# Patient Record
Sex: MALE | Race: WHITE | Employment: OTHER | URBAN - METROPOLITAN AREA
[De-identification: names, ages, dates, MRNs, and addresses within clinical notes are randomized per-mention and may not be internally consistent; named-entity substitution may affect disease eponyms.]

---

## 2018-04-02 ENCOUNTER — HOSPITAL ENCOUNTER (OUTPATIENT)
Dept: HYPERBARIC MEDICINE | Age: 68
Setting detail: THERAPIES SERIES
Discharge: HOME | End: 2018-04-02
Payer: COMMERCIAL

## 2018-04-02 VITALS
RESPIRATION RATE: 20 BRPM | SYSTOLIC BLOOD PRESSURE: 144 MMHG | DIASTOLIC BLOOD PRESSURE: 78 MMHG | HEART RATE: 90 BPM | TEMPERATURE: 97.9 F

## 2018-04-02 PROCEDURE — G0277 HBOT, FULL BODY CHAMBER, 30M: HCPCS

## 2018-04-02 RX ORDER — IBUPROFEN 600 MG/1
600 TABLET ORAL EVERY 4 HOURS PRN
COMMUNITY
End: 2018-04-30 | Stop reason: ALTCHOICE

## 2018-04-03 ENCOUNTER — HOSPITAL ENCOUNTER (OUTPATIENT)
Dept: HYPERBARIC MEDICINE | Age: 68
Setting detail: THERAPIES SERIES
Discharge: HOME | End: 2018-04-03
Payer: COMMERCIAL

## 2018-04-03 VITALS
TEMPERATURE: 97.8 F | HEART RATE: 76 BPM | RESPIRATION RATE: 18 BRPM | SYSTOLIC BLOOD PRESSURE: 143 MMHG | DIASTOLIC BLOOD PRESSURE: 88 MMHG

## 2018-04-03 PROCEDURE — G0277 HBOT, FULL BODY CHAMBER, 30M: HCPCS | Performed by: INTERNAL MEDICINE

## 2018-04-04 ENCOUNTER — HOSPITAL ENCOUNTER (OUTPATIENT)
Dept: HYPERBARIC MEDICINE | Age: 68
Setting detail: THERAPIES SERIES
Discharge: HOME | End: 2018-04-04
Payer: COMMERCIAL

## 2018-04-04 VITALS
SYSTOLIC BLOOD PRESSURE: 116 MMHG | TEMPERATURE: 98 F | DIASTOLIC BLOOD PRESSURE: 80 MMHG | RESPIRATION RATE: 20 BRPM | HEART RATE: 77 BPM

## 2018-04-04 PROCEDURE — G0277 HBOT, FULL BODY CHAMBER, 30M: HCPCS | Performed by: INTERNAL MEDICINE

## 2018-04-04 RX ORDER — CHLORHEXIDINE GLUCONATE 0.12 MG/ML
15 RINSE ORAL 2 TIMES DAILY
COMMUNITY
End: 2018-12-21

## 2018-04-05 ENCOUNTER — HOSPITAL ENCOUNTER (OUTPATIENT)
Dept: HYPERBARIC MEDICINE | Age: 68
Setting detail: THERAPIES SERIES
Discharge: HOME | End: 2018-04-05
Payer: COMMERCIAL

## 2018-04-05 VITALS
TEMPERATURE: 97.8 F | DIASTOLIC BLOOD PRESSURE: 87 MMHG | SYSTOLIC BLOOD PRESSURE: 164 MMHG | RESPIRATION RATE: 20 BRPM | HEART RATE: 71 BPM

## 2018-04-05 PROCEDURE — G0277 HBOT, FULL BODY CHAMBER, 30M: HCPCS | Performed by: INTERNAL MEDICINE

## 2018-04-06 ENCOUNTER — HOSPITAL ENCOUNTER (OUTPATIENT)
Dept: HYPERBARIC MEDICINE | Age: 68
Setting detail: THERAPIES SERIES
Discharge: HOME | End: 2018-04-06
Payer: COMMERCIAL

## 2018-04-06 VITALS
HEART RATE: 80 BPM | SYSTOLIC BLOOD PRESSURE: 132 MMHG | TEMPERATURE: 97.9 F | RESPIRATION RATE: 20 BRPM | DIASTOLIC BLOOD PRESSURE: 79 MMHG

## 2018-04-09 ENCOUNTER — HOSPITAL ENCOUNTER (OUTPATIENT)
Dept: HYPERBARIC MEDICINE | Age: 68
Setting detail: THERAPIES SERIES
Discharge: HOME | End: 2018-04-09
Payer: COMMERCIAL

## 2018-04-09 VITALS
HEART RATE: 66 BPM | SYSTOLIC BLOOD PRESSURE: 122 MMHG | TEMPERATURE: 97.8 F | DIASTOLIC BLOOD PRESSURE: 73 MMHG | RESPIRATION RATE: 20 BRPM

## 2018-04-09 PROCEDURE — G0277 HBOT, FULL BODY CHAMBER, 30M: HCPCS | Performed by: INTERNAL MEDICINE

## 2018-04-10 ENCOUNTER — HOSPITAL ENCOUNTER (OUTPATIENT)
Dept: HYPERBARIC MEDICINE | Age: 68
Setting detail: THERAPIES SERIES
Discharge: HOME | End: 2018-04-10
Payer: COMMERCIAL

## 2018-04-10 ENCOUNTER — APPOINTMENT (OUTPATIENT)
Dept: HYPERBARIC MEDICINE | Age: 68
End: 2018-04-10
Payer: COMMERCIAL

## 2018-04-10 VITALS
DIASTOLIC BLOOD PRESSURE: 78 MMHG | HEART RATE: 72 BPM | WEIGHT: 138 LBS | HEIGHT: 72 IN | RESPIRATION RATE: 20 BRPM | BODY MASS INDEX: 18.69 KG/M2 | TEMPERATURE: 97.7 F | SYSTOLIC BLOOD PRESSURE: 146 MMHG

## 2018-04-10 PROCEDURE — G0277 HBOT, FULL BODY CHAMBER, 30M: HCPCS | Performed by: INTERNAL MEDICINE

## 2018-04-10 RX ORDER — AMOXICILLIN 250 MG/1
250 CAPSULE ORAL 4 TIMES DAILY
COMMUNITY
End: 2018-04-12

## 2018-04-11 ENCOUNTER — HOSPITAL ENCOUNTER (OUTPATIENT)
Dept: HYPERBARIC MEDICINE | Age: 68
Setting detail: THERAPIES SERIES
Discharge: HOME | End: 2018-04-11
Payer: COMMERCIAL

## 2018-04-11 VITALS
SYSTOLIC BLOOD PRESSURE: 137 MMHG | DIASTOLIC BLOOD PRESSURE: 70 MMHG | RESPIRATION RATE: 18 BRPM | TEMPERATURE: 97.6 F | HEART RATE: 72 BPM

## 2018-04-11 PROCEDURE — G0277 HBOT, FULL BODY CHAMBER, 30M: HCPCS | Performed by: INTERNAL MEDICINE

## 2018-04-12 ENCOUNTER — HOSPITAL ENCOUNTER (OUTPATIENT)
Dept: HYPERBARIC MEDICINE | Age: 68
Setting detail: THERAPIES SERIES
Discharge: HOME | End: 2018-04-12
Payer: COMMERCIAL

## 2018-04-12 VITALS
TEMPERATURE: 97.8 F | SYSTOLIC BLOOD PRESSURE: 128 MMHG | HEART RATE: 79 BPM | RESPIRATION RATE: 20 BRPM | DIASTOLIC BLOOD PRESSURE: 67 MMHG

## 2018-04-12 PROCEDURE — G0277 HBOT, FULL BODY CHAMBER, 30M: HCPCS | Performed by: INTERNAL MEDICINE

## 2018-04-12 RX ORDER — PENICILLIN V POTASSIUM 250 MG/1
250 TABLET ORAL 4 TIMES DAILY
COMMUNITY
End: 2018-12-21

## 2018-04-13 ENCOUNTER — HOSPITAL ENCOUNTER (OUTPATIENT)
Dept: HYPERBARIC MEDICINE | Age: 68
Setting detail: THERAPIES SERIES
Discharge: HOME | End: 2018-04-13
Payer: COMMERCIAL

## 2018-04-13 VITALS
SYSTOLIC BLOOD PRESSURE: 110 MMHG | DIASTOLIC BLOOD PRESSURE: 69 MMHG | HEART RATE: 84 BPM | TEMPERATURE: 96.6 F | RESPIRATION RATE: 18 BRPM

## 2018-04-13 PROCEDURE — G0277 HBOT, FULL BODY CHAMBER, 30M: HCPCS | Performed by: INTERNAL MEDICINE

## 2018-04-18 ENCOUNTER — HOSPITAL ENCOUNTER (OUTPATIENT)
Dept: HYPERBARIC MEDICINE | Age: 68
Setting detail: THERAPIES SERIES
Discharge: HOME | End: 2018-04-18
Payer: COMMERCIAL

## 2018-04-18 VITALS
DIASTOLIC BLOOD PRESSURE: 65 MMHG | RESPIRATION RATE: 20 BRPM | TEMPERATURE: 96.3 F | SYSTOLIC BLOOD PRESSURE: 100 MMHG | HEART RATE: 82 BPM

## 2018-04-18 PROCEDURE — G0277 HBOT, FULL BODY CHAMBER, 30M: HCPCS | Performed by: INTERNAL MEDICINE

## 2018-04-18 RX ORDER — HYDROCODONE BITARTRATE AND ACETAMINOPHEN 5; 325 MG/1; MG/1
1 TABLET ORAL EVERY 6 HOURS PRN
COMMUNITY
End: 2018-04-19 | Stop reason: ALTCHOICE

## 2018-04-19 ENCOUNTER — HOSPITAL ENCOUNTER (OUTPATIENT)
Dept: HYPERBARIC MEDICINE | Age: 68
Setting detail: THERAPIES SERIES
Discharge: HOME | End: 2018-04-19
Payer: COMMERCIAL

## 2018-04-19 VITALS
DIASTOLIC BLOOD PRESSURE: 77 MMHG | TEMPERATURE: 97.4 F | RESPIRATION RATE: 20 BRPM | SYSTOLIC BLOOD PRESSURE: 132 MMHG | HEART RATE: 78 BPM

## 2018-04-19 PROCEDURE — G0277 HBOT, FULL BODY CHAMBER, 30M: HCPCS | Performed by: INTERNAL MEDICINE

## 2018-04-20 ENCOUNTER — HOSPITAL ENCOUNTER (OUTPATIENT)
Dept: HYPERBARIC MEDICINE | Age: 68
Setting detail: THERAPIES SERIES
Discharge: HOME | End: 2018-04-20
Payer: COMMERCIAL

## 2018-04-20 VITALS
HEART RATE: 81 BPM | DIASTOLIC BLOOD PRESSURE: 76 MMHG | TEMPERATURE: 97.3 F | SYSTOLIC BLOOD PRESSURE: 109 MMHG | RESPIRATION RATE: 20 BRPM

## 2018-04-20 PROCEDURE — G0277 HBOT, FULL BODY CHAMBER, 30M: HCPCS | Performed by: INTERNAL MEDICINE

## 2018-04-23 ENCOUNTER — HOSPITAL ENCOUNTER (OUTPATIENT)
Dept: HYPERBARIC MEDICINE | Age: 68
Setting detail: THERAPIES SERIES
Discharge: HOME | End: 2018-04-23
Payer: COMMERCIAL

## 2018-04-23 VITALS
HEART RATE: 70 BPM | TEMPERATURE: 97.3 F | SYSTOLIC BLOOD PRESSURE: 133 MMHG | DIASTOLIC BLOOD PRESSURE: 74 MMHG | RESPIRATION RATE: 18 BRPM

## 2018-04-23 PROCEDURE — G0277 HBOT, FULL BODY CHAMBER, 30M: HCPCS | Performed by: INTERNAL MEDICINE

## 2018-04-24 ENCOUNTER — APPOINTMENT (OUTPATIENT)
Dept: HYPERBARIC MEDICINE | Age: 68
End: 2018-04-24
Payer: COMMERCIAL

## 2018-04-25 ENCOUNTER — HOSPITAL ENCOUNTER (OUTPATIENT)
Dept: HYPERBARIC MEDICINE | Age: 68
Setting detail: THERAPIES SERIES
Discharge: HOME | End: 2018-04-25
Payer: COMMERCIAL

## 2018-04-25 VITALS
HEART RATE: 85 BPM | RESPIRATION RATE: 18 BRPM | HEIGHT: 71 IN | DIASTOLIC BLOOD PRESSURE: 68 MMHG | TEMPERATURE: 96 F | SYSTOLIC BLOOD PRESSURE: 121 MMHG

## 2018-04-25 PROCEDURE — G0277 HBOT, FULL BODY CHAMBER, 30M: HCPCS | Performed by: INTERNAL MEDICINE

## 2018-04-26 ENCOUNTER — HOSPITAL ENCOUNTER (OUTPATIENT)
Dept: HYPERBARIC MEDICINE | Age: 68
Setting detail: THERAPIES SERIES
Discharge: HOME | End: 2018-04-26
Payer: COMMERCIAL

## 2018-04-26 VITALS
SYSTOLIC BLOOD PRESSURE: 139 MMHG | TEMPERATURE: 97.9 F | RESPIRATION RATE: 20 BRPM | DIASTOLIC BLOOD PRESSURE: 81 MMHG | HEART RATE: 78 BPM

## 2018-04-26 PROCEDURE — G0277 HBOT, FULL BODY CHAMBER, 30M: HCPCS | Performed by: INTERNAL MEDICINE

## 2018-04-27 ENCOUNTER — HOSPITAL ENCOUNTER (OUTPATIENT)
Dept: HYPERBARIC MEDICINE | Age: 68
Setting detail: THERAPIES SERIES
Discharge: HOME | End: 2018-04-27
Payer: COMMERCIAL

## 2018-04-27 VITALS
TEMPERATURE: 97.6 F | SYSTOLIC BLOOD PRESSURE: 112 MMHG | HEART RATE: 76 BPM | RESPIRATION RATE: 20 BRPM | DIASTOLIC BLOOD PRESSURE: 76 MMHG

## 2018-04-27 PROCEDURE — G0277 HBOT, FULL BODY CHAMBER, 30M: HCPCS | Performed by: INTERNAL MEDICINE

## 2018-04-27 PROCEDURE — G0277 HBOT, FULL BODY CHAMBER, 30M: HCPCS

## 2018-04-30 ENCOUNTER — HOSPITAL ENCOUNTER (OUTPATIENT)
Dept: HYPERBARIC MEDICINE | Age: 68
Setting detail: THERAPIES SERIES
Discharge: HOME | End: 2018-04-30
Payer: COMMERCIAL

## 2018-04-30 PROCEDURE — G0277 HBOT, FULL BODY CHAMBER, 30M: HCPCS | Performed by: INTERNAL MEDICINE

## 2018-04-30 RX ORDER — NAPROXEN 250 MG/1
250 TABLET ORAL 2 TIMES DAILY WITH MEALS
COMMUNITY
End: 2018-12-21

## 2018-05-01 ENCOUNTER — HOSPITAL ENCOUNTER (OUTPATIENT)
Dept: HYPERBARIC MEDICINE | Age: 68
Setting detail: THERAPIES SERIES
Discharge: HOME | End: 2018-05-01
Payer: COMMERCIAL

## 2018-05-01 PROCEDURE — G0277 HBOT, FULL BODY CHAMBER, 30M: HCPCS | Performed by: INTERNAL MEDICINE

## 2018-05-02 ENCOUNTER — HOSPITAL ENCOUNTER (OUTPATIENT)
Dept: HYPERBARIC MEDICINE | Age: 68
Setting detail: THERAPIES SERIES
Discharge: HOME | End: 2018-05-02
Payer: COMMERCIAL

## 2018-05-02 VITALS
SYSTOLIC BLOOD PRESSURE: 133 MMHG | DIASTOLIC BLOOD PRESSURE: 74 MMHG | TEMPERATURE: 98 F | RESPIRATION RATE: 20 BRPM | HEART RATE: 76 BPM

## 2018-05-02 VITALS
HEIGHT: 71 IN | HEART RATE: 74 BPM | SYSTOLIC BLOOD PRESSURE: 111 MMHG | DIASTOLIC BLOOD PRESSURE: 59 MMHG | BODY MASS INDEX: 19.18 KG/M2 | RESPIRATION RATE: 20 BRPM | WEIGHT: 137 LBS | TEMPERATURE: 97.5 F

## 2018-05-02 PROCEDURE — G0277 HBOT, FULL BODY CHAMBER, 30M: HCPCS | Performed by: INTERNAL MEDICINE

## 2018-05-03 ENCOUNTER — APPOINTMENT (OUTPATIENT)
Dept: HYPERBARIC MEDICINE | Age: 68
End: 2018-05-03
Payer: COMMERCIAL

## 2018-05-04 ENCOUNTER — APPOINTMENT (OUTPATIENT)
Dept: HYPERBARIC MEDICINE | Age: 68
End: 2018-05-04
Payer: COMMERCIAL

## 2018-05-07 ENCOUNTER — APPOINTMENT (OUTPATIENT)
Dept: HYPERBARIC MEDICINE | Age: 68
End: 2018-05-07
Payer: COMMERCIAL

## 2018-05-08 ENCOUNTER — APPOINTMENT (OUTPATIENT)
Dept: HYPERBARIC MEDICINE | Age: 68
End: 2018-05-08
Payer: COMMERCIAL

## 2018-05-09 ENCOUNTER — APPOINTMENT (OUTPATIENT)
Dept: HYPERBARIC MEDICINE | Age: 68
End: 2018-05-09
Payer: COMMERCIAL

## 2018-05-10 ENCOUNTER — APPOINTMENT (OUTPATIENT)
Dept: HYPERBARIC MEDICINE | Age: 68
End: 2018-05-10
Payer: COMMERCIAL

## 2018-05-11 ENCOUNTER — APPOINTMENT (OUTPATIENT)
Dept: HYPERBARIC MEDICINE | Age: 68
End: 2018-05-11
Payer: COMMERCIAL

## 2018-12-21 ENCOUNTER — HOSPITAL ENCOUNTER (EMERGENCY)
Age: 68
Discharge: HOME | End: 2018-12-21
Payer: COMMERCIAL

## 2018-12-21 VITALS
HEIGHT: 72 IN | WEIGHT: 155 LBS | TEMPERATURE: 97.7 F | SYSTOLIC BLOOD PRESSURE: 134 MMHG | OXYGEN SATURATION: 96 % | DIASTOLIC BLOOD PRESSURE: 81 MMHG | HEART RATE: 88 BPM | BODY MASS INDEX: 20.99 KG/M2 | RESPIRATION RATE: 18 BRPM

## 2018-12-21 DIAGNOSIS — R51.9 PAIN OF CHEEK: Primary | ICD-10-CM

## 2018-12-21 PROCEDURE — 6370000000 HC RX 637 (ALT 250 FOR IP): Performed by: PHYSICIAN ASSISTANT

## 2018-12-21 PROCEDURE — 41800 DRAINAGE OF GUM LESION: CPT

## 2018-12-21 PROCEDURE — 99282 EMERGENCY DEPT VISIT SF MDM: CPT

## 2018-12-21 RX ORDER — CLINDAMYCIN HYDROCHLORIDE 300 MG/1
300 CAPSULE ORAL 3 TIMES DAILY
Qty: 30 CAPSULE | Refills: 0 | Status: SHIPPED | OUTPATIENT
Start: 2018-12-21 | End: 2018-12-31

## 2018-12-21 RX ORDER — NAPROXEN 500 MG/1
500 TABLET ORAL 2 TIMES DAILY
Qty: 14 TABLET | Refills: 0 | Status: SHIPPED | OUTPATIENT
Start: 2018-12-21 | End: 2018-12-28

## 2018-12-21 RX ORDER — OXYCODONE HYDROCHLORIDE AND ACETAMINOPHEN 5; 325 MG/1; MG/1
1 TABLET ORAL ONCE
Status: COMPLETED | OUTPATIENT
Start: 2018-12-21 | End: 2018-12-21

## 2018-12-21 RX ORDER — OXYCODONE HYDROCHLORIDE AND ACETAMINOPHEN 5; 325 MG/1; MG/1
1 TABLET ORAL EVERY 6 HOURS PRN
Qty: 20 TABLET | Refills: 0 | Status: SHIPPED | OUTPATIENT
Start: 2018-12-21 | End: 2018-12-26

## 2018-12-21 RX ORDER — CLINDAMYCIN HYDROCHLORIDE 150 MG/1
300 CAPSULE ORAL ONCE
Status: COMPLETED | OUTPATIENT
Start: 2018-12-21 | End: 2018-12-21

## 2018-12-21 RX ADMIN — CLINDAMYCIN HYDROCHLORIDE 300 MG: 150 CAPSULE ORAL at 13:52

## 2018-12-21 RX ADMIN — OXYCODONE AND ACETAMINOPHEN 1 TABLET: 5; 325 TABLET ORAL at 13:51

## 2018-12-21 ASSESSMENT — PAIN SCALES - GENERAL: PAINLEVEL_OUTOF10: 9

## 2018-12-21 NOTE — ED PROVIDER NOTES
Substance Abuse in his father. Allergies: Patient has no known allergies. Physical Exam           ED Triage Vitals [12/21/18 1301]   BP Temp Temp Source Pulse Resp SpO2 Height Weight   134/81 97.7 °F (36.5 °C) Oral 88 -- 96 % 6' (1.829 m) 155 lb (70.3 kg)      Oxygen Saturation Interpretation: Normal.    · Constitutional:  Alert, development consistent with age. · HEENT:  NC/NT. Airway patent. · Neck:  Supple. Normal ROM. · Lips:  upper and lower normal.  · Mouth:  normal tongue and buccal mucosa. · Dental:  Edentulous. Mild swelling of the right cheek and right lower gumline without drainable abscess. Trismus: mild. Drooling: No.           Airway stridor: No.  · Facial skin: right mild swelling with severe tenderness. · Respiratory:  Clear to auscultation and breath sounds equal.    · CV: Regular rate and rhythm, normal heart sounds, without pathological murmurs, ectopy, gallops, or rubs. · Skin:  No rashes, erythema or lesions present, unless noted elsewhere. · Lymphatics: No lymphangitis or adenopathy noted. · Neurological:  Oriented. Motor functions intact. Lab / Imaging Results   (All laboratory and radiology results have been personally reviewed by myself)  Labs:  No results found for this visit on 12/21/18. Imaging: All Radiology results interpreted by Radiologist unless otherwise noted. No orders to display     ED Course / Medical Decision Making     Medications   oxyCODONE-acetaminophen (PERCOCET) 5-325 MG per tablet 1 tablet (not administered)   clindamycin (CLEOCIN) capsule 300 mg (not administered)        Consult(s):   Dental Resident was not consulted to see patient regarding complaint. Procedure(s):    Dental block was performed by myself after being given verbal consent by the patient. 6 mL of bupivacaine with epinephrine was used and the patient had complete relief of his symptoms within 1 minute. .    Counseling/MDM:    The emergency provider has

## 2018-12-28 ENCOUNTER — HOSPITAL ENCOUNTER (OUTPATIENT)
Age: 68
Discharge: HOME | End: 2018-12-28
Payer: COMMERCIAL

## 2018-12-28 ENCOUNTER — HOSPITAL ENCOUNTER (OUTPATIENT)
Dept: CT IMAGING | Age: 68
Discharge: HOME | End: 2018-12-28
Payer: COMMERCIAL

## 2018-12-28 DIAGNOSIS — M84.48XA PATHOLOGICAL FRACTURE OF MANDIBLE, INITIAL ENCOUNTER: ICD-10-CM

## 2018-12-28 LAB
BUN BLDV-MCNC: 23 MG/DL (ref 8–23)
CREAT SERPL-MCNC: 0.9 MG/DL (ref 0.7–1.2)
GFR AFRICAN AMERICAN: >60
GFR NON-AFRICAN AMERICAN: >60 ML/MIN/1.73

## 2018-12-28 PROCEDURE — 82565 ASSAY OF CREATININE: CPT

## 2018-12-28 PROCEDURE — 70487 CT MAXILLOFACIAL W/DYE: CPT

## 2018-12-28 PROCEDURE — 6360000004 HC RX CONTRAST MEDICATION: Performed by: RADIOLOGY

## 2018-12-28 PROCEDURE — 36415 COLL VENOUS BLD VENIPUNCTURE: CPT

## 2018-12-28 PROCEDURE — 84520 ASSAY OF UREA NITROGEN: CPT

## 2018-12-28 RX ADMIN — IOPAMIDOL 80 ML: 755 INJECTION, SOLUTION INTRAVENOUS at 15:12

## 2019-06-19 ENCOUNTER — APPOINTMENT (OUTPATIENT)
Dept: CT IMAGING | Age: 69
DRG: 064 | End: 2019-06-19
Payer: MEDICARE

## 2019-06-19 ENCOUNTER — APPOINTMENT (OUTPATIENT)
Dept: GENERAL RADIOLOGY | Age: 69
End: 2019-06-19
Payer: MEDICARE

## 2019-06-19 ENCOUNTER — APPOINTMENT (OUTPATIENT)
Dept: CT IMAGING | Age: 69
End: 2019-06-19
Payer: MEDICARE

## 2019-06-19 ENCOUNTER — HOSPITAL ENCOUNTER (EMERGENCY)
Age: 69
Discharge: ANOTHER HOSPITAL | End: 2019-06-19
Attending: EMERGENCY MEDICINE
Payer: MEDICARE

## 2019-06-19 ENCOUNTER — APPOINTMENT (OUTPATIENT)
Dept: MRI IMAGING | Age: 69
DRG: 064 | End: 2019-06-19
Payer: MEDICARE

## 2019-06-19 ENCOUNTER — HOSPITAL ENCOUNTER (OUTPATIENT)
Age: 69
Discharge: HOME | End: 2019-06-19
Payer: MEDICARE

## 2019-06-19 ENCOUNTER — HOSPITAL ENCOUNTER (INPATIENT)
Age: 69
LOS: 9 days | Discharge: NURSING, REHAB OR LONG-TERM CARE | DRG: 064 | End: 2019-06-28
Attending: EMERGENCY MEDICINE | Admitting: HOSPITALIST
Payer: MEDICARE

## 2019-06-19 VITALS
SYSTOLIC BLOOD PRESSURE: 136 MMHG | RESPIRATION RATE: 18 BRPM | BODY MASS INDEX: 20.99 KG/M2 | HEART RATE: 80 BPM | TEMPERATURE: 96.3 F | HEIGHT: 72 IN | WEIGHT: 155 LBS | OXYGEN SATURATION: 96 % | DIASTOLIC BLOOD PRESSURE: 95 MMHG

## 2019-06-19 DIAGNOSIS — I63.519 CEREBROVASCULAR ACCIDENT (CVA) DUE TO OCCLUSION OF MIDDLE CEREBRAL ARTERY, UNSPECIFIED BLOOD VESSEL LATERALITY (HCC): Primary | ICD-10-CM

## 2019-06-19 DIAGNOSIS — I63.132 CEREBROVASCULAR ACCIDENT (CVA) DUE TO EMBOLISM OF LEFT CAROTID ARTERY (HCC): Primary | ICD-10-CM

## 2019-06-19 PROBLEM — I63.512 ACUTE ISCHEMIC CEREBROVASCULAR ACCIDENT (CVA) INVOLVING LEFT MIDDLE CEREBRAL ARTERY TERRITORY (HCC): Status: ACTIVE | Noted: 2019-06-19

## 2019-06-19 LAB
ANION GAP SERPL CALCULATED.3IONS-SCNC: 8 MMOL/L (ref 7–16)
APTT: 28.1 SEC (ref 24.5–35.1)
BACTERIA: ABNORMAL /HPF
BASOPHILS ABSOLUTE: 0.03 E9/L (ref 0–0.2)
BASOPHILS RELATIVE PERCENT: 0.4 % (ref 0–2)
BILIRUBIN URINE: NEGATIVE
BLOOD, URINE: NEGATIVE
BUN BLDV-MCNC: 17 MG/DL (ref 8–23)
CALCIUM SERPL-MCNC: 9 MG/DL (ref 8.6–10.2)
CHLORIDE BLD-SCNC: 107 MMOL/L (ref 98–107)
CHP ED QC CHECK: YES
CLARITY: CLEAR
CO2: 26 MMOL/L (ref 22–29)
CO2: 26 MMOL/L (ref 22–29)
COLOR: YELLOW
CREAT SERPL-MCNC: 0.7 MG/DL (ref 0.7–1.2)
EKG ATRIAL RATE: 69 BPM
EKG P AXIS: 74 DEGREES
EKG P-R INTERVAL: 148 MS
EKG Q-T INTERVAL: 434 MS
EKG QRS DURATION: 98 MS
EKG QTC CALCULATION (BAZETT): 465 MS
EKG R AXIS: 80 DEGREES
EKG T AXIS: 74 DEGREES
EKG VENTRICULAR RATE: 69 BPM
EOSINOPHILS ABSOLUTE: 0.23 E9/L (ref 0.05–0.5)
EOSINOPHILS RELATIVE PERCENT: 3.3 % (ref 0–6)
EPITHELIAL CELLS, UA: ABNORMAL /HPF
GFR AFRICAN AMERICAN: >60
GFR AFRICAN AMERICAN: >60
GFR NON-AFRICAN AMERICAN: >60 ML/MIN/1.73
GFR NON-AFRICAN AMERICAN: >60 ML/MIN/1.73
GLUCOSE BLD-MCNC: 106 MG/DL (ref 74–99)
GLUCOSE BLD-MCNC: 114 MG/DL
GLUCOSE BLD-MCNC: 116 MG/DL (ref 74–99)
GLUCOSE URINE: NEGATIVE MG/DL
HCT VFR BLD CALC: 36.6 % (ref 37–54)
HEMOGLOBIN: 12 G/DL (ref 12.5–16.5)
IMMATURE GRANULOCYTES #: 0.01 E9/L
IMMATURE GRANULOCYTES %: 0.1 % (ref 0–5)
INR BLD: 1
KETONES, URINE: NEGATIVE MG/DL
LEUKOCYTE ESTERASE, URINE: NEGATIVE
LYMPHOCYTES ABSOLUTE: 1.55 E9/L (ref 1.5–4)
LYMPHOCYTES RELATIVE PERCENT: 22 % (ref 20–42)
MCH RBC QN AUTO: 31.4 PG (ref 26–35)
MCHC RBC AUTO-ENTMCNC: 32.8 % (ref 32–34.5)
MCV RBC AUTO: 95.8 FL (ref 80–99.9)
METER GLUCOSE: 114 MG/DL (ref 74–99)
MONOCYTES ABSOLUTE: 0.79 E9/L (ref 0.1–0.95)
MONOCYTES RELATIVE PERCENT: 11.2 % (ref 2–12)
NEUTROPHILS ABSOLUTE: 4.43 E9/L (ref 1.8–7.3)
NEUTROPHILS RELATIVE PERCENT: 63 % (ref 43–80)
NITRITE, URINE: NEGATIVE
PDW BLD-RTO: 15.2 FL (ref 11.5–15)
PH UA: 5.5 (ref 5–9)
PLATELET # BLD: 247 E9/L (ref 130–450)
PMV BLD AUTO: 10.6 FL (ref 7–12)
POC ANION GAP: -3 MMOL/L (ref 7–16)
POC BUN: 26 MG/DL (ref 8–23)
POC CHLORIDE: 107 MMOL/L (ref 100–108)
POC CREATININE: 0.7 MG/DL (ref 0.7–1.2)
POC POTASSIUM: >9 MMOL/L (ref 3.5–5)
POC SODIUM: 130 MMOL/L (ref 132–146)
POTASSIUM SERPL-SCNC: 4.3 MMOL/L (ref 3.5–5)
PROTEIN UA: NEGATIVE MG/DL
PROTHROMBIN TIME: 11.6 SEC (ref 9.3–12.4)
RBC # BLD: 3.82 E12/L (ref 3.8–5.8)
RBC UA: ABNORMAL /HPF (ref 0–2)
SODIUM BLD-SCNC: 141 MMOL/L (ref 132–146)
SPECIFIC GRAVITY UA: 1.02 (ref 1–1.03)
TROPONIN: <0.01 NG/ML (ref 0–0.03)
UROBILINOGEN, URINE: 0.2 E.U./DL
WBC # BLD: 7 E9/L (ref 4.5–11.5)
WBC UA: ABNORMAL /HPF (ref 0–5)

## 2019-06-19 PROCEDURE — A0425 GROUND MILEAGE: HCPCS

## 2019-06-19 PROCEDURE — 6360000004 HC RX CONTRAST MEDICATION: Performed by: RADIOLOGY

## 2019-06-19 PROCEDURE — 81001 URINALYSIS AUTO W/SCOPE: CPT

## 2019-06-19 PROCEDURE — 0042T CT BRAIN PERFUSION: CPT

## 2019-06-19 PROCEDURE — 70551 MRI BRAIN STEM W/O DYE: CPT

## 2019-06-19 PROCEDURE — 93010 ELECTROCARDIOGRAM REPORT: CPT | Performed by: INTERNAL MEDICINE

## 2019-06-19 PROCEDURE — 99285 EMERGENCY DEPT VISIT HI MDM: CPT

## 2019-06-19 PROCEDURE — 2580000003 HC RX 258: Performed by: HOSPITALIST

## 2019-06-19 PROCEDURE — 99291 CRITICAL CARE FIRST HOUR: CPT | Performed by: NURSE PRACTITIONER

## 2019-06-19 PROCEDURE — 84520 ASSAY OF UREA NITROGEN: CPT

## 2019-06-19 PROCEDURE — A0426 ALS 1: HCPCS

## 2019-06-19 PROCEDURE — 84484 ASSAY OF TROPONIN QUANT: CPT

## 2019-06-19 PROCEDURE — 80048 BASIC METABOLIC PNL TOTAL CA: CPT

## 2019-06-19 PROCEDURE — 36415 COLL VENOUS BLD VENIPUNCTURE: CPT

## 2019-06-19 PROCEDURE — 70496 CT ANGIOGRAPHY HEAD: CPT

## 2019-06-19 PROCEDURE — 71045 X-RAY EXAM CHEST 1 VIEW: CPT

## 2019-06-19 PROCEDURE — 80051 ELECTROLYTE PANEL: CPT

## 2019-06-19 PROCEDURE — 82947 ASSAY GLUCOSE BLOOD QUANT: CPT

## 2019-06-19 PROCEDURE — 2060000000 HC ICU INTERMEDIATE R&B

## 2019-06-19 PROCEDURE — 82565 ASSAY OF CREATININE: CPT

## 2019-06-19 PROCEDURE — 82962 GLUCOSE BLOOD TEST: CPT

## 2019-06-19 PROCEDURE — 85610 PROTHROMBIN TIME: CPT

## 2019-06-19 PROCEDURE — 85025 COMPLETE CBC W/AUTO DIFF WBC: CPT

## 2019-06-19 PROCEDURE — 70498 CT ANGIOGRAPHY NECK: CPT

## 2019-06-19 PROCEDURE — 85730 THROMBOPLASTIN TIME PARTIAL: CPT

## 2019-06-19 PROCEDURE — 93005 ELECTROCARDIOGRAM TRACING: CPT | Performed by: STUDENT IN AN ORGANIZED HEALTH CARE EDUCATION/TRAINING PROGRAM

## 2019-06-19 PROCEDURE — 2580000003 HC RX 258: Performed by: RADIOLOGY

## 2019-06-19 RX ORDER — SODIUM CHLORIDE 0.9 % (FLUSH) 0.9 %
10 SYRINGE (ML) INJECTION ONCE
Status: COMPLETED | OUTPATIENT
Start: 2019-06-19 | End: 2019-06-19

## 2019-06-19 RX ORDER — ATORVASTATIN CALCIUM 40 MG/1
40 TABLET, FILM COATED ORAL NIGHTLY
Status: DISCONTINUED | OUTPATIENT
Start: 2019-06-19 | End: 2019-06-28 | Stop reason: HOSPADM

## 2019-06-19 RX ORDER — ONDANSETRON 2 MG/ML
4 INJECTION INTRAMUSCULAR; INTRAVENOUS EVERY 6 HOURS PRN
Status: DISCONTINUED | OUTPATIENT
Start: 2019-06-19 | End: 2019-06-28 | Stop reason: HOSPADM

## 2019-06-19 RX ORDER — SODIUM CHLORIDE 0.9 % (FLUSH) 0.9 %
10 SYRINGE (ML) INJECTION EVERY 12 HOURS SCHEDULED
Status: DISCONTINUED | OUTPATIENT
Start: 2019-06-19 | End: 2019-06-28 | Stop reason: HOSPADM

## 2019-06-19 RX ORDER — PREDNISOLONE ACETATE 10 MG/ML
1 SUSPENSION/ DROPS OPHTHALMIC 4 TIMES DAILY
Status: ON HOLD | COMMUNITY
End: 2019-06-28 | Stop reason: HOSPADM

## 2019-06-19 RX ORDER — PREDNISOLONE ACETATE 10 MG/ML
1 SUSPENSION/ DROPS OPHTHALMIC EVERY 6 HOURS SCHEDULED
Status: DISCONTINUED | OUTPATIENT
Start: 2019-06-19 | End: 2019-06-26

## 2019-06-19 RX ORDER — SODIUM CHLORIDE 0.9 % (FLUSH) 0.9 %
10 SYRINGE (ML) INJECTION PRN
Status: DISCONTINUED | OUTPATIENT
Start: 2019-06-19 | End: 2019-06-28 | Stop reason: HOSPADM

## 2019-06-19 RX ORDER — DEXTROSE AND SODIUM CHLORIDE 5; .9 G/100ML; G/100ML
INJECTION, SOLUTION INTRAVENOUS CONTINUOUS
Status: DISCONTINUED | OUTPATIENT
Start: 2019-06-19 | End: 2019-06-24

## 2019-06-19 RX ORDER — ASPIRIN 81 MG/1
81 TABLET, CHEWABLE ORAL DAILY
Status: DISCONTINUED | OUTPATIENT
Start: 2019-06-19 | End: 2019-06-28 | Stop reason: HOSPADM

## 2019-06-19 RX ADMIN — Medication 10 ML: at 20:59

## 2019-06-19 RX ADMIN — IOPAMIDOL 80 ML: 755 INJECTION, SOLUTION INTRAVENOUS at 06:15

## 2019-06-19 RX ADMIN — DEXTROSE AND SODIUM CHLORIDE: 5; 900 INJECTION, SOLUTION INTRAVENOUS at 14:18

## 2019-06-19 RX ADMIN — Medication 10 ML: at 09:20

## 2019-06-19 RX ADMIN — Medication 10 ML: at 14:21

## 2019-06-19 RX ADMIN — PREDNISOLONE ACETATE 1 DROP: 10 SUSPENSION/ DROPS OPHTHALMIC at 21:11

## 2019-06-19 RX ADMIN — IOPAMIDOL 40 ML: 755 INJECTION, SOLUTION INTRAVENOUS at 09:20

## 2019-06-19 ASSESSMENT — PAIN SCALES - GENERAL
PAINLEVEL_OUTOF10: 0
PAINLEVEL_OUTOF10: 0

## 2019-06-19 ASSESSMENT — PAIN SCALES - WONG BAKER: WONGBAKER_NUMERICALRESPONSE: 0

## 2019-06-19 NOTE — CONSULTS
BILITOT 0.4 04/06/2017    ALKPHOS 68 04/06/2017    AST 14 04/06/2017    ALT 19 04/06/2017     CTA neck:  1. Soft plaque seen within the proximal left internal carotid artery  causing circumferential narrowing of the proximal left internal  carotid artery. The lumen is narrowed by approximately 75%. The short  segmental stenosis extends over a segment of approximately 1.2 cm. 2. There is no hemodynamically significant stenosis of the right  internal carotid artery. The right common carotid artery is widely  patent. 3. The left common carotid artery is patent. 4. Patent codominant vertebral arteries. CTA head:  1. Occluding thrombus within the left cavernous segment of the  internal carotid artery which extends into the left middle cerebral  artery. The left M1, M2 and M3 segments are completely thrombosed. Collateral vessels reconstitute the cortical branches of the left  middle cerebral artery. 2. There is no appreciable thrombus seen within the right intracranial  segment of the internal carotid artery. 3. The right and left anterior cerebral arteries, right middle  cerebral artery and posterior cerebral arteries are widely patent. CT brain perfusion:  There is significant core infarct identified, on quantitative software  this is greater than 30 mL volume    I independently reviewed the labs and imaging studies at today's appointment. Assessment:     Stroke alert   No tPA administered LKW >4 hours   Not IR candidate significant core infarct identified   LICA occluded thrombus likely cause of infarct to LMCA     Spoke with Dr. Annika Velásquez and advised of CTA results and discussed pt not tPA candidate due to LKW.     Plan:     Admit and stroke protocol  MRI brain  LDL and HA1C  Start Aspirin 81 mg   Continue Lipitor 40 mg   PT/OT eval  Stroke education  Neurology to follow       HAI Guevara-CNP  12:22 PM  6/19/2019     I spent 45 minutes of CC time facilitating the above Team S protocol upon the

## 2019-06-19 NOTE — H&P
Hospital Medicine History & Physical      PCP: Nati Blandon DO    Date of Admission: 6/19/2019    Date of Service: 6-19-19    Chief Complaint:    Stroke transfer from OSH    History Of Present Illness:      81 yo male hx copd throat cancer transfer from OSH for new cva, pt seen at [facility] for cva, for AMS , nonverbal upon waking up at 1230 am on 6-19-19 ,with rt sided weakness ataxia aphasia , rt sided facial droop and language changes , hx oropharyngeal  cancer  S/p chemo/xrt/surgery 8 yrs ago cancer free since, s/p jaw surgery  Recent in 1-2019 and has copd  NIH score 14 at OSH , imagings showed  acute thrombus/occlusion of the left cavernous ICA which extends into the left MCA. The left M1, M2, M3, left ICA is completely thrombosed. ekg NSR  And ct perfusion showed significant core infarct identified, on quantitative software this is greater than 30 mL volume, IR intervention not amenable  and being admitted to ICU  Per wife he takes no medicaitons , smokes 1 ppd      Past Medical History:          Diagnosis Date    Cancer ([facility] [address] 6/25/2012    throat    COPD (chronic obstructive pulmonary disease) ([facility] [address]        Past Surgical History:          Procedure Laterality Date    DENTAL SURGERY Bilateral 03/26/2018    FRACTURE SURGERY      right thumb    MOUTH BIOPSY  06/25/2012    right lateral pharynx biopsy. Medications Prior to Admission:      Prior to Admission medications    Medication Sig Start Date End Date Taking? Authorizing Provider   prednisoLONE acetate (PRED FORTE) 1 % ophthalmic suspension Place 1 drop into the right eye 4 times daily   Yes Historical Provider, MD       Allergies:  Patient has no known allergies. Social History:      The patient currently lives AT HOME    TOBACCO:   reports that he quit smoking about 9 years ago. He has a 60.00 pack-year smoking history. He has never used smokeless tobacco.  ETOH:   reports that he does not drink alcohol.       Family History:      Reviewed HGB 12.0*   HCT 36.6*        Recent Labs     06/19/19  0510 06/19/19  0520     --    K 4.3  --      --    CO2 26 26   BUN 17  --    CREATININE 0.7 0.7   CALCIUM 9.0  --      No results for input(s): AST, ALT, BILIDIR, BILITOT, ALKPHOS in the last 72 hours. Recent Labs     06/19/19  0510   INR 1.0     Recent Labs     06/19/19  0510   TROPONINI <0.01       Urinalysis:      Lab Results   Component Value Date    NITRU Negative 06/19/2019    WBCUA 2-5 06/19/2019    BACTERIA RARE 06/19/2019    RBCUA 0-1 06/19/2019    BLOODU Negative 06/19/2019    SPECGRAV 1.020 06/19/2019    GLUCOSEU Negative 06/19/2019       Radiology:       CT BRAIN PERFUSION   Final Result      There is significant core infarct identified, on quantitative software   this is greater than 30 mL volume                ASSESSMENT:    Active Hospital Problems    Diagnosis Date Noted    Acute ischemic cerebrovascular accident (CVA) involving left middle cerebral artery territory [facility]) [I63.512] 06/19/2019       79 yo male hx copd throat cancer transfer from OSH for new cva, pt seen at [facility] for cva, for AMS , nonverbal upon waking up at 1230 am on 6-19-19 ,with rt sided weakness ataxia aphasia , rt sided facial droop and language changes , hx oropharyngeal  cancer  S/p chemo/xrt/surgery 8 yrs ago cancer free since, s/p jaw surgery  Recent in 1-2019 and has copd  NIH score 14 at OSH , imagings showed  acute thrombus/occlusion of the left cavernous ICA which extends into the left MCA. The left M1, M2, M3, left ICA is completely thrombosed.  ekg NSR  And ct perfusion showed significant core infarct identified, on quantitative software this is greater than 30 mL volume, IR intervention not amenable  and being admitted to ICU  Per wife he takes no medicaitons , smokes 1 ppd      ACUTE CVA-- LEFT ICA/LEFT MCA territory  Smoker  Hx remote ENT cancer , cancer free last 8 yrs per wife  Copd    ADMIT to nicu  Neuro

## 2019-06-19 NOTE — ED NOTES
Family updated on plan of care, pt resting comfortably, no distress at this time.      Debbi Marrero RN  06/19/19 3414

## 2019-06-19 NOTE — ED NOTES
Patient resting in bed, unable to move right arm, some movement to right leg, speech unintelligible, but follows commands, family remains at bedside. Awaiting orders.          Colin Cadet RN  06/19/19 3653

## 2019-06-19 NOTE — ED PROVIDER NOTES
Department of Emergency Medicine   ED  Provider Note  Admit Date/RoomTime: 6/19/2019  4:57 AM  ED Room: 09/09 6/19/19  5:01 AM      HISTORY OF PRESENT ILLNESS:  (Nurses Notes Reviewed)    Chief Complaint:   Altered Mental Status (Pt's wife awoke to pt trying to get out of bed but not being able to and he kept falling back into bed. Pt is nonverbal and is unable to follow directions. Pt's baseline is a normal, healthy 80year old male)      Source of history provided by:  EMS personnel. History/Exam Limitations: communication barrier Language. Noemi Gonzalez is a 80 y.o. old male presenting to the emergency department by EMS, with sudden onset of right sided weakness, language changes, and ataxia, which began upon waking up. Last known well time: 1230am.  The episode occurred at home. Since recognized the symptoms have been persistent. He has no neurologic history. He has stroke risk factors of: none. There has been no history of recent trauma. Patient awoke at approximately 4:30 AM with inability to stand and falling towards his right side. He never fell and hit his head. Patient's last known well was at 12:30 AM when he went to bed. EMS personnel was called. They noted right-sided weakness as well as a right-sided facial droop and language changes. Point-of-care blood glucose while in route was 130. No further interventions were performed. Patient's past medical history significant for esophageal cancer, status post jaw surgery, COPD. Code Status on file: Full Code. NIH Stroke Scale at time of initial evaluation:   NIH/MNHISS Stroke Scale  Interval: Baseline  Level of Consciousness (1a. ): Alert  LOC Questions (1b. ):  Incorrect  LOC Commands (1c. ): Obeys both correctly  Best Gaze (2. ): Normal  Visual (3. ): No visual loss  Facial Palsy (4. ): (!) Partial  Motor Arm, Left (5a. ): No drift  Motor Arm, Right (5b. ): No movement  Motor Leg, Left (6a. ): No drift  Motor Leg, Right (6b. ): No effort against gravity  Limb Ataxia (7. ): (!) Present in two limbs  Sensory (8. ): (!) Partial loss  Best Language (9. ): Severe aphasia  Dysarthria (10. ): Near to unintelligible  Extinction and Inattention (11): No neglect  Total: 18     NIH Stroke Scale/Score at time of initial evaluation:  1A: Level of Consciousness 0 - alert; keenly responsive   1B: Ask Month and Age 0 - answers both questions correctly   1C: Tell Patient To Open and Close Eyes, then Hand  Squeeze 0 - performs both tasks correctly   2: Test Horizontal Extraocular Movements 0 - normal   3: Test Visual Fields 0 - no visual loss   4: Test Facial Palsy 2 - partial paralysis (total or near total paralysis of the lower face)   5A: Test Left Arm Motor Drift 0 - no drift, limb holds 90 (or 45) degrees for full 10 seconds   5B: Test Right Arm Motor Drift 4 - no movement   6A: Test Left Leg Motor Drift 0 - no drift; leg holds 30 degree position for full 5 seconds   6B: Test Right Leg Motor Drift 4 - no movement   7: Test Limb Ataxia   (FNF/Heel-Shin) 0 - absent   8: Test Sensation 0 - normal; no sensory loss   9: Test Language/Aphasia 2 - severe aphasia; all communication is through fragmentary expression; great need for inference, questioning, and guessing by the listener. Range of information that can be exchanged is limited; listener carries burden of communication. Examiner cannot identify materials provided from patient response. 10: Test Dysarthria 2 - severe; patient speech is so slurred as to be unintelligible in the absence of or our of proportion to any dysphagia, or is mute/anarthric    11: Test Extinction/Inattention 0 - no abnormality   Total Score: 14       Past Medical History:  has a past medical history of Cancer ([address] and COPD (chronic obstructive pulmonary disease) ([facility] [address]     Past Surgical History:  has a past surgical history that includes fracture surgery; Mouth Biopsy (06/25/2012); and Dental surgery (Bilateral, Moves all extremities x 4. Warm and well perfused, no clubbing, cyanosis, or edema. Capillary refill <3 seconds  Skin: warm and dry. No rashes. Neurologic: Alert. Follows commands. Right-sided facial droop. Dysarthria. Flaccid paralysis of the right upper and right lower extremities. Normal strength without pronator drift of the left upper and left lower extremities.       -------------------------------------------------- RESULTS -------------------------------------------------  All laboratory and imaging studies have been reviewed by myself    LABS:  Results for orders placed or performed during the hospital encounter of 06/19/19   Urinalysis with Microscopic   Result Value Ref Range    Color, UA Yellow Straw/Yellow    Clarity, UA Clear Clear    Glucose, Ur Negative Negative mg/dL    Bilirubin Urine Negative Negative    Ketones, Urine Negative Negative mg/dL    Specific Gravity, UA 1.020 1.005 - 1.030    Blood, Urine Negative Negative    pH, UA 5.5 5.0 - 9.0    Protein, UA Negative Negative mg/dL    Urobilinogen, Urine 0.2 <2.0 E.U./dL    Nitrite, Urine Negative Negative    Leukocyte Esterase, Urine Negative Negative    WBC, UA 2-5 0 - 5 /HPF    RBC, UA 0-1 0 - 2 /HPF    Epi Cells RARE /HPF    Bacteria, UA RARE (A) /HPF   CBC auto differential   Result Value Ref Range    WBC 7.0 4.5 - 11.5 E9/L    RBC 3.82 3.80 - 5.80 E12/L    Hemoglobin 12.0 (L) 12.5 - 16.5 g/dL    Hematocrit 36.6 (L) 37.0 - 54.0 %    MCV 95.8 80.0 - 99.9 fL    MCH 31.4 26.0 - 35.0 pg    MCHC 32.8 32.0 - 34.5 %    RDW 15.2 (H) 11.5 - 15.0 fL    Platelets 881 815 - 905 E9/L    MPV 10.6 7.0 - 12.0 fL    Neutrophils % 63.0 43.0 - 80.0 %    Immature Granulocytes % 0.1 0.0 - 5.0 %    Lymphocytes % 22.0 20.0 - 42.0 %    Monocytes % 11.2 2.0 - 12.0 %    Eosinophils % 3.3 0.0 - 6.0 %    Basophils % 0.4 0.0 - 2.0 %    Neutrophils # 4.43 1.80 - 7.30 E9/L    Immature Granulocytes # 0.01 E9/L    Lymphocytes # 1.55 1.50 - 4.00 E9/L    Monocytes # 0.79 encounters were reviewed. ------------------------------ ED COURSE/MEDICAL DECISION MAKING----------------------  Medications   iopamidol (ISOVUE-370) 76 % injection 80 mL (80 mLs Intravenous Given 6/19/19 0615)       Based upon patient's stroke like symptoms a stroke neurology consult is indicated. Consult to Neurology by receiving facility, it was critical to get the patient to outlying facility where he could have CT brain perfusion. I did speak to endovascular regarding the patient's CTA results. They recommended this approach. Acute CVA Core Measures:   Last Known to be Well (Stroke Diagnosis)  Date Last Known Well: 06/19/19  Time Last Known Well: 0030  NIH Stroke Scale Total: Total: 18  t-PA Eligibility: Patient was not TPA eligible secondary to greater than 3 hours from last known well. No  indication for TPA. Medical Decision Making:    Patient is a 23-year-old male presenting to the emergency department with a chief complaint of right-sided weakness as well as altered mental status. Patient found to have acute stroke. Patient with onset greater than 3 hours from arrival to the emergency department. The patient did have labs and imaging which were reviewed. The patient was not a TPA candidate. The patient was found to have ICA occlusion. Endovascular was immediately consulted. They state that the patient does need to be transferred to facility with CT brain perfusion capabilities to determine if he is eligible for thrombectomy. I did speak to the ED physician at [facility] who did accept the patient. I did also speak to internal medicine physician to have the patient admitted at [facility].    Re-Evaluations:             Re-evaluation.   Patients symptoms show no change    This patient's ED course included: a personal history and physicial examination, re-evaluation prior to disposition, multiple bedside re-evaluations, IV medications, cardiac monitoring, continuous pulse oximetry, complex medical decision making and emergency management and a personal history and physicial eaxmination    This patient has remained hemodynamically stable during their ED course. Consultations:  Time: 0726: Spoke to Dr. Con Nguyen he will accept the patient for admission  Time: 0726: Spoke to Dr. Rudolph Rodriguez at [facility] ED he will accept the patient pending IR Consultation. Critical Care:45 minutes        Counseling: The emergency provider has spoken with the patient and discussed todays presentation, in addition to providing specific details for the plan of care and counseling regarding the diagnosis and prognosis. Questions are answered at this time and they are agreeable with the plan. Was IV tPa Delay/Not Given: TPA not given secondary to the patient being out of the time window for TPA. IV tPA not given because:  Patient out of time frame     --------------------------------- IMPRESSION AND DISPOSITION ---------------------------------    IMPRESSION  1. Cerebrovascular accident (CVA) due to embolism of left carotid artery [facility])        DISPOSITION  Disposition: Transfer to [facility] ED  Patient condition is serious                Mary Jo Block DO  06/22/19 0016    ATTENDING PROVIDER ATTESTATION:     Adriana Francis presented to the emergency department for evaluation of Altered Mental Status (Pt's wife awoke to pt trying to get out of bed but not being able to and he kept falling back into bed. Pt is nonverbal and is unable to follow directions. Pt's baseline is a normal, healthy 80year old male)    I have reviewed and discussed the case, including pertinent history (medical, surgical, family and social) and exam findings with the Resident and the Nurse assigned to Adriana Francis.   I have personally performed and/or participated in the history, exam, medical decision making, and procedures and agree with all pertinent clinical

## 2019-06-20 ENCOUNTER — APPOINTMENT (OUTPATIENT)
Dept: GENERAL RADIOLOGY | Age: 69
DRG: 064 | End: 2019-06-20
Payer: MEDICARE

## 2019-06-20 LAB
ANION GAP SERPL CALCULATED.3IONS-SCNC: 10 MMOL/L (ref 7–16)
BUN BLDV-MCNC: 12 MG/DL (ref 8–23)
CALCIUM SERPL-MCNC: 8.7 MG/DL (ref 8.6–10.2)
CHLORIDE BLD-SCNC: 109 MMOL/L (ref 98–107)
CHOLESTEROL, TOTAL: 146 MG/DL (ref 0–199)
CO2: 23 MMOL/L (ref 22–29)
CREAT SERPL-MCNC: 0.6 MG/DL (ref 0.7–1.2)
GFR AFRICAN AMERICAN: >60
GFR NON-AFRICAN AMERICAN: >60 ML/MIN/1.73
GLUCOSE BLD-MCNC: 138 MG/DL (ref 74–99)
HBA1C MFR BLD: 5.9 % (ref 4–5.6)
HCT VFR BLD CALC: 36.4 % (ref 37–54)
HDLC SERPL-MCNC: 39 MG/DL
HEMOGLOBIN: 12 G/DL (ref 12.5–16.5)
LDL CHOLESTEROL CALCULATED: 94 MG/DL (ref 0–99)
MAGNESIUM: 2.1 MG/DL (ref 1.6–2.6)
MCH RBC QN AUTO: 31.5 PG (ref 26–35)
MCHC RBC AUTO-ENTMCNC: 33 % (ref 32–34.5)
MCV RBC AUTO: 95.5 FL (ref 80–99.9)
PDW BLD-RTO: 14.8 FL (ref 11.5–15)
PHOSPHORUS: 1.9 MG/DL (ref 2.5–4.5)
PLATELET # BLD: 250 E9/L (ref 130–450)
PMV BLD AUTO: 10.7 FL (ref 7–12)
POTASSIUM SERPL-SCNC: 3.8 MMOL/L (ref 3.5–5)
RBC # BLD: 3.81 E12/L (ref 3.8–5.8)
SODIUM BLD-SCNC: 142 MMOL/L (ref 132–146)
TRIGL SERPL-MCNC: 66 MG/DL (ref 0–149)
VLDLC SERPL CALC-MCNC: 13 MG/DL
WBC # BLD: 9.1 E9/L (ref 4.5–11.5)

## 2019-06-20 PROCEDURE — 85027 COMPLETE CBC AUTOMATED: CPT

## 2019-06-20 PROCEDURE — 97166 OT EVAL MOD COMPLEX 45 MIN: CPT

## 2019-06-20 PROCEDURE — 97530 THERAPEUTIC ACTIVITIES: CPT

## 2019-06-20 PROCEDURE — 99221 1ST HOSP IP/OBS SF/LOW 40: CPT | Performed by: SURGERY

## 2019-06-20 PROCEDURE — 2060000000 HC ICU INTERMEDIATE R&B

## 2019-06-20 PROCEDURE — 36415 COLL VENOUS BLD VENIPUNCTURE: CPT

## 2019-06-20 PROCEDURE — 97162 PT EVAL MOD COMPLEX 30 MIN: CPT

## 2019-06-20 PROCEDURE — 74230 X-RAY XM SWLNG FUNCJ C+: CPT

## 2019-06-20 PROCEDURE — 80061 LIPID PANEL: CPT

## 2019-06-20 PROCEDURE — 2500000003 HC RX 250 WO HCPCS: Performed by: NURSE PRACTITIONER

## 2019-06-20 PROCEDURE — 84100 ASSAY OF PHOSPHORUS: CPT

## 2019-06-20 PROCEDURE — 83735 ASSAY OF MAGNESIUM: CPT

## 2019-06-20 PROCEDURE — 92611 MOTION FLUOROSCOPY/SWALLOW: CPT

## 2019-06-20 PROCEDURE — 6370000000 HC RX 637 (ALT 250 FOR IP): Performed by: PSYCHIATRY & NEUROLOGY

## 2019-06-20 PROCEDURE — 99233 SBSQ HOSP IP/OBS HIGH 50: CPT | Performed by: PSYCHIATRY & NEUROLOGY

## 2019-06-20 PROCEDURE — 83036 HEMOGLOBIN GLYCOSYLATED A1C: CPT

## 2019-06-20 PROCEDURE — 80048 BASIC METABOLIC PNL TOTAL CA: CPT

## 2019-06-20 PROCEDURE — 92523 SPEECH SOUND LANG COMPREHEN: CPT

## 2019-06-20 PROCEDURE — 2580000003 HC RX 258: Performed by: HOSPITALIST

## 2019-06-20 RX ORDER — ASPIRIN 600 MG/1
300 SUPPOSITORY RECTAL DAILY
Status: DISCONTINUED | OUTPATIENT
Start: 2019-06-20 | End: 2019-06-22

## 2019-06-20 RX ADMIN — BARIUM SULFATE 45 G: 0.6 CREAM ORAL at 14:35

## 2019-06-20 RX ADMIN — ASPIRIN 300 MG: 600 SUPPOSITORY RECTAL at 18:12

## 2019-06-20 RX ADMIN — PREDNISOLONE ACETATE 1 DROP: 10 SUSPENSION/ DROPS OPHTHALMIC at 18:12

## 2019-06-20 RX ADMIN — PREDNISOLONE ACETATE 1 DROP: 10 SUSPENSION/ DROPS OPHTHALMIC at 23:42

## 2019-06-20 RX ADMIN — PREDNISOLONE ACETATE 1 DROP: 10 SUSPENSION/ DROPS OPHTHALMIC at 05:51

## 2019-06-20 RX ADMIN — PREDNISOLONE ACETATE 1 DROP: 10 SUSPENSION/ DROPS OPHTHALMIC at 00:19

## 2019-06-20 RX ADMIN — BARIUM SULFATE 58 G: 960 POWDER, FOR SUSPENSION ORAL at 14:35

## 2019-06-20 RX ADMIN — Medication 10 ML: at 20:28

## 2019-06-20 RX ADMIN — PREDNISOLONE ACETATE 1 DROP: 10 SUSPENSION/ DROPS OPHTHALMIC at 12:26

## 2019-06-20 ASSESSMENT — PAIN SCALES - GENERAL
PAINLEVEL_OUTOF10: 0

## 2019-06-20 ASSESSMENT — PAIN SCALES - WONG BAKER
WONGBAKER_NUMERICALRESPONSE: 0

## 2019-06-20 NOTE — PROGRESS NOTES
left M1, M2, M3, left ICA is completely thrombosed. ekg NSR  And ct perfusion showed significant core infarct identified, on quantitative software this is greater than 30 mL volume, IR intervention not amenable  and being admitted to ICU  Per wife he takes no medicaitons , smokes 1 ppd        ACUTE CVA-- LEFT ICA/LEFT MCA territory  Smoker  Hx remote ENT cancer , cancer free last 8 yrs per wife  Copd     Plan:    Asa 81 qd, lipitor 40 mg daily, LDL 94  PT/OT/SPEECH  MRI brain reviewed  Echo       DVT Prophylaxis: scd  Diet: Diet NPO Effective Now Exceptions are:  Other (See Comment)  Code Status: Full Code    PT/OT Eval Status: ordered    Dispo - ip    Gill Briones MD

## 2019-06-20 NOTE — PROGRESS NOTES
SPEECH/LANGUAGE PATHOLOGY  SPEECH/LANGUAGE/COGNITIVE EVALUATION      PATIENT NAME:  Candelaria Dennis      :  1950      TODAY'S DATE:  2019      SPEECH PATHOLOGY DIAGNOSIS:    Marked Expressive and Receptive Aphasia with suspected dysarthria     THERAPY RECOMMENDATIONS:   Speech Pathology intervention is recommended 3-5 times per week for LOS or when goals are met with emphasis on the following: To improve comprehension of yes/no questions, directions, and conversation  To increase verbal output                MOTOR SPEECH       Oral Peripheral Examination   Generalized oral weakness    Parameters of Speech Production  Respiration:  Could not test  Articulation:  Could not test  Resonance:  Could not test  Quality:   Could not test  Pitch:    Could not test  Intensity: Could not test  Fluency:  Could not test  Prosody Could not test     RECEPTIVE LANGUAGE    Comprehension of Yes/No Questions:   Unable    Process  Simple Verbal Commands:   Latent  Process Intermediate Verbal Commands:   Incomplete  Process Complex Verbal Commands:     Could not test    Comprehension of Conversation:      Latent and Inconsistent      EXPRESSIVE LANGUAGE     Serials: Impaired    Imitation:  Words   Impaired     Conversation:      Limited expressive language    COGNITION     Attention/Orientation  Attention: Sustained attention   Orientation:  Oriented to Person    CLINICAL OBSERVATIONS NOTED DURING THE EVALUATION  Latent responses and Inconsistent responses                  Prognosis for improvements is good  This plan will be re-evaluated and revised in 1 week  if warranted. Treatment goals discussed with Patient    Laurent Kiser   Speech Language Pathologist Student Intern   The admitting diagnosis and active problem list, as listed below have been reviewed prior to initiation of this evaluation.      ADMITTING DIAGNOSIS: Acute ischemic cerebrovascular accident (CVA) involving left middle cerebral artery territory

## 2019-06-20 NOTE — PROGRESS NOTES
from carotid. Carotid stenosis likely 2/2 vascular risk factors +/- radiation vasculopathy. Patient Active Problem List   Diagnosis    [facility] [address]    Oral pharyngeal candidiasis    COPD (chronic obstructive pulmonary disease) ([facility] [address]    Mucositis due to antineoplastic therapy    Thrush of mouth and esophagus (HCC)    Pancytopenia ([facility] [address]    Febrile neutropenia (HCC)    Acute ischemic cerebrovascular accident (CVA) involving left middle cerebral artery territory [facility])       Plan:     Continue aspirin high intensity statin. Goal blood pressure less than 180 has not been an issue. Would like to hold off on addition of plavix for now as carotid appears occluded intracranially. OK to start in 48 hours assuming clinical course is stable. PTOTSPEECH  OK to start tube feeds place fabby/MICHAEL Mendez  4:15 PM  6/20/2019      I spent 35 minutes with the patient, with 50% or more counseling them on their stroke. Discussed with family at bedside.

## 2019-06-20 NOTE — PROGRESS NOTES
OCCUPATIONAL THERAPY INITIAL EVALUATION      Date:2019  Patient Name: Barber Reza  MRN: [id number]  : 1950  Room: [address] [place] [address], OTR/L #[id number]    AM-PAC Daily Activity Raw Score:   Recommended Adaptive Equipment: TBD     Diagnosis: Acute ischemic cerebrovascular accident (CVA) involving left middle cerebral artery territory [facility]) [I63.512]  Acute ischemic cerebrovascular accident (CVA) involving left middle cerebral artery territory [facility]) [I63.512]     Modified Leonel Scale (MRS)  Score     Description  0             No symptoms  1             No significant disability despite symptoms  2             Slight disability; able to look after own affairs  3             Moderate disability; able to ambulate without assist/ requires assist with ADLs  4             Moderate/Severe disability;requires assist to ambulate/assist with ADLs  5             Severe disability;bedridden/incontinent   6               Score:   4  Pertinent Medical History: throat CA, COPD, R jaw repair    Precautions:  Falls, R UE flaccidity, nonverbal for majority of session, aphasic, khan, waffle cushion     Home Living: Pt lives with spouse in 2 floor home. 3 PRERNA, 0 handrail  Bathroom setup: tub/shower  Equipment owned: tub bench, ww    Prior Level of Function: independent with ADLs , independent with IADLs; ambulated independently w/o AD  Driving: yes  Occupation: retired  Spouse provided PLOF/home setup information    Pain Level: Pt c/o buttock pain this session ; unable to quantify. Reinforced pain management strategies throughout    Cognition: Alert, unable to formally assess orientation. Pt does nod head 'yes' to name; Follows 1 step directions.   Nonverbal for majority of session, aphasic - pt appropriately shakes head - 'yes,no'   Memory: unable to assess   Sequencing:  fair -   Problem solving:  fair -   Judgement/safety:  fair -     Functional Assessment:   Initial Eval Status  Date: hand placement, posture, body mechanics, weightshifting and safety). Therapist facilitated self-care retraining: LB self-care tasks and seated grooming task while educating pt on modified techniques, posture, safety and energy conservation techniques. Also educated family/pt on importance of R UE exercises and positioning for skin integrity. Skilled monitoring of HR, O2 sats and pts response to treatment. At end of session, patient seated in chair (w/ pillow props d/t lateral lean to R. Family present) with call light and phone within reach, all lines and tubes intact. Pt would benefit from continued skilled OT to increase functional independence and quality of life. mod  Profile and History- med (extensive chart review)  Assessment of Occupational Performance and Identification of Deficits- med  Clinical Decision Making- med    Evaluation time includes thorough review of current medical information, gathering information on past medical history/social history and prior level of function, completion of standardized testing/informal observation of tasks, assessment of data, and development of POC/Goals.       Assessment of current deficits  Functional mobility [x]  ADLs [x] Strength [x]  Cognition [x]  Functional transfers  [x] IADLs [] Safety Awareness [x]  Endurance [x]  Fine Motor Coordination [x] Balance [x] Vision/perception [] Sensation []   Gross Motor Coordination [x] ROM [x] Delirium []                  Motor Control []    Plan of Care:   ADL retraining [x]   Equipment needs [x]   Neuromuscular re-education [x] Energy Conservation Techniques [x]  Functional Transfer training [x] Patient and/or Family Education [x]  Functional Mobility training [x]  Environmental Modifications [x]  Cognitive re-training [x]   Compensatory techniques for ADLs [x]  Splinting Needs []   Positioning to improve overall function [x]   Therapeutic Activity [x]  Therapeutic Exercise  [x]  Visual/Perceptual: []    Delirium

## 2019-06-21 ENCOUNTER — ANESTHESIA EVENT (OUTPATIENT)
Dept: ENDOSCOPY | Age: 69
DRG: 064 | End: 2019-06-21
Payer: MEDICARE

## 2019-06-21 ENCOUNTER — ANESTHESIA (OUTPATIENT)
Dept: ENDOSCOPY | Age: 69
DRG: 064 | End: 2019-06-21
Payer: MEDICARE

## 2019-06-21 VITALS
RESPIRATION RATE: 26 BRPM | OXYGEN SATURATION: 96 % | DIASTOLIC BLOOD PRESSURE: 87 MMHG | SYSTOLIC BLOOD PRESSURE: 150 MMHG

## 2019-06-21 PROBLEM — E44.0 MODERATE PROTEIN-CALORIE MALNUTRITION (HCC): Status: ACTIVE | Noted: 2019-06-21

## 2019-06-21 LAB
ANION GAP SERPL CALCULATED.3IONS-SCNC: 8 MMOL/L (ref 7–16)
BUN BLDV-MCNC: 9 MG/DL (ref 8–23)
CALCIUM SERPL-MCNC: 8.6 MG/DL (ref 8.6–10.2)
CHLORIDE BLD-SCNC: 108 MMOL/L (ref 98–107)
CO2: 22 MMOL/L (ref 22–29)
CREAT SERPL-MCNC: 0.6 MG/DL (ref 0.7–1.2)
GFR AFRICAN AMERICAN: >60
GFR NON-AFRICAN AMERICAN: >60 ML/MIN/1.73
GLUCOSE BLD-MCNC: 121 MG/DL (ref 74–99)
HCT VFR BLD CALC: 35.3 % (ref 37–54)
HEMOGLOBIN: 11.7 G/DL (ref 12.5–16.5)
LV EF: 55 %
LVEF MODALITY: NORMAL
MAGNESIUM: 2.1 MG/DL (ref 1.6–2.6)
MCH RBC QN AUTO: 31.1 PG (ref 26–35)
MCHC RBC AUTO-ENTMCNC: 33.1 % (ref 32–34.5)
MCV RBC AUTO: 93.9 FL (ref 80–99.9)
PDW BLD-RTO: 14.6 FL (ref 11.5–15)
PHOSPHORUS: 2 MG/DL (ref 2.5–4.5)
PLATELET # BLD: 226 E9/L (ref 130–450)
PMV BLD AUTO: 10.4 FL (ref 7–12)
POTASSIUM SERPL-SCNC: 3.5 MMOL/L (ref 3.5–5)
RBC # BLD: 3.76 E12/L (ref 3.8–5.8)
SODIUM BLD-SCNC: 138 MMOL/L (ref 132–146)
WBC # BLD: 8 E9/L (ref 4.5–11.5)

## 2019-06-21 PROCEDURE — 3700000001 HC ADD 15 MINUTES (ANESTHESIA): Performed by: SURGERY

## 2019-06-21 PROCEDURE — 93306 TTE W/DOPPLER COMPLETE: CPT

## 2019-06-21 PROCEDURE — 3609018000 HC EGD ESOPHAGOGASTRODUODENOSCOPY PEG TUBE INSERTION: Performed by: SURGERY

## 2019-06-21 PROCEDURE — 97110 THERAPEUTIC EXERCISES: CPT

## 2019-06-21 PROCEDURE — 7100000000 HC PACU RECOVERY - FIRST 15 MIN: Performed by: SURGERY

## 2019-06-21 PROCEDURE — 6360000002 HC RX W HCPCS: Performed by: STUDENT IN AN ORGANIZED HEALTH CARE EDUCATION/TRAINING PROGRAM

## 2019-06-21 PROCEDURE — 2580000003 HC RX 258: Performed by: HOSPITALIST

## 2019-06-21 PROCEDURE — 6360000002 HC RX W HCPCS: Performed by: NURSE ANESTHETIST, CERTIFIED REGISTERED

## 2019-06-21 PROCEDURE — 2709999900 HC NON-CHARGEABLE SUPPLY: Performed by: SURGERY

## 2019-06-21 PROCEDURE — 80048 BASIC METABOLIC PNL TOTAL CA: CPT

## 2019-06-21 PROCEDURE — 6370000000 HC RX 637 (ALT 250 FOR IP): Performed by: HOSPITALIST

## 2019-06-21 PROCEDURE — 2500000003 HC RX 250 WO HCPCS: Performed by: SURGERY

## 2019-06-21 PROCEDURE — 3700000000 HC ANESTHESIA ATTENDED CARE: Performed by: SURGERY

## 2019-06-21 PROCEDURE — 97530 THERAPEUTIC ACTIVITIES: CPT

## 2019-06-21 PROCEDURE — 85027 COMPLETE CBC AUTOMATED: CPT

## 2019-06-21 PROCEDURE — 0DH68UZ INSERTION OF FEEDING DEVICE INTO STOMACH, VIA NATURAL OR ARTIFICIAL OPENING ENDOSCOPIC: ICD-10-PCS | Performed by: SURGERY

## 2019-06-21 PROCEDURE — 2500000003 HC RX 250 WO HCPCS: Performed by: HOSPITALIST

## 2019-06-21 PROCEDURE — 7100000001 HC PACU RECOVERY - ADDTL 15 MIN: Performed by: SURGERY

## 2019-06-21 PROCEDURE — 83735 ASSAY OF MAGNESIUM: CPT

## 2019-06-21 PROCEDURE — 36415 COLL VENOUS BLD VENIPUNCTURE: CPT

## 2019-06-21 PROCEDURE — 99232 SBSQ HOSP IP/OBS MODERATE 35: CPT | Performed by: NURSE PRACTITIONER

## 2019-06-21 PROCEDURE — 84100 ASSAY OF PHOSPHORUS: CPT

## 2019-06-21 PROCEDURE — 2580000003 HC RX 258: Performed by: NURSE ANESTHETIST, CERTIFIED REGISTERED

## 2019-06-21 PROCEDURE — 43246 EGD PLACE GASTROSTOMY TUBE: CPT | Performed by: SURGERY

## 2019-06-21 PROCEDURE — 97535 SELF CARE MNGMENT TRAINING: CPT

## 2019-06-21 PROCEDURE — 97112 NEUROMUSCULAR REEDUCATION: CPT

## 2019-06-21 PROCEDURE — 2060000000 HC ICU INTERMEDIATE R&B

## 2019-06-21 RX ORDER — HYDROCODONE BITARTRATE AND ACETAMINOPHEN 5; 325 MG/1; MG/1
1 TABLET ORAL PRN
Status: DISCONTINUED | OUTPATIENT
Start: 2019-06-21 | End: 2019-06-21 | Stop reason: HOSPADM

## 2019-06-21 RX ORDER — PROPOFOL 10 MG/ML
INJECTION, EMULSION INTRAVENOUS PRN
Status: DISCONTINUED | OUTPATIENT
Start: 2019-06-21 | End: 2019-06-21 | Stop reason: SDUPTHER

## 2019-06-21 RX ORDER — LIDOCAINE HYDROCHLORIDE 10 MG/ML
INJECTION, SOLUTION INFILTRATION; PERINEURAL PRN
Status: DISCONTINUED | OUTPATIENT
Start: 2019-06-21 | End: 2019-06-21 | Stop reason: ALTCHOICE

## 2019-06-21 RX ORDER — MORPHINE SULFATE 2 MG/ML
2 INJECTION, SOLUTION INTRAMUSCULAR; INTRAVENOUS EVERY 5 MIN PRN
Status: DISCONTINUED | OUTPATIENT
Start: 2019-06-21 | End: 2019-06-21 | Stop reason: HOSPADM

## 2019-06-21 RX ORDER — MORPHINE SULFATE 2 MG/ML
1 INJECTION, SOLUTION INTRAMUSCULAR; INTRAVENOUS EVERY 5 MIN PRN
Status: DISCONTINUED | OUTPATIENT
Start: 2019-06-21 | End: 2019-06-21 | Stop reason: HOSPADM

## 2019-06-21 RX ORDER — POTASSIUM CHLORIDE 7.45 MG/ML
10 INJECTION INTRAVENOUS
Status: COMPLETED | OUTPATIENT
Start: 2019-06-21 | End: 2019-06-21

## 2019-06-21 RX ORDER — PROMETHAZINE HYDROCHLORIDE 25 MG/ML
6.25 INJECTION, SOLUTION INTRAMUSCULAR; INTRAVENOUS EVERY 10 MIN PRN
Status: DISCONTINUED | OUTPATIENT
Start: 2019-06-21 | End: 2019-06-21 | Stop reason: HOSPADM

## 2019-06-21 RX ORDER — CEFAZOLIN SODIUM 1 G/3ML
INJECTION, POWDER, FOR SOLUTION INTRAMUSCULAR; INTRAVENOUS PRN
Status: DISCONTINUED | OUTPATIENT
Start: 2019-06-21 | End: 2019-06-21 | Stop reason: SDUPTHER

## 2019-06-21 RX ORDER — BISACODYL 10 MG
10 SUPPOSITORY, RECTAL RECTAL DAILY PRN
Status: DISCONTINUED | OUTPATIENT
Start: 2019-06-21 | End: 2019-06-28 | Stop reason: HOSPADM

## 2019-06-21 RX ORDER — HYDROCODONE BITARTRATE AND ACETAMINOPHEN 5; 325 MG/1; MG/1
2 TABLET ORAL PRN
Status: DISCONTINUED | OUTPATIENT
Start: 2019-06-21 | End: 2019-06-21 | Stop reason: HOSPADM

## 2019-06-21 RX ORDER — SODIUM CHLORIDE 9 MG/ML
INJECTION, SOLUTION INTRAVENOUS CONTINUOUS PRN
Status: DISCONTINUED | OUTPATIENT
Start: 2019-06-21 | End: 2019-06-21 | Stop reason: SDUPTHER

## 2019-06-21 RX ORDER — MEPERIDINE HYDROCHLORIDE 50 MG/ML
12.5 INJECTION INTRAMUSCULAR; INTRAVENOUS; SUBCUTANEOUS EVERY 5 MIN PRN
Status: DISCONTINUED | OUTPATIENT
Start: 2019-06-21 | End: 2019-06-21 | Stop reason: HOSPADM

## 2019-06-21 RX ADMIN — POTASSIUM CHLORIDE 10 MEQ: 7.46 INJECTION, SOLUTION INTRAVENOUS at 09:57

## 2019-06-21 RX ADMIN — POTASSIUM CHLORIDE 10 MEQ: 7.46 INJECTION, SOLUTION INTRAVENOUS at 08:49

## 2019-06-21 RX ADMIN — PREDNISOLONE ACETATE 1 DROP: 10 SUSPENSION/ DROPS OPHTHALMIC at 05:44

## 2019-06-21 RX ADMIN — DEXTROSE AND SODIUM CHLORIDE: 5; 900 INJECTION, SOLUTION INTRAVENOUS at 09:56

## 2019-06-21 RX ADMIN — POTASSIUM PHOSPHATE, MONOBASIC AND POTASSIUM PHOSPHATE, DIBASIC 30 MMOL: 224; 236 INJECTION, SOLUTION, CONCENTRATE INTRAVENOUS at 11:28

## 2019-06-21 RX ADMIN — DEXTROSE AND SODIUM CHLORIDE: 5; 900 INJECTION, SOLUTION INTRAVENOUS at 00:41

## 2019-06-21 RX ADMIN — PREDNISOLONE ACETATE 1 DROP: 10 SUSPENSION/ DROPS OPHTHALMIC at 23:14

## 2019-06-21 RX ADMIN — CEFAZOLIN 2000 MG: 1 INJECTION, POWDER, FOR SOLUTION INTRAMUSCULAR; INTRAVENOUS; PARENTERAL at 15:08

## 2019-06-21 RX ADMIN — PROPOFOL 200 MG: 10 INJECTION, EMULSION INTRAVENOUS at 15:06

## 2019-06-21 RX ADMIN — ATORVASTATIN CALCIUM 40 MG: 40 TABLET, FILM COATED ORAL at 21:10

## 2019-06-21 RX ADMIN — PREDNISOLONE ACETATE 1 DROP: 10 SUSPENSION/ DROPS OPHTHALMIC at 18:15

## 2019-06-21 RX ADMIN — SODIUM CHLORIDE: 9 INJECTION, SOLUTION INTRAVENOUS at 15:03

## 2019-06-21 RX ADMIN — PREDNISOLONE ACETATE 1 DROP: 10 SUSPENSION/ DROPS OPHTHALMIC at 11:33

## 2019-06-21 RX ADMIN — DEXTROSE AND SODIUM CHLORIDE: 5; 900 INJECTION, SOLUTION INTRAVENOUS at 18:15

## 2019-06-21 ASSESSMENT — PAIN SCALES - GENERAL
PAINLEVEL_OUTOF10: 0

## 2019-06-21 NOTE — PLAN OF CARE
Problem: Malnutrition  (NI-5.2)  Goal: Food and/or Nutrient Delivery  Description: Initiate TF with Standard with fiber @60ml/hr + 1 Pro Mod daily  Individualized approach for food/nutrient provision.   Outcome: Met This Shift

## 2019-06-21 NOTE — CONSULTS
status: Not on file    Intimate partner violence:     Fear of current or ex partner: Not on file     Emotionally abused: Not on file     Physically abused: Not on file     Forced sexual activity: Not on file   Other Topics Concern    Not on file   Social History Narrative    Not on file     Vitals:    06/20/19 2026   BP: (!) 151/72   Pulse: 72   Resp: 20   Temp: 98.8 °F (37.1 °C)   SpO2: 94%       General: Adult male in no distress  HEENT: PERRL; moist mucous membranes  Cardiovascular: Regular  Respirations: Nonlabored  Abdomen: Soft; nontender/nondistended; previous PEG site noted  Skin: Warm/dry  Extremities: Minimal right-sided movement    Patient Active Problem List    Diagnosis Date Noted    Mucositis due to antineoplastic therapy 09/22/2012     Priority: High    Febrile neutropenia ([address] 09/22/2012     Priority: High    Thrush of mouth and esophagus ([address] 09/22/2012     Priority: Medium    Pancytopenia ([address] 09/22/2012     Priority: Medium    Acute ischemic cerebrovascular accident (CVA) involving left middle cerebral artery territory ([address] 06/19/2019    Esophageal cancer ([address] 08/09/2012    Oral pharyngeal candidiasis 08/09/2012    COPD (chronic obstructive pulmonary disease) ([address] 08/09/2012       Dysphasia--recommend PEG. The patient was explained the risks/benefits/alternatives/expected outcomes of the procedure. The patient was explained the risks of the procedure, including, but not limited to, the risk of reaction to the anesthesia medicine, bleeding, infection, damage to bowel or surrounding structures, and the risk of perforation requiring further surgery. All questions were answered. The patient nodded his head yes to understanding and agreed to proceed. Reid Claros MD, FACS  6/20/2019  9:30 PM      NOTE: This report was transcribed using voice recognition software.  Every effort was made to ensure accuracy; however, inadvertent computerized transcription errors may be

## 2019-06-21 NOTE — ANESTHESIA PRE PROCEDURE
Department of Anesthesiology  Preprocedure Note       Name:  Rene Winn   Age:  80 y.o.  :  1950                                          MRN:  [id number]         Date:  2019      Surgeon: Stanley Postal):  Doug Hernandez MD    Procedure: EGD PEG TUBE INSERTION (N/A )    Medications prior to admission:   Prior to Admission medications    Medication Sig Start Date End Date Taking?  Authorizing Provider   prednisoLONE acetate (PRED FORTE) 1 % ophthalmic suspension Place 1 drop into the right eye 4 times daily   Yes Historical Provider, MD       Current medications:    Current Facility-Administered Medications   Medication Dose Route Frequency Provider Last Rate Last Dose    ceFAZolin (ANCEF) 2 g in dextrose 5 % 50 mL IVPB  2 g Intravenous On Call to Octavio De Souza MD        aspirin suppository 300 mg  300 mg Rectal Daily Bjorn Shen MD   Stopped at 19 0825    sodium chloride flush 0.9 % injection 10 mL  10 mL Intravenous 2 times per day Isaac Tamez MD   10 mL at 19    sodium chloride flush 0.9 % injection 10 mL  10 mL Intravenous PRN Isaac Tamez MD        magnesium hydroxide (MILK OF MAGNESIA) 400 MG/5ML suspension 30 mL  30 mL Oral Daily PRN Isaac Tamez MD        ondansetron (ZOFRAN) injection 4 mg  4 mg Intravenous Q6H PRN Isaac Tamez MD        atorvastatin (LIPITOR) tablet 40 mg  40 mg Oral Nightly Isaac Tamez MD        dextrose 5 % and 0.9 % sodium chloride infusion   Intravenous Continuous Isaac Tamez  mL/hr at 19 0041      aspirin chewable tablet 81 mg  81 mg Oral Daily Grace Alfredo, APRN - CNP        prednisoLONE acetate (PRED FORTE) 1 % ophthalmic suspension 1 drop  1 drop Right Eye 4 times per day Ubaldo Manuel MD   1 drop at 19 0544     Facility-Administered Medications Ordered in Other Encounters   Medication Dose Route Frequency Provider Last Rate Last Dose    filgrastim (NEUPOGEN) injection 300 mcg  300 mcg Subcutaneous Once Alanna High MD           Allergies:  No Known Allergies    Problem List:    Patient Active Problem List   Diagnosis Code    Esophageal cancer ([facility] C15.9    Oral pharyngeal candidiasis B37.0    COPD (chronic obstructive pulmonary disease) ([facility] J44.9    Mucositis due to antineoplastic therapy K12.31    Thrush of mouth and esophagus ([facility]CC) B37.81, B37.0    Pancytopenia ([facility]) D61.818    Febrile neutropenia (HCC) D70.9, R50.81    Acute ischemic cerebrovascular accident (CVA) involving left middle cerebral artery territory [facility]) K30.213    Cerebrovascular accident (CVA) due to occlusion of middle cerebral artery ([facility] I63.519    Dysphagia, oropharyngeal R13.12       Past Medical History:        Diagnosis Date    Cancer ([facility] 2012    throat    COPD (chronic obstructive pulmonary disease) ([facility]        Past Surgical History:        Procedure Laterality Date    DENTAL SURGERY Bilateral 2018    FRACTURE SURGERY      right thumb    MOUTH BIOPSY  2012    right lateral pharynx biopsy.        Social History:    Social History     Tobacco Use    Smoking status: Former Smoker     Packs/day: 1.50     Years: 40.00     Pack years: 60.00     Last attempt to quit: 1/15/2010     Years since quittin.4    Smokeless tobacco: Never Used   Substance Use Topics    Alcohol use: No     Comment: occasional                                Counseling given: Not Answered      Vital Signs (Current):   Vitals:    19 2026 19 2341 19 0454 19 0742   BP: (!) 151/72 126/81 (!) 145/72 131/74   Pulse: 72 71 67 72   Resp:    Temp: 98.8 °F (37.1 °C) 98.1 °F (36.7 °C) 98.4 °F (36.9 °C) 97.8 °F (36.6 °C)   TempSrc: Oral Oral Axillary Oral   SpO2: 94% 94%  95%   Weight:   158 lb 3 oz (71.8 kg)    Height:                                                  BP Readings from Last 3 Encounters:   19 131/74   19 (!) 136/95   18 134/81       NPO Status: BMI:   Wt Readings from Last 3 Encounters:   06/21/19 158 lb 3 oz (71.8 kg)   06/19/19 155 lb (70.3 kg)   12/21/18 155 lb (70.3 kg)     Body mass index is 21.45 kg/m². CBC:   Lab Results   Component Value Date    WBC 8.0 06/21/2019    RBC 3.76 06/21/2019    HGB 11.7 06/21/2019    HCT 35.3 06/21/2019    MCV 93.9 06/21/2019    RDW 14.6 06/21/2019     06/21/2019       CMP:   Lab Results   Component Value Date     06/21/2019    K 3.5 06/21/2019     06/21/2019    CO2 22 06/21/2019    BUN 9 06/21/2019    CREATININE 0.6 06/21/2019    GFRAA >60 06/21/2019    LABGLOM >60 06/21/2019    GLUCOSE 121 06/21/2019    PROT 7.1 04/06/2017    CALCIUM 8.6 06/21/2019    BILITOT 0.4 04/06/2017    ALKPHOS 68 04/06/2017    AST 14 04/06/2017    ALT 19 04/06/2017       POC Tests:   Recent Labs     06/19/19  0520   POCGLU 106*   POCNA 130*   POCK >9.0*   POCCL 107   POCBUN 26*       Coags:   Lab Results   Component Value Date    PROTIME 11.6 06/19/2019    INR 1.0 06/19/2019    APTT 28.1 06/19/2019       HCG (If Applicable): No results found for: PREGTESTUR, PREGSERUM, HCG, HCGQUANT     ABGs: No results found for: PHART, PO2ART, BCR3GRE, ALQ9XXE, BEART, Z2GCELQB     Type & Screen (If Applicable):  No results found for: LABABO, [address]    Anesthesia Evaluation  Patient summary reviewed  Airway:         Dental:          Pulmonary:   (+) COPD:                            ROS comment: Former Smoker. Cardiovascular:Negative CV ROS                      Neuro/Psych:   Negative Neuro/Psych ROS              GI/Hepatic/Renal: Neg GI/Hepatic/Renal ROS            Endo/Other:                      ROS comment: Cancer Throat. Abdominal:           Vascular: negative vascular ROS. Anesthesia Plan      MAC     ASA 3       Induction: intravenous. Anesthetic plan and risks discussed with patient.       Plan discussed with CRNA.    Attending anesthesiologist reviewed and agrees with Jin Diego MD   6/21/2019

## 2019-06-21 NOTE — ANESTHESIA POSTPROCEDURE EVALUATION
Department of Anesthesiology  Postprocedure Note    Patient: Eliodoro Jeans  MRN: [id number]  YOB: 1950  Date of evaluation: 6/21/2019  Time:  7:16 PM     Procedure Summary     Date:  06/21/19 Room / Location:  [facility] 02 / [facility] ENDOSCOPY    Anesthesia Start:  1503 Anesthesia Stop:      Procedure:  EGD PEG TUBE INSERTION (N/A ) Diagnosis:  (dysphagia)    Surgeon:  Patricia Christensne MD Responsible Provider:  Patricia Faust MD    Anesthesia Type:  MAC ASA Status:  3          Anesthesia Type: MAC    Hansa Phase I: Hansa Score: 9    Hansa Phase II:      Last vitals: Reviewed and per EMR flowsheets.        Anesthesia Post Evaluation    Patient location during evaluation: PACU  Patient participation: complete - patient participated  Level of consciousness: awake  Pain score: 3  Airway patency: patent  Nausea & Vomiting: no nausea and no vomiting  Complications: no  Cardiovascular status: blood pressure returned to baseline  Respiratory status: acceptable  Hydration status: euvolemic

## 2019-06-21 NOTE — PROGRESS NOTES
Called placed to endo regarding when Peg would be placed. Stated they will probably send for patient around 2 pm but they are running behind so that time is not guaranteed.  Family notified

## 2019-06-21 NOTE — PROGRESS NOTES
well        Education:  Pt & spouse was educated through out treatment regarding proper technique & safety with bed mobility, functional transfers, educated again on importance of positioning R UE due to flaccidity, ADL compensatory strategies & mellisa dressing techniques to ease tasks to improve safety & prevent falls and allow pt to return home safely. Comments: Upon arrival pt was in bed resting, easy to arouse, spouse present & agreeable for therapy. At end of session pt was up in the chair, cushion present all lines and tubes intact, call light within reach & spouse still present. · Pt has made Good progress towards set goals. · Continue with current plan of care    Time in: 8:40am  Time out: 9:05am  Total Tx Time: 25 minutes    This therapist returned to assist pt back to bed per staff request, Mod A sit to stand, Mod A stand pivot from chair back to EOB. Mod A sit to supine, ensured pt was comfortable in bed, rolling onto his L side due to c/o sacral/buttock pain with Good results, spouse still present. R UE supported with pillows as well as R LE, B heels off the bed, call light within reach. Spouse still present. Pt tolerated up in chair for 15 minutes last session, this session pt was able to tolerate an hour. Time In: 10:05am  Time Out: 10:19am  Total Tx time: 14 minutes    Jimbo Steel.  [address] [place] [address], [address] [place] [address]

## 2019-06-21 NOTE — OP NOTE
[facility]  ENDOSCOPY LAB  ENDOSCOPY REPORT  6/21/2019    DATE OF PROCEDURE: 6/21/2019    SURGEON: SEJAL Wright: Chey Lackey M.D. PREOPERATIVE DIAGNOSIS: Oropharyngeal dysphagia. POSTOPERATIVE DIAGNOSIS: Oropharyngeal dysphagia. OPERATION: Esophagogastroduodenoscopy with percutaneous endoscopic gastrostomy. ANESTHESIA: Local monitored anesthesia care. ESTIMATED BLOOD LOSS: Minimal.    IV FLUIDS: Per Anesthesia. COMPLICATIONS: None. CONDITION: Stable. SPECIMEN: None. DISPOSITION: Recovery then return to floor. INDICATIONS: 80year old male with dysphagia after Left MCA stroke. It was, therefore, recommended that the patient undergo an esophagogastroduodenoscopy with insertion of a gastrostomy tube. The risks, benefits, complications, and alternatives of this procedure, including the risks of bleeding, infection, injury to intraabdominal organs, including the colon and liver, and anesthesia reactions, were explained to the patient and family. They understood and agreed to proceed. PROCEDURE: The patient was taken to the endoscopy suite and positioned supine on the endoscopy table. The patient was placed under local monitored anesthesia care. A mouthpiece was placed in the patient's mouth. The endoscope was then passed into the patient's mouth and down the esophagus under direct visualization. There were no abnormalities of the esophagus. The endoscope was passed through the stomach which was then insufflated. There were no abnormalities of the stomach. The endoscope was passed into the duodenum all of the way to the third portion of the duodenum. A picture was taken. There were no abnormalities of the duodenum. The endoscope was pulled back into the stomach and retroflexed. There were no hiatal hernias or other abnormalities at the gastric fundus. The endoscope was again anteflexed, and the anterior abdominal wall was prepped. The light from the endoscope could be seen through the anterior abdominal wall, and palpation could be translated across the abdominal wall and visualized in the stomach by endoscopy. Local anesthetic was infiltrated into the skin and subcutaneous tissue at the abdominal wall. A #11-blade scalpel was used to make a small skin incision. An angiocatheter was then inserted into the abdominal wall and into the stomach under visualization with the endoscope. This was passed without difficulty. A guidewire was then passed through the angiocatheter, and this was grasped using a snare through the endoscope. The guidewire was pulled back out through the patient's mouth with the endoscope. The guidewire was attached to the gastrostomy tube, and this was pulled back through the patient's mouth and into the stomach. The endoscope was passed into the patient's mouth and down the esophagus into the stomach once again. The gastrostomy tube was visualized in good position in the distal stomach. There was no bleeding from the gastrostomy site. Insufflation was suctioned from the stomach, and the endoscope was removed. The gastrostomy tube was secured to the anterior abdominal wall using the hub. The gastrostomy tube was placed to gravity drainage. The patient tolerated the procedure well. There were no complications. The patient will be observed in recovery and then returned to the floor. The patient will have tube feedings initiated in the morning.       Yeni Rodríguez DO  6/21/2019  3:22 PM

## 2019-06-21 NOTE — PROGRESS NOTES
Hospitalist Progress Note      PCP: Kirti Aquino DO    Date of Admission: 6/19/2019      Subjective: Wife at bedside  Failed swallow eval  For peg      Medications:  Reviewed    Infusion Medications    dextrose 5 % and 0.9 % NaCl 100 mL/hr at 06/21/19 0956     Scheduled Medications    ceFAZolin  2 g Intravenous On Call to OR    potassium phosphate IVPB  30 mmol Intravenous Once    aspirin  300 mg Rectal Daily    sodium chloride flush  10 mL Intravenous 2 times per day    atorvastatin  40 mg Oral Nightly    aspirin  81 mg Oral Daily    prednisoLONE acetate  1 drop Right Eye 4 times per day     PRN Meds: sodium chloride flush, magnesium hydroxide, ondansetron      Intake/Output Summary (Last 24 hours) at 6/21/2019 1106  Last data filed at 6/21/2019 0953  Gross per 24 hour   Intake 1928.67 ml   Output 1650 ml   Net 278.67 ml       Physical Exam Performed:    /74   Pulse 72   Temp 97.8 °F (36.6 °C) (Oral)   Resp 18   Ht 6' (1.829 m)   Wt 158 lb 3 oz (71.8 kg)   SpO2 95%   BMI 21.45 kg/m²     Constitutional/General: lethargic, not oriented; moderate distress, thin; follows directions    Head: Normocephalic and atraumatic  Eyes: PERRL, EOMI, conjunctive normal, sclera non icteric  Mouth: Oropharynx clear, handling secretions, no trismus, no asymmetry of the posterior oropharynx or uvular edema  Neck: Supple, full ROM, non tender to palpation in the midline, no stridor, no crepitus, no meningeal signs  Respiratory: Lungs clear to auscultation bilaterally, no wheezes, rales, or rhonchi. Not in respiratory distress  Cardiovascular:  Regular rate. Regular rhythm. No murmurs, gallops, or rubs. 2+ distal pulses  GI:  Abdomen Soft, Non tender, Non distended.  +BS. No organomegaly, no palpable masses,  No rebound, guarding, or rigidity. Musculoskeletal: Moves all extremities x 4. Warm and well perfused, no clubbing, cyanosis, or edema.  Capillary refill <3 seconds  Neurologic: right side paralyzed with minor movement to pain, sensation intact in BL UE and LE; CN II-XII grossly intact except for facial droop on left,  nih 16  Psychiatric: flat affect;              Labs:   Recent Labs     06/19/19  0510 06/20/19  0551 06/21/19  0455   WBC 7.0 9.1 8.0   HGB 12.0* 12.0* 11.7*   HCT 36.6* 36.4* 35.3*    250 226     Recent Labs     06/19/19  0510 06/19/19  0520 06/20/19  0551 06/21/19  0455     --  142 138   K 4.3  --  3.8 3.5     --  109* 108*   CO2 26 26 23 22   BUN 17  --  12 9   CREATININE 0.7 0.7 0.6* 0.6*   CALCIUM 9.0  --  8.7 8.6   PHOS  --   --  1.9* 2.0*     No results for input(s): AST, ALT, BILIDIR, BILITOT, ALKPHOS in the last 72 hours. Recent Labs     06/19/19  0510   INR 1.0     Recent Labs     06/19/19  0510   TROPONINI <0.01       Urinalysis:      Lab Results   Component Value Date    NITRU Negative 06/19/2019    WBCUA 2-5 06/19/2019    BACTERIA RARE 06/19/2019    RBCUA 0-1 06/19/2019    BLOODU Negative 06/19/2019    SPECGRAV 1.020 06/19/2019    GLUCOSEU Negative 06/19/2019       Radiology:  FL MODIFIED BARIUM SWALLOW W VIDEO   Final Result   Study was remarkable for aspiration of thin, nectar,   honey, and pudding consistencies with positive cough reflex with   aspiration. Patient also demonstrated an oral delay. There was   evidence for retention within the vallecula and piriform sinuses. There was also a reduced laryngeal elevation noted during the exam.      This procedure was performed by Amanda Nielsen CNP under the   indirect supervision of  Norm Levin MD.      The exam has been dictated and signed by Edin Eagle. Luna Nielsen CNP. Susan Escobedo MD, Radiologist, have reviewed the images and   report and concur with these findings. MRI BRAIN WO CONTRAST   Final Result   Left-sided recent infarct involving the basal ganglia and   left caudate head extensively and also involving the left insula and   anterior temporal lobe.  Small recent

## 2019-06-22 LAB
ANION GAP SERPL CALCULATED.3IONS-SCNC: 13 MMOL/L (ref 7–16)
BUN BLDV-MCNC: 5 MG/DL (ref 8–23)
CALCIUM SERPL-MCNC: 8.8 MG/DL (ref 8.6–10.2)
CHLORIDE BLD-SCNC: 104 MMOL/L (ref 98–107)
CO2: 22 MMOL/L (ref 22–29)
CREAT SERPL-MCNC: 0.6 MG/DL (ref 0.7–1.2)
GFR AFRICAN AMERICAN: >60
GFR NON-AFRICAN AMERICAN: >60 ML/MIN/1.73
GLUCOSE BLD-MCNC: 123 MG/DL (ref 74–99)
HCT VFR BLD CALC: 38.3 % (ref 37–54)
HEMOGLOBIN: 13 G/DL (ref 12.5–16.5)
MAGNESIUM: 1.8 MG/DL (ref 1.6–2.6)
MCH RBC QN AUTO: 31.3 PG (ref 26–35)
MCHC RBC AUTO-ENTMCNC: 33.9 % (ref 32–34.5)
MCV RBC AUTO: 92.1 FL (ref 80–99.9)
PDW BLD-RTO: 14.1 FL (ref 11.5–15)
PHOSPHORUS: 2.6 MG/DL (ref 2.5–4.5)
PLATELET # BLD: 233 E9/L (ref 130–450)
PMV BLD AUTO: 10.4 FL (ref 7–12)
POTASSIUM SERPL-SCNC: 3.5 MMOL/L (ref 3.5–5)
RBC # BLD: 4.16 E12/L (ref 3.8–5.8)
SODIUM BLD-SCNC: 139 MMOL/L (ref 132–146)
WBC # BLD: 8.3 E9/L (ref 4.5–11.5)

## 2019-06-22 PROCEDURE — 2060000000 HC ICU INTERMEDIATE R&B

## 2019-06-22 PROCEDURE — 36415 COLL VENOUS BLD VENIPUNCTURE: CPT

## 2019-06-22 PROCEDURE — 6370000000 HC RX 637 (ALT 250 FOR IP): Performed by: NURSE PRACTITIONER

## 2019-06-22 PROCEDURE — 85027 COMPLETE CBC AUTOMATED: CPT

## 2019-06-22 PROCEDURE — 6370000000 HC RX 637 (ALT 250 FOR IP): Performed by: HOSPITALIST

## 2019-06-22 PROCEDURE — 83735 ASSAY OF MAGNESIUM: CPT

## 2019-06-22 PROCEDURE — 84100 ASSAY OF PHOSPHORUS: CPT

## 2019-06-22 PROCEDURE — 80048 BASIC METABOLIC PNL TOTAL CA: CPT

## 2019-06-22 PROCEDURE — 2580000003 HC RX 258: Performed by: HOSPITALIST

## 2019-06-22 PROCEDURE — 6370000000 HC RX 637 (ALT 250 FOR IP): Performed by: PSYCHIATRY & NEUROLOGY

## 2019-06-22 RX ORDER — CLOPIDOGREL BISULFATE 75 MG/1
75 TABLET ORAL DAILY
Status: DISCONTINUED | OUTPATIENT
Start: 2019-06-22 | End: 2019-06-28 | Stop reason: HOSPADM

## 2019-06-22 RX ADMIN — ASPIRIN 81 MG 81 MG: 81 TABLET ORAL at 10:04

## 2019-06-22 RX ADMIN — CLOPIDOGREL 75 MG: 75 TABLET, FILM COATED ORAL at 15:30

## 2019-06-22 RX ADMIN — PREDNISOLONE ACETATE 1 DROP: 10 SUSPENSION/ DROPS OPHTHALMIC at 12:14

## 2019-06-22 RX ADMIN — DEXTROSE AND SODIUM CHLORIDE: 5; 900 INJECTION, SOLUTION INTRAVENOUS at 14:09

## 2019-06-22 RX ADMIN — PREDNISOLONE ACETATE 1 DROP: 10 SUSPENSION/ DROPS OPHTHALMIC at 23:59

## 2019-06-22 RX ADMIN — PREDNISOLONE ACETATE 1 DROP: 10 SUSPENSION/ DROPS OPHTHALMIC at 05:55

## 2019-06-22 RX ADMIN — ATORVASTATIN CALCIUM 40 MG: 40 TABLET, FILM COATED ORAL at 21:45

## 2019-06-22 RX ADMIN — PREDNISOLONE ACETATE 1 DROP: 10 SUSPENSION/ DROPS OPHTHALMIC at 18:09

## 2019-06-22 RX ADMIN — BISACODYL 10 MG RECTAL SUPPOSITORY 10 MG: at 01:54

## 2019-06-22 ASSESSMENT — PAIN SCALES - GENERAL: PAINLEVEL_OUTOF10: 5

## 2019-06-22 NOTE — PROGRESS NOTES
Recent Labs     06/20/19  0551 06/21/19  0455 06/22/19  0747   WBC 9.1 8.0 8.3   HGB 12.0* 11.7* 13.0   HCT 36.4* 35.3* 38.3    226 233     Recent Labs     06/20/19  0551 06/21/19  0455 06/22/19  0747    138 139   K 3.8 3.5 3.5   * 108* 104   CO2 23 22 22   BUN 12 9 5*   CREATININE 0.6* 0.6* 0.6*   CALCIUM 8.7 8.6 8.8   PHOS 1.9* 2.0* 2.6     No results for input(s): AST, ALT, BILIDIR, BILITOT, ALKPHOS in the last 72 hours. No results for input(s): INR in the last 72 hours. No results for input(s): [place] N[place] in the last 72 hours. Urinalysis:      Lab Results   Component Value Date    NITRU Negative 06/19/2019    WBCUA 2-5 06/19/2019    BACTERIA RARE 06/19/2019    RBCUA 0-1 06/19/2019    BLOODU Negative 06/19/2019    SPECGRAV 1.020 06/19/2019    GLUCOSEU Negative 06/19/2019       Radiology:  FL MODIFIED BARIUM SWALLOW W VIDEO   Final Result   Study was remarkable for aspiration of thin, nectar,   honey, and pudding consistencies with positive cough reflex with   aspiration. Patient also demonstrated an oral delay. There was   evidence for retention within the vallecula and piriform sinuses. There was also a reduced laryngeal elevation noted during the exam.      This procedure was performed by Amanda Benson CNP under the   indirect supervision of  Nathan Dougherty MD.      The exam has been dictated and signed by Suni Brandt. Joni Benson CNP. Elizabeth Landaverde MD, Radiologist, have reviewed the images and   report and concur with these findings. MRI BRAIN WO CONTRAST   Final Result   Left-sided recent infarct involving the basal ganglia and   left caudate head extensively and also involving the left insula and   anterior temporal lobe. Small recent lacunar infarcts in the left high   parietal lobe as well.          CT BRAIN PERFUSION   Final Result      There is significant core infarct identified, on quantitative software   this is greater than 30 mL volume Assessment/Plan:    Active Hospital Problems    Diagnosis    Moderate protein-calorie malnutrition ([facility] [address] [E44.0]    Carotid artery stenosis, symptomatic, right [I65.21]    Cerebrovascular accident (CVA) due to occlusion of middle cerebral artery (HCC) [I63.519]    Dysphagia, oropharyngeal [R13.12]    Acute ischemic cerebrovascular accident (CVA) involving left middle cerebral artery territory [facility]) [I63.512]     81 yo male hx copd throat cancer transfer from OSH for new cva, pt seen at [facility] for cva, for AMS , nonverbal upon waking up at 1230 am on 6-19-19 ,with rt sided weakness ataxia aphasia , rt sided facial droop and language changes , hx oropharyngeal  cancer  S/p chemo/xrt/surgery 8 yrs ago cancer free since, s/p jaw surgery  Recent in 1-2019 and has copd  NIH score 14 at OSH , imagings showed  acute thrombus/occlusion of the left cavernous ICA which extends into the left MCA.  The left M1, M2, M3, left ICA is completely thrombosed. ekg NSR  And ct perfusion showed significant core infarct identified, on quantitative software this is greater than 30 mL volume, IR intervention not amenable  and being admitted to ICU  Per wife he takes no medicaitons , smokes 1 ppd        ACUTE CVA-- LEFT ICA/LEFT MCA territory--Carotid stenosis likely 2/2 vascular risk factors +/- radiation vasculopathy. Echo was unremarkable for abnormalities.   Smoker  Hx remote ENT cancer , cancer free last 8 yrs per wife  Copd  hypophos  S/p PEG TUBE 6-21-19     Plan:  Asa 81 qd, lipitor 40 mg daily, LDL 94  PT/OT/SPEECH  Ask neuro if plavix can be added today  Replace lytes prn  Start PEG tube feeding/meds  F/u gen sx as OP for wound check abd binder 4 wks      DVT Prophylaxis: scd  Diet: DIET TUBE FEED CONTINUOUS/CYCLIC NPO; STANDARD WITH FIBER; Gastrostomy; Continuous; 25; 60; 24  Code Status: Full Code    PT/OT Eval Status: ordered    [address] PENDING IPR    Tamiko Lew MD

## 2019-06-23 ENCOUNTER — APPOINTMENT (OUTPATIENT)
Dept: CT IMAGING | Age: 69
DRG: 064 | End: 2019-06-23
Payer: MEDICARE

## 2019-06-23 ENCOUNTER — APPOINTMENT (OUTPATIENT)
Dept: ULTRASOUND IMAGING | Age: 69
DRG: 064 | End: 2019-06-23
Payer: MEDICARE

## 2019-06-23 LAB
ANION GAP SERPL CALCULATED.3IONS-SCNC: 11 MMOL/L (ref 7–16)
BUN BLDV-MCNC: 10 MG/DL (ref 8–23)
CALCIUM SERPL-MCNC: 9 MG/DL (ref 8.6–10.2)
CHLORIDE BLD-SCNC: 104 MMOL/L (ref 98–107)
CO2: 25 MMOL/L (ref 22–29)
CREAT SERPL-MCNC: 0.5 MG/DL (ref 0.7–1.2)
GFR AFRICAN AMERICAN: >60
GFR NON-AFRICAN AMERICAN: >60 ML/MIN/1.73
GLUCOSE BLD-MCNC: 140 MG/DL (ref 74–99)
HCT VFR BLD CALC: 36.9 % (ref 37–54)
HEMOGLOBIN: 12.5 G/DL (ref 12.5–16.5)
MAGNESIUM: 1.8 MG/DL (ref 1.6–2.6)
MCH RBC QN AUTO: 31.2 PG (ref 26–35)
MCHC RBC AUTO-ENTMCNC: 33.9 % (ref 32–34.5)
MCV RBC AUTO: 92 FL (ref 80–99.9)
PDW BLD-RTO: 14.3 FL (ref 11.5–15)
PHOSPHORUS: 3 MG/DL (ref 2.5–4.5)
PLATELET # BLD: 250 E9/L (ref 130–450)
PMV BLD AUTO: 10.5 FL (ref 7–12)
POTASSIUM SERPL-SCNC: 3.6 MMOL/L (ref 3.5–5)
PROCALCITONIN: 0.02 NG/ML (ref 0–0.08)
RBC # BLD: 4.01 E12/L (ref 3.8–5.8)
SODIUM BLD-SCNC: 140 MMOL/L (ref 132–146)
WBC # BLD: 10 E9/L (ref 4.5–11.5)

## 2019-06-23 PROCEDURE — 6360000004 HC RX CONTRAST MEDICATION: Performed by: RADIOLOGY

## 2019-06-23 PROCEDURE — 6360000002 HC RX W HCPCS: Performed by: HOSPITALIST

## 2019-06-23 PROCEDURE — 87077 CULTURE AEROBIC IDENTIFY: CPT

## 2019-06-23 PROCEDURE — 2580000003 HC RX 258: Performed by: HOSPITALIST

## 2019-06-23 PROCEDURE — 71275 CT ANGIOGRAPHY CHEST: CPT

## 2019-06-23 PROCEDURE — 97530 THERAPEUTIC ACTIVITIES: CPT

## 2019-06-23 PROCEDURE — 36415 COLL VENOUS BLD VENIPUNCTURE: CPT

## 2019-06-23 PROCEDURE — 80048 BASIC METABOLIC PNL TOTAL CA: CPT

## 2019-06-23 PROCEDURE — 85027 COMPLETE CBC AUTOMATED: CPT

## 2019-06-23 PROCEDURE — 97110 THERAPEUTIC EXERCISES: CPT

## 2019-06-23 PROCEDURE — 83735 ASSAY OF MAGNESIUM: CPT

## 2019-06-23 PROCEDURE — 97535 SELF CARE MNGMENT TRAINING: CPT

## 2019-06-23 PROCEDURE — 6370000000 HC RX 637 (ALT 250 FOR IP): Performed by: HOSPITALIST

## 2019-06-23 PROCEDURE — 6370000000 HC RX 637 (ALT 250 FOR IP): Performed by: NURSE PRACTITIONER

## 2019-06-23 PROCEDURE — 99223 1ST HOSP IP/OBS HIGH 75: CPT | Performed by: INTERNAL MEDICINE

## 2019-06-23 PROCEDURE — 6370000000 HC RX 637 (ALT 250 FOR IP): Performed by: PSYCHIATRY & NEUROLOGY

## 2019-06-23 PROCEDURE — 93970 EXTREMITY STUDY: CPT

## 2019-06-23 PROCEDURE — 84145 PROCALCITONIN (PCT): CPT

## 2019-06-23 PROCEDURE — 97112 NEUROMUSCULAR REEDUCATION: CPT

## 2019-06-23 PROCEDURE — 2060000000 HC ICU INTERMEDIATE R&B

## 2019-06-23 PROCEDURE — 87186 SC STD MICRODIL/AGAR DIL: CPT

## 2019-06-23 PROCEDURE — 87206 SMEAR FLUORESCENT/ACID STAI: CPT

## 2019-06-23 PROCEDURE — 84100 ASSAY OF PHOSPHORUS: CPT

## 2019-06-23 PROCEDURE — 87070 CULTURE OTHR SPECIMN AEROBIC: CPT

## 2019-06-23 RX ORDER — SODIUM CHLORIDE 0.9 % (FLUSH) 0.9 %
10 SYRINGE (ML) INJECTION PRN
Status: DISCONTINUED | OUTPATIENT
Start: 2019-06-23 | End: 2019-06-23 | Stop reason: SDUPTHER

## 2019-06-23 RX ADMIN — PREDNISOLONE ACETATE 1 DROP: 10 SUSPENSION/ DROPS OPHTHALMIC at 11:09

## 2019-06-23 RX ADMIN — Medication 10 ML: at 05:44

## 2019-06-23 RX ADMIN — HYDROMORPHONE HYDROCHLORIDE 0.2 MG: 1 INJECTION, SOLUTION INTRAMUSCULAR; INTRAVENOUS; SUBCUTANEOUS at 19:49

## 2019-06-23 RX ADMIN — HYDROMORPHONE HYDROCHLORIDE 0.2 MG: 1 INJECTION, SOLUTION INTRAMUSCULAR; INTRAVENOUS; SUBCUTANEOUS at 13:28

## 2019-06-23 RX ADMIN — DEXTROSE AND SODIUM CHLORIDE: 5; 900 INJECTION, SOLUTION INTRAVENOUS at 01:03

## 2019-06-23 RX ADMIN — ATORVASTATIN CALCIUM 40 MG: 40 TABLET, FILM COATED ORAL at 21:08

## 2019-06-23 RX ADMIN — CLOPIDOGREL 75 MG: 75 TABLET, FILM COATED ORAL at 09:04

## 2019-06-23 RX ADMIN — HYDROMORPHONE HYDROCHLORIDE 0.2 MG: 1 INJECTION, SOLUTION INTRAMUSCULAR; INTRAVENOUS; SUBCUTANEOUS at 11:09

## 2019-06-23 RX ADMIN — HYDROMORPHONE HYDROCHLORIDE 0.2 MG: 1 INJECTION, SOLUTION INTRAMUSCULAR; INTRAVENOUS; SUBCUTANEOUS at 05:43

## 2019-06-23 RX ADMIN — Medication 10 ML: at 21:08

## 2019-06-23 RX ADMIN — IOPAMIDOL 60 ML: 755 INJECTION, SOLUTION INTRAVENOUS at 20:30

## 2019-06-23 RX ADMIN — PREDNISOLONE ACETATE 1 DROP: 10 SUSPENSION/ DROPS OPHTHALMIC at 17:07

## 2019-06-23 RX ADMIN — ASPIRIN 81 MG 81 MG: 81 TABLET ORAL at 09:04

## 2019-06-23 RX ADMIN — HYDROMORPHONE HYDROCHLORIDE 0.2 MG: 1 INJECTION, SOLUTION INTRAMUSCULAR; INTRAVENOUS; SUBCUTANEOUS at 17:07

## 2019-06-23 RX ADMIN — HYDROMORPHONE HYDROCHLORIDE 0.2 MG: 1 INJECTION, SOLUTION INTRAMUSCULAR; INTRAVENOUS; SUBCUTANEOUS at 09:04

## 2019-06-23 RX ADMIN — PREDNISOLONE ACETATE 1 DROP: 10 SUSPENSION/ DROPS OPHTHALMIC at 07:06

## 2019-06-23 ASSESSMENT — PAIN SCALES - GENERAL
PAINLEVEL_OUTOF10: 3
PAINLEVEL_OUTOF10: 7
PAINLEVEL_OUTOF10: 8
PAINLEVEL_OUTOF10: 0
PAINLEVEL_OUTOF10: 8
PAINLEVEL_OUTOF10: 0
PAINLEVEL_OUTOF10: 3
PAINLEVEL_OUTOF10: 9
PAINLEVEL_OUTOF10: 8
PAINLEVEL_OUTOF10: 4
PAINLEVEL_OUTOF10: 8

## 2019-06-23 NOTE — PROGRESS NOTES
OT BEDSIDE TREATMENT NOTE      Date:2019  Patient Name: Wen Morley  MRN: [id number]  : 1950  Room: [address] [place] [address]     Per OT Eval:    Evaluating [facility], OTR/L #[id number]     AM-PAC Daily Activity Raw Score:   Recommended Adaptive Equipment: TBD      Diagnosis: Acute ischemic cerebrovascular accident (CVA) involving left middle cerebral artery territory [facility]) [I63.512]  Acute ischemic cerebrovascular accident (CVA) involving left middle cerebral artery territory [facility]) [I63.512]     Modified Mulhall Scale (MRS)  Score     Description  0             No symptoms  1             No significant disability despite symptoms  2             Slight disability; able to look after own affairs  3             Moderate disability; able to ambulate without assist/ requires assist with ADLs  4             Moderate/Severe disability;requires assist to ambulate/assist with ADLs  5             Severe disability;bedridden/incontinent   6               Score:   4  Pertinent Medical History: throat CA, COPD, R jaw repair     Precautions:  Falls, R UE flaccidity, nonverbal for majority of session, aphasic, khan, waffle cushion, Peg tube with abdominal Binder for 4 weeks     Home Living: Pt lives with spouse in 2 floor home. 3 PRERNA, 0 handrail  Bathroom setup: tub/shower  Equipment owned: tub bench, ww     Prior Level of Function: independent with ADLs , independent with IADLs; ambulated independently w/o AD  Driving: yes  Occupation: retired  Spouse provided PLOF/home setup information     Pain Level: pt reports abdominal pain since peg tube placement, per spouse. Reinforced pain management strategies throughout     Cognition: Alert & oriented grossly x 2,  Pleasant & cooperative, fatigued this date. Pt does nod head 'yes' or no. Follows 1 step directions. Pt can be verbal with simple words at times, aphasic. Encouraged pt to speak when possible with Fair understanding but minimal results.                Memory:

## 2019-06-24 ENCOUNTER — APPOINTMENT (OUTPATIENT)
Dept: GENERAL RADIOLOGY | Age: 69
DRG: 064 | End: 2019-06-24
Payer: MEDICARE

## 2019-06-24 LAB
ANION GAP SERPL CALCULATED.3IONS-SCNC: 8 MMOL/L (ref 7–16)
BUN BLDV-MCNC: 15 MG/DL (ref 8–23)
CALCIUM SERPL-MCNC: 9 MG/DL (ref 8.6–10.2)
CHLORIDE BLD-SCNC: 102 MMOL/L (ref 98–107)
CO2: 29 MMOL/L (ref 22–29)
CREAT SERPL-MCNC: 0.6 MG/DL (ref 0.7–1.2)
GFR AFRICAN AMERICAN: >60
GFR NON-AFRICAN AMERICAN: >60 ML/MIN/1.73
GLUCOSE BLD-MCNC: 135 MG/DL (ref 74–99)
HCT VFR BLD CALC: 40.9 % (ref 37–54)
HEMOGLOBIN: 13.1 G/DL (ref 12.5–16.5)
MAGNESIUM: 2.1 MG/DL (ref 1.6–2.6)
MCH RBC QN AUTO: 31.2 PG (ref 26–35)
MCHC RBC AUTO-ENTMCNC: 32 % (ref 32–34.5)
MCV RBC AUTO: 97.4 FL (ref 80–99.9)
PDW BLD-RTO: 14.6 FL (ref 11.5–15)
PHOSPHORUS: 2.9 MG/DL (ref 2.5–4.5)
PLATELET # BLD: 226 E9/L (ref 130–450)
PMV BLD AUTO: 11 FL (ref 7–12)
POTASSIUM SERPL-SCNC: 3.7 MMOL/L (ref 3.5–5)
RBC # BLD: 4.2 E12/L (ref 3.8–5.8)
REASON FOR REJECTION: NORMAL
REJECTED TEST: NORMAL
SODIUM BLD-SCNC: 139 MMOL/L (ref 132–146)
WBC # BLD: 12.7 E9/L (ref 4.5–11.5)

## 2019-06-24 PROCEDURE — 92507 TX SP LANG VOICE COMM INDIV: CPT

## 2019-06-24 PROCEDURE — 94640 AIRWAY INHALATION TREATMENT: CPT

## 2019-06-24 PROCEDURE — 36415 COLL VENOUS BLD VENIPUNCTURE: CPT

## 2019-06-24 PROCEDURE — 2700000000 HC OXYGEN THERAPY PER DAY

## 2019-06-24 PROCEDURE — 83735 ASSAY OF MAGNESIUM: CPT

## 2019-06-24 PROCEDURE — 6370000000 HC RX 637 (ALT 250 FOR IP): Performed by: PSYCHIATRY & NEUROLOGY

## 2019-06-24 PROCEDURE — 80048 BASIC METABOLIC PNL TOTAL CA: CPT

## 2019-06-24 PROCEDURE — 6370000000 HC RX 637 (ALT 250 FOR IP): Performed by: NURSE PRACTITIONER

## 2019-06-24 PROCEDURE — 84100 ASSAY OF PHOSPHORUS: CPT

## 2019-06-24 PROCEDURE — 6370000000 HC RX 637 (ALT 250 FOR IP): Performed by: INTERNAL MEDICINE

## 2019-06-24 PROCEDURE — 71045 X-RAY EXAM CHEST 1 VIEW: CPT

## 2019-06-24 PROCEDURE — 2580000003 HC RX 258: Performed by: HOSPITALIST

## 2019-06-24 PROCEDURE — 6360000002 HC RX W HCPCS: Performed by: INTERNAL MEDICINE

## 2019-06-24 PROCEDURE — 2580000003 HC RX 258: Performed by: INTERNAL MEDICINE

## 2019-06-24 PROCEDURE — 99233 SBSQ HOSP IP/OBS HIGH 50: CPT | Performed by: INTERNAL MEDICINE

## 2019-06-24 PROCEDURE — 6370000000 HC RX 637 (ALT 250 FOR IP): Performed by: HOSPITALIST

## 2019-06-24 PROCEDURE — 6360000002 HC RX W HCPCS: Performed by: HOSPITALIST

## 2019-06-24 PROCEDURE — 94664 DEMO&/EVAL PT USE INHALER: CPT

## 2019-06-24 PROCEDURE — 2060000000 HC ICU INTERMEDIATE R&B

## 2019-06-24 PROCEDURE — 85027 COMPLETE CBC AUTOMATED: CPT

## 2019-06-24 RX ORDER — BUDESONIDE 0.25 MG/2ML
0.25 INHALANT ORAL 2 TIMES DAILY
Status: DISCONTINUED | OUTPATIENT
Start: 2019-06-24 | End: 2019-06-28 | Stop reason: HOSPADM

## 2019-06-24 RX ORDER — IPRATROPIUM BROMIDE AND ALBUTEROL SULFATE 2.5; .5 MG/3ML; MG/3ML
1 SOLUTION RESPIRATORY (INHALATION) 4 TIMES DAILY
Status: DISCONTINUED | OUTPATIENT
Start: 2019-06-24 | End: 2019-06-28 | Stop reason: HOSPADM

## 2019-06-24 RX ORDER — FUROSEMIDE 10 MG/ML
20 INJECTION INTRAMUSCULAR; INTRAVENOUS 2 TIMES DAILY
Status: DISCONTINUED | OUTPATIENT
Start: 2019-06-24 | End: 2019-06-25

## 2019-06-24 RX ADMIN — HYDROMORPHONE HYDROCHLORIDE 0.2 MG: 1 INJECTION, SOLUTION INTRAMUSCULAR; INTRAVENOUS; SUBCUTANEOUS at 08:12

## 2019-06-24 RX ADMIN — IPRATROPIUM BROMIDE AND ALBUTEROL SULFATE 1 AMPULE: .5; 3 SOLUTION RESPIRATORY (INHALATION) at 22:39

## 2019-06-24 RX ADMIN — FUROSEMIDE 20 MG: 10 INJECTION, SOLUTION INTRAMUSCULAR; INTRAVENOUS at 22:45

## 2019-06-24 RX ADMIN — HYDROMORPHONE HYDROCHLORIDE 0.2 MG: 1 INJECTION, SOLUTION INTRAMUSCULAR; INTRAVENOUS; SUBCUTANEOUS at 04:47

## 2019-06-24 RX ADMIN — HYDROMORPHONE HYDROCHLORIDE 0.2 MG: 1 INJECTION, SOLUTION INTRAMUSCULAR; INTRAVENOUS; SUBCUTANEOUS at 12:44

## 2019-06-24 RX ADMIN — HYDROMORPHONE HYDROCHLORIDE 0.2 MG: 1 INJECTION, SOLUTION INTRAMUSCULAR; INTRAVENOUS; SUBCUTANEOUS at 10:26

## 2019-06-24 RX ADMIN — HYDROMORPHONE HYDROCHLORIDE 0.2 MG: 1 INJECTION, SOLUTION INTRAMUSCULAR; INTRAVENOUS; SUBCUTANEOUS at 00:20

## 2019-06-24 RX ADMIN — BUDESONIDE 250 MCG: 0.25 SUSPENSION RESPIRATORY (INHALATION) at 22:39

## 2019-06-24 RX ADMIN — CLOPIDOGREL 75 MG: 75 TABLET, FILM COATED ORAL at 08:12

## 2019-06-24 RX ADMIN — Medication 10 ML: at 22:45

## 2019-06-24 RX ADMIN — ASPIRIN 81 MG 81 MG: 81 TABLET ORAL at 08:12

## 2019-06-24 RX ADMIN — ATORVASTATIN CALCIUM 40 MG: 40 TABLET, FILM COATED ORAL at 20:55

## 2019-06-24 RX ADMIN — DEXTROSE AND SODIUM CHLORIDE: 5; 900 INJECTION, SOLUTION INTRAVENOUS at 17:17

## 2019-06-24 RX ADMIN — DEXTROSE AND SODIUM CHLORIDE: 5; 900 INJECTION, SOLUTION INTRAVENOUS at 08:09

## 2019-06-24 RX ADMIN — HYDROMORPHONE HYDROCHLORIDE 0.2 MG: 1 INJECTION, SOLUTION INTRAMUSCULAR; INTRAVENOUS; SUBCUTANEOUS at 15:10

## 2019-06-24 RX ADMIN — PREDNISOLONE ACETATE 1 DROP: 10 SUSPENSION/ DROPS OPHTHALMIC at 17:17

## 2019-06-24 RX ADMIN — HYDROMORPHONE HYDROCHLORIDE 0.2 MG: 1 INJECTION, SOLUTION INTRAMUSCULAR; INTRAVENOUS; SUBCUTANEOUS at 18:58

## 2019-06-24 RX ADMIN — PREDNISOLONE ACETATE 1 DROP: 10 SUSPENSION/ DROPS OPHTHALMIC at 12:44

## 2019-06-24 RX ADMIN — PREDNISOLONE ACETATE 1 DROP: 10 SUSPENSION/ DROPS OPHTHALMIC at 05:28

## 2019-06-24 RX ADMIN — HYDROMORPHONE HYDROCHLORIDE 0.2 MG: 1 INJECTION, SOLUTION INTRAMUSCULAR; INTRAVENOUS; SUBCUTANEOUS at 17:12

## 2019-06-24 RX ADMIN — PREDNISOLONE ACETATE 1 DROP: 10 SUSPENSION/ DROPS OPHTHALMIC at 00:05

## 2019-06-24 RX ADMIN — PIPERACILLIN SODIUM,TAZOBACTAM SODIUM 3.38 G: 3; .375 INJECTION, POWDER, FOR SOLUTION INTRAVENOUS at 22:45

## 2019-06-24 ASSESSMENT — PAIN SCALES - GENERAL
PAINLEVEL_OUTOF10: 8
PAINLEVEL_OUTOF10: 9
PAINLEVEL_OUTOF10: 4
PAINLEVEL_OUTOF10: 7
PAINLEVEL_OUTOF10: 8
PAINLEVEL_OUTOF10: 8
PAINLEVEL_OUTOF10: 7
PAINLEVEL_OUTOF10: 9
PAINLEVEL_OUTOF10: 5
PAINLEVEL_OUTOF10: 4
PAINLEVEL_OUTOF10: 4
PAINLEVEL_OUTOF10: 8
PAINLEVEL_OUTOF10: 4

## 2019-06-24 NOTE — CONSULTS
Non-medical: Not on file   Tobacco Use    Smoking status: Former Smoker     Packs/day: 1.50     Years: 40.00     Pack years: 60.00     Last attempt to quit: 1/15/2010     Years since quittin.4    Smokeless tobacco: Never Used   Substance and Sexual Activity    Alcohol use: No     Comment: occasional    Drug use: No    Sexual activity: Not on file     Comment: N/A   Lifestyle    Physical activity:     Days per week: Not on file     Minutes per session: Not on file    Stress: Not on file   Relationships    Social connections:     Talks on phone: Not on file     Gets together: Not on file     Attends [employer] service: Not on file     Active member of club or organization: Not on file     Attends meetings of clubs or organizations: Not on file     Relationship status: Not on file    Intimate partner violence:     Fear of current or ex partner: Not on file     Emotionally abused: Not on file     Physically abused: Not on file     Forced sexual activity: Not on file   Other Topics Concern    Not on file   Social History Narrative    Not on file     Social History     Tobacco Use   Smoking Status Former Smoker    Packs/day: 1.50    Years: 40.00    Pack years: 60.00    Last attempt to quit: 1/15/2010    Years since quittin.4   Smokeless Tobacco Never Used     Social History     Substance and Sexual Activity   Alcohol Use No    Comment: occasional     Social History     Substance and Sexual Activity   Drug Use No                 HOME MEDICATIONS:  Prior to Admission medications    Medication Sig Start Date End Date Taking?  Authorizing Provider   prednisoLONE acetate (PRED FORTE) 1 % ophthalmic suspension Place 1 drop into the right eye 4 times daily   Yes Historical Provider, MD       CURRENT MEDICATIONS:  Current Facility-Administered Medications: HYDROmorphone (DILAUDID) injection 0.2 mg, 0.2 mg, Intravenous, Q2H PRN  clopidogrel (PLAVIX) tablet 75 mg, 75 mg, Oral, Daily  bisacodyl (DULCOLAX) suppository 10 mg, 10 mg, Rectal, Daily PRN  sodium chloride flush 0.9 % injection 10 mL, 10 mL, Intravenous, 2 times per day  sodium chloride flush 0.9 % injection 10 mL, 10 mL, Intravenous, PRN  magnesium hydroxide (MILK OF MAGNESIA) 400 MG/5ML suspension 30 mL, 30 mL, Oral, Daily PRN  ondansetron (ZOFRAN) injection 4 mg, 4 mg, Intravenous, Q6H PRN  atorvastatin (LIPITOR) tablet 40 mg, 40 mg, Oral, Nightly  dextrose 5 % and 0.9 % sodium chloride infusion, , Intravenous, Continuous  aspirin chewable tablet 81 mg, 81 mg, Oral, Daily  prednisoLONE acetate (PRED FORTE) 1 % ophthalmic suspension 1 drop, 1 drop, Right Eye, 4 times per day  Facility-Administered Medications Ordered in Other Encounters: filgrastim (NEUPOGEN) injection 300 mcg, 300 mcg, Subcutaneous, Once    IV MEDICATIONS:   dextrose 5 % and 0.9 % NaCl 100 mL/hr at 06/23/19 0103       ALLERGIES:  No Known Allergies    REVIEW OF SYSTEMS:  General ROS:  No weight loss ,no fatigue     ENT ROS:   No Sore throat ,no lymphoadenopathy,no nasal stuffiness     Hematological and Lymphatic ROS:   No ecchymosis ,no tendency to bleed  Respiratory ROS:   ? Hemoptysis   Cardiovascular ROS:   No CP,No Palpitation   Gastrointestinal ROS:   No Gi bleed,no nausea or vomiting      - Musculoskeletal ROS:      - no joint swelling ,no joint pain   Neurological ROS:     -no weakness or numbness    Dermatological ROS:   No skin rash ,no urticaria     PHYSICAL EXAMINATION:     VITAL SIGNS:  BP (!) 145/75   Pulse 82   Temp 98.6 °F (37 °C) (Axillary)   Resp 18   Ht 6' (1.829 m)   Wt 161 lb 4 oz (73.1 kg)   SpO2 96%   BMI 21.87 kg/m²   Wt Readings from Last 3 Encounters:   06/23/19 161 lb 4 oz (73.1 kg)   06/19/19 155 lb (70.3 kg)   12/21/18 155 lb (70.3 kg)     Temp Readings from Last 3 Encounters:   06/23/19 98.6 °F (37 °C) (Axillary)   06/19/19 96.3 °F (35.7 °C) (Core)   12/21/18 97.7 °F (36.5 °C) (Oral)     TMAX:  BP Readings from Last 3 Encounters:   06/23/19 (!) 145/75

## 2019-06-25 LAB
ANION GAP SERPL CALCULATED.3IONS-SCNC: 11 MMOL/L (ref 7–16)
BUN BLDV-MCNC: 18 MG/DL (ref 8–23)
CALCIUM SERPL-MCNC: 8.8 MG/DL (ref 8.6–10.2)
CHLORIDE BLD-SCNC: 99 MMOL/L (ref 98–107)
CO2: 26 MMOL/L (ref 22–29)
CREAT SERPL-MCNC: 0.7 MG/DL (ref 0.7–1.2)
GFR AFRICAN AMERICAN: >60
GFR NON-AFRICAN AMERICAN: >60 ML/MIN/1.73
GLUCOSE BLD-MCNC: 173 MG/DL (ref 74–99)
HCT VFR BLD CALC: 36.9 % (ref 37–54)
HEMOGLOBIN: 12.4 G/DL (ref 12.5–16.5)
MAGNESIUM: 2 MG/DL (ref 1.6–2.6)
MCH RBC QN AUTO: 31.6 PG (ref 26–35)
MCHC RBC AUTO-ENTMCNC: 33.6 % (ref 32–34.5)
MCV RBC AUTO: 94.1 FL (ref 80–99.9)
METER GLUCOSE: 160 MG/DL (ref 74–99)
PDW BLD-RTO: 14.6 FL (ref 11.5–15)
PHOSPHORUS: 2.8 MG/DL (ref 2.5–4.5)
PLATELET # BLD: 217 E9/L (ref 130–450)
PMV BLD AUTO: 11.2 FL (ref 7–12)
POTASSIUM SERPL-SCNC: 3.5 MMOL/L (ref 3.5–5)
PRO-BNP: 186 PG/ML (ref 0–125)
PROCALCITONIN: 0.09 NG/ML (ref 0–0.08)
RBC # BLD: 3.92 E12/L (ref 3.8–5.8)
SODIUM BLD-SCNC: 136 MMOL/L (ref 132–146)
WBC # BLD: 12.9 E9/L (ref 4.5–11.5)

## 2019-06-25 PROCEDURE — 85027 COMPLETE CBC AUTOMATED: CPT

## 2019-06-25 PROCEDURE — 2060000000 HC ICU INTERMEDIATE R&B

## 2019-06-25 PROCEDURE — 2580000003 HC RX 258: Performed by: HOSPITALIST

## 2019-06-25 PROCEDURE — 6370000000 HC RX 637 (ALT 250 FOR IP): Performed by: INTERNAL MEDICINE

## 2019-06-25 PROCEDURE — 97112 NEUROMUSCULAR REEDUCATION: CPT

## 2019-06-25 PROCEDURE — 84100 ASSAY OF PHOSPHORUS: CPT

## 2019-06-25 PROCEDURE — 6370000000 HC RX 637 (ALT 250 FOR IP): Performed by: NURSE PRACTITIONER

## 2019-06-25 PROCEDURE — 36415 COLL VENOUS BLD VENIPUNCTURE: CPT

## 2019-06-25 PROCEDURE — 97530 THERAPEUTIC ACTIVITIES: CPT

## 2019-06-25 PROCEDURE — 6360000002 HC RX W HCPCS: Performed by: INTERNAL MEDICINE

## 2019-06-25 PROCEDURE — 80048 BASIC METABOLIC PNL TOTAL CA: CPT

## 2019-06-25 PROCEDURE — 92507 TX SP LANG VOICE COMM INDIV: CPT

## 2019-06-25 PROCEDURE — 2580000003 HC RX 258: Performed by: INTERNAL MEDICINE

## 2019-06-25 PROCEDURE — 83880 ASSAY OF NATRIURETIC PEPTIDE: CPT

## 2019-06-25 PROCEDURE — 6370000000 HC RX 637 (ALT 250 FOR IP): Performed by: HOSPITALIST

## 2019-06-25 PROCEDURE — 83735 ASSAY OF MAGNESIUM: CPT

## 2019-06-25 PROCEDURE — 84145 PROCALCITONIN (PCT): CPT

## 2019-06-25 PROCEDURE — 97535 SELF CARE MNGMENT TRAINING: CPT

## 2019-06-25 PROCEDURE — 6370000000 HC RX 637 (ALT 250 FOR IP): Performed by: PSYCHIATRY & NEUROLOGY

## 2019-06-25 PROCEDURE — 82962 GLUCOSE BLOOD TEST: CPT

## 2019-06-25 PROCEDURE — 97110 THERAPEUTIC EXERCISES: CPT

## 2019-06-25 PROCEDURE — 94640 AIRWAY INHALATION TREATMENT: CPT

## 2019-06-25 PROCEDURE — 2700000000 HC OXYGEN THERAPY PER DAY

## 2019-06-25 RX ADMIN — PIPERACILLIN SODIUM,TAZOBACTAM SODIUM 3.38 G: 3; .375 INJECTION, POWDER, FOR SOLUTION INTRAVENOUS at 06:46

## 2019-06-25 RX ADMIN — IPRATROPIUM BROMIDE AND ALBUTEROL SULFATE 1 AMPULE: .5; 3 SOLUTION RESPIRATORY (INHALATION) at 21:16

## 2019-06-25 RX ADMIN — ASPIRIN 81 MG 81 MG: 81 TABLET ORAL at 09:11

## 2019-06-25 RX ADMIN — BUDESONIDE 250 MCG: 0.25 SUSPENSION RESPIRATORY (INHALATION) at 10:46

## 2019-06-25 RX ADMIN — ATORVASTATIN CALCIUM 40 MG: 40 TABLET, FILM COATED ORAL at 21:23

## 2019-06-25 RX ADMIN — PIPERACILLIN SODIUM,TAZOBACTAM SODIUM 3.38 G: 3; .375 INJECTION, POWDER, FOR SOLUTION INTRAVENOUS at 21:23

## 2019-06-25 RX ADMIN — IPRATROPIUM BROMIDE AND ALBUTEROL SULFATE 1 AMPULE: .5; 3 SOLUTION RESPIRATORY (INHALATION) at 17:42

## 2019-06-25 RX ADMIN — FUROSEMIDE 20 MG: 10 INJECTION, SOLUTION INTRAMUSCULAR; INTRAVENOUS at 09:11

## 2019-06-25 RX ADMIN — Medication 10 ML: at 06:46

## 2019-06-25 RX ADMIN — Medication 10 ML: at 09:13

## 2019-06-25 RX ADMIN — CLOPIDOGREL 75 MG: 75 TABLET, FILM COATED ORAL at 09:11

## 2019-06-25 RX ADMIN — PIPERACILLIN SODIUM,TAZOBACTAM SODIUM 3.38 G: 3; .375 INJECTION, POWDER, FOR SOLUTION INTRAVENOUS at 14:31

## 2019-06-25 RX ADMIN — IPRATROPIUM BROMIDE AND ALBUTEROL SULFATE 1 AMPULE: .5; 3 SOLUTION RESPIRATORY (INHALATION) at 14:11

## 2019-06-25 RX ADMIN — BUDESONIDE 250 MCG: 0.25 SUSPENSION RESPIRATORY (INHALATION) at 21:18

## 2019-06-25 RX ADMIN — Medication 10 ML: at 21:23

## 2019-06-25 RX ADMIN — IPRATROPIUM BROMIDE AND ALBUTEROL SULFATE 1 AMPULE: .5; 3 SOLUTION RESPIRATORY (INHALATION) at 10:45

## 2019-06-25 ASSESSMENT — PAIN SCALES - GENERAL
PAINLEVEL_OUTOF10: 0

## 2019-06-25 ASSESSMENT — PAIN SCALES - WONG BAKER: WONGBAKER_NUMERICALRESPONSE: 2

## 2019-06-25 NOTE — PROGRESS NOTES
Notified Dr. Loyd Wang patient now requiring 15LO2 non-rebreather, lung fields with rhonchi and crackles. Patient calm and in no distress.  Respiratory rate 22-24  Pulse ox 88% /77

## 2019-06-25 NOTE — PROGRESS NOTES
Code    +++++++++++++++++++++++++++++++++++++++++++++++++  García Tovar MD   [facility].  +++++++++++++++++++++++++++++++++++++++++++++++++  NOTE: This report was transcribed using voice recognition software. Every effort was made to ensure accuracy; however, inadvertent computerized transcription errors may be present.

## 2019-06-25 NOTE — CONSULTS
Pulmonary [facility]                             Pulmonary Consult/Progress Note :          Patient: Shayy Valdovinos  MRN: [id number]  : 1950      Date of Admission: .2019  8:58 AM    Consulting Physician:        Reason for Consultation:  CC : sob ,hemoptysis ,    HPI:   Patient later on developed SOB and hypoxia where he required   CXR showing new infiltrate   I saw him this evening and he was breathing very well   No fever or chills ,no chest pain  CTA reviewed ,no PE ,      PHYSICAL EXAMINATION:     VITAL SIGNS:  BP (!) 165/77   Pulse 105   Temp 97.9 °F (36.6 °C) (Temporal)   Resp 22   Ht 6' (1.829 m)   Wt 164 lb 6 oz (74.6 kg)   SpO2 91%   BMI 22.29 kg/m²   Wt Readings from Last 3 Encounters:   19 164 lb 6 oz (74.6 kg)   19 155 lb (70.3 kg)   18 155 lb (70.3 kg)     Temp Readings from Last 3 Encounters:   19 97.9 °F (36.6 °C) (Temporal)   19 96.3 °F (35.7 °C) (Core)   18 97.7 °F (36.5 °C) (Oral)     TMAX:  BP Readings from Last 3 Encounters:   19 (!) 165/77   19 (!) 150/87   19 (!) 136/95     Pulse Readings from Last 3 Encounters:   19 105   19 80   18 88           INTAKE/OUTPUTS:  I/O last 3 completed shifts:   In: 1769.5 [I.V.:1286.5; NG/GT:483]  Out: 1600 [Urine:1600]    Intake/Output Summary (Last 24 hours) at 2019  Last data filed at 2019  Gross per 24 hour   Intake 4104.45 ml   Output 1150 ml   Net 2954.45 ml       General Appearance: alert and oriented to person, place and time, well-developed and   well-nourished, in no acute distress   Eyes: pupils equal, round, and reactive to light, extraocular eye movements intact, conjunctivae normal and sclera anicteric   Neck: neck supple and non tender without mass, no thyromegaly, no thyroid nodules and no cervical adenopathy   Pulmonary/Chest:scattred rhonci   Cardiovascular: normal rate, regular rhythm,

## 2019-06-26 LAB
ANION GAP SERPL CALCULATED.3IONS-SCNC: 15 MMOL/L (ref 7–16)
BUN BLDV-MCNC: 30 MG/DL (ref 8–23)
CALCIUM SERPL-MCNC: 9.9 MG/DL (ref 8.6–10.2)
CHLORIDE BLD-SCNC: 98 MMOL/L (ref 98–107)
CO2: 27 MMOL/L (ref 22–29)
CREAT SERPL-MCNC: 0.8 MG/DL (ref 0.7–1.2)
CULTURE, RESPIRATORY: ABNORMAL
GFR AFRICAN AMERICAN: >60
GFR NON-AFRICAN AMERICAN: >60 ML/MIN/1.73
GLUCOSE BLD-MCNC: 127 MG/DL (ref 74–99)
HCT VFR BLD CALC: 37.5 % (ref 37–54)
HEMOGLOBIN: 12.4 G/DL (ref 12.5–16.5)
MAGNESIUM: 2.4 MG/DL (ref 1.6–2.6)
MCH RBC QN AUTO: 31.7 PG (ref 26–35)
MCHC RBC AUTO-ENTMCNC: 33.1 % (ref 32–34.5)
MCV RBC AUTO: 95.9 FL (ref 80–99.9)
ORGANISM: ABNORMAL
ORGANISM: ABNORMAL
PDW BLD-RTO: 14.6 FL (ref 11.5–15)
PHOSPHORUS: 3.2 MG/DL (ref 2.5–4.5)
PLATELET # BLD: 219 E9/L (ref 130–450)
PMV BLD AUTO: 11.7 FL (ref 7–12)
POTASSIUM SERPL-SCNC: 3.7 MMOL/L (ref 3.5–5)
RBC # BLD: 3.91 E12/L (ref 3.8–5.8)
SMEAR, RESPIRATORY: ABNORMAL
SODIUM BLD-SCNC: 140 MMOL/L (ref 132–146)
WBC # BLD: 15.2 E9/L (ref 4.5–11.5)

## 2019-06-26 PROCEDURE — 2060000000 HC ICU INTERMEDIATE R&B

## 2019-06-26 PROCEDURE — 6360000002 HC RX W HCPCS: Performed by: INTERNAL MEDICINE

## 2019-06-26 PROCEDURE — 92507 TX SP LANG VOICE COMM INDIV: CPT

## 2019-06-26 PROCEDURE — 6370000000 HC RX 637 (ALT 250 FOR IP): Performed by: PSYCHIATRY & NEUROLOGY

## 2019-06-26 PROCEDURE — 84100 ASSAY OF PHOSPHORUS: CPT

## 2019-06-26 PROCEDURE — 6370000000 HC RX 637 (ALT 250 FOR IP): Performed by: NURSE PRACTITIONER

## 2019-06-26 PROCEDURE — 97110 THERAPEUTIC EXERCISES: CPT

## 2019-06-26 PROCEDURE — 94640 AIRWAY INHALATION TREATMENT: CPT

## 2019-06-26 PROCEDURE — 97530 THERAPEUTIC ACTIVITIES: CPT

## 2019-06-26 PROCEDURE — 2580000003 HC RX 258: Performed by: INTERNAL MEDICINE

## 2019-06-26 PROCEDURE — 6370000000 HC RX 637 (ALT 250 FOR IP): Performed by: INTERNAL MEDICINE

## 2019-06-26 PROCEDURE — 94760 N-INVAS EAR/PLS OXIMETRY 1: CPT

## 2019-06-26 PROCEDURE — 85027 COMPLETE CBC AUTOMATED: CPT

## 2019-06-26 PROCEDURE — 2700000000 HC OXYGEN THERAPY PER DAY

## 2019-06-26 PROCEDURE — 36415 COLL VENOUS BLD VENIPUNCTURE: CPT

## 2019-06-26 PROCEDURE — 94669 MECHANICAL CHEST WALL OSCILL: CPT

## 2019-06-26 PROCEDURE — 80048 BASIC METABOLIC PNL TOTAL CA: CPT

## 2019-06-26 PROCEDURE — 6370000000 HC RX 637 (ALT 250 FOR IP): Performed by: HOSPITALIST

## 2019-06-26 PROCEDURE — 97535 SELF CARE MNGMENT TRAINING: CPT

## 2019-06-26 PROCEDURE — 2580000003 HC RX 258: Performed by: HOSPITALIST

## 2019-06-26 PROCEDURE — 83735 ASSAY OF MAGNESIUM: CPT

## 2019-06-26 PROCEDURE — 94667 MNPJ CHEST WALL 1ST: CPT

## 2019-06-26 PROCEDURE — 99233 SBSQ HOSP IP/OBS HIGH 50: CPT | Performed by: INTERNAL MEDICINE

## 2019-06-26 RX ADMIN — PIPERACILLIN SODIUM,TAZOBACTAM SODIUM 3.38 G: 3; .375 INJECTION, POWDER, FOR SOLUTION INTRAVENOUS at 23:00

## 2019-06-26 RX ADMIN — IPRATROPIUM BROMIDE AND ALBUTEROL SULFATE 1 AMPULE: .5; 3 SOLUTION RESPIRATORY (INHALATION) at 21:22

## 2019-06-26 RX ADMIN — Medication 10 ML: at 09:09

## 2019-06-26 RX ADMIN — IPRATROPIUM BROMIDE AND ALBUTEROL SULFATE 1 AMPULE: .5; 3 SOLUTION RESPIRATORY (INHALATION) at 14:06

## 2019-06-26 RX ADMIN — BUDESONIDE 250 MCG: 0.25 SUSPENSION RESPIRATORY (INHALATION) at 21:22

## 2019-06-26 RX ADMIN — IPRATROPIUM BROMIDE AND ALBUTEROL SULFATE 1 AMPULE: .5; 3 SOLUTION RESPIRATORY (INHALATION) at 17:02

## 2019-06-26 RX ADMIN — ATORVASTATIN CALCIUM 40 MG: 40 TABLET, FILM COATED ORAL at 20:30

## 2019-06-26 RX ADMIN — PIPERACILLIN SODIUM,TAZOBACTAM SODIUM 3.38 G: 3; .375 INJECTION, POWDER, FOR SOLUTION INTRAVENOUS at 05:44

## 2019-06-26 RX ADMIN — CLOPIDOGREL 75 MG: 75 TABLET, FILM COATED ORAL at 09:09

## 2019-06-26 RX ADMIN — BUDESONIDE 250 MCG: 0.25 SUSPENSION RESPIRATORY (INHALATION) at 10:07

## 2019-06-26 RX ADMIN — IPRATROPIUM BROMIDE AND ALBUTEROL SULFATE 1 AMPULE: .5; 3 SOLUTION RESPIRATORY (INHALATION) at 10:06

## 2019-06-26 RX ADMIN — PIPERACILLIN SODIUM,TAZOBACTAM SODIUM 3.38 G: 3; .375 INJECTION, POWDER, FOR SOLUTION INTRAVENOUS at 15:57

## 2019-06-26 RX ADMIN — Medication 10 ML: at 20:30

## 2019-06-26 RX ADMIN — Medication 10 ML: at 23:00

## 2019-06-26 RX ADMIN — ASPIRIN 81 MG 81 MG: 81 TABLET ORAL at 09:09

## 2019-06-26 ASSESSMENT — PAIN SCALES - GENERAL: PAINLEVEL_OUTOF10: 0

## 2019-06-26 NOTE — PROGRESS NOTES
Pulmonary [facility] [facility]                             Pulmonary Consult/Progress Note :          Patient: Esperanza Mock  MRN: [id number]  : 1950      Date of Admission: .2019  8:58 AM    Consulting Physician:        Reason for Consultation:  CC : sob ,hemoptysis ,    HPI:   Had episodes of hypoxia  Doing fair with spirometry  Wife at bedside  No chest   Poor efforts     PHYSICAL EXAMINATION:     VITAL SIGNS:  /62   Pulse 90   Temp 97.4 °F (36.3 °C) (Temporal)   Resp 18   Ht 6' (1.829 m)   Wt 164 lb 1.6 oz (74.4 kg)   SpO2 98%   BMI 22.26 kg/m²   Wt Readings from Last 3 Encounters:   19 164 lb 1.6 oz (74.4 kg)   19 155 lb (70.3 kg)   18 155 lb (70.3 kg)     Temp Readings from Last 3 Encounters:   19 97.4 °F (36.3 °C) (Temporal)   19 96.3 °F (35.7 °C) (Core)   18 97.7 °F (36.5 °C) (Oral)     TMAX:  BP Readings from Last 3 Encounters:   19 118/62   19 (!) 150/87   19 (!) 136/95     Pulse Readings from Last 3 Encounters:   19 90   19 80   18 88           INTAKE/OUTPUTS:  I/O last 3 completed shifts:   In: 635 [I.V.:10; NG/GT:625]  Out: 1975 [Urine:1975]    Intake/Output Summary (Last 24 hours) at 2019 0037  Last data filed at 2019 1511  Gross per 24 hour   Intake 575 ml   Output 1875 ml   Net -1300 ml       General Appearance: alert and oriented to person, place and time, well-developed and   well-nourished, in no acute distress   Eyes: pupils equal, round, and reactive to light, extraocular eye movements intact, conjunctivae normal and sclera anicteric   Neck: neck supple and non tender without mass, no thyromegaly, no thyroid nodules and no cervical adenopathy   Pulmonary/Chest:scattred rhonci   Cardiovascular: normal rate, regular rhythm, normal S1 and S2, no murmurs, rubs, clicks or gallops, distal pulses intact, no carotid bruits, no murmurs, no gallops, no carotid

## 2019-06-26 NOTE — PROGRESS NOTES
OT BEDSIDE TREATMENT NOTE      Date:2019  Patient Name: Carlos Huntley  MRN: [id number]  : 1950  Room: 8510/8510-A     Per OT Eval:    Evaluating [facility], OTR/L #[id number]     AM-PAC Daily Activity Raw Score:   Recommended Adaptive Equipment: TBD      Diagnosis: Acute ischemic cerebrovascular accident (CVA) involving left middle cerebral artery territory [facility]) [I63.512]  Acute ischemic cerebrovascular accident (CVA) involving left middle cerebral artery territory [facility]) [I63.512]     Modified Leonel Scale (MRS)  Score     Description  0             No symptoms  1             No significant disability despite symptoms  2             Slight disability; able to look after own affairs  3             Moderate disability; able to ambulate without assist/ requires assist with ADLs  4             Moderate/Severe disability;requires assist to ambulate/assist with ADLs  5             Severe disability;bedridden/incontinent   6               Score:   4  Pertinent Medical History: throat CA, COPD, R jaw repair     Precautions:  Falls, R UE flaccidity, nonverbal this session, aphasic, khan, waffle cushion, Peg tube with abdominal Binder for 4 weeks, O2 increased 7L      Home Living: Pt lives with spouse in 2 floor home. 3 PRERNA, 0 handrail  Bathroom setup: tub/shower  Equipment owned: tub bench, ww     Prior Level of Function: independent with ADLs , independent with IADLs; ambulated independently w/o AD  Driving: yes  Occupation: retired  Spouse provided PLOF/home setup information     Pain Level: Pt reports no pain this session.   Cognition: Alert & oriented grossly x 2,  Pleasant & cooperative, fatigued this date. Pt does nod head 'yes' or no. Follows 1 step directions. Pt can be verbal with simple words at times, aphasic. Encouraged pt to speak when possible with Fair understanding but minimal results.                Memory: Fair- appears to be STM deficits              Sequencing:  fair - [facility]    Delay noted with R eye when tracking towards the left, R neglect noted        Education:  Pt & spouse was educated through out treatment regarding proper technique & safety with bed mobility, functional transfers, educated again on importance of positioning R UE due to flaccidity, ADL compensatory strategies & mellisa dressing techniques to ease tasks to improve safety. Completed RUE PROM all planes to maintain joint mobility. Trace shoulder shrug at start of session, unable to actively complete following ambulation due to fatigue. Comments: Upon arrival pt was in bed, spouse present & agreeable for therapy. At end of session pt was up in the chair, waffle cushion present all lines and tubes intact, call light within reach & spouse still present. · Pt has made slow progress towards set goals.    · Continue with current plan of care    Time in: 3:10  Time out: 3:55  Total Tx Time: 45 minutes    Alexia Ramirez

## 2019-06-27 LAB
ANION GAP SERPL CALCULATED.3IONS-SCNC: 11 MMOL/L (ref 7–16)
BUN BLDV-MCNC: 30 MG/DL (ref 8–23)
CALCIUM SERPL-MCNC: 9.4 MG/DL (ref 8.6–10.2)
CHLORIDE BLD-SCNC: 101 MMOL/L (ref 98–107)
CO2: 31 MMOL/L (ref 22–29)
CREAT SERPL-MCNC: 0.6 MG/DL (ref 0.7–1.2)
GFR AFRICAN AMERICAN: >60
GFR NON-AFRICAN AMERICAN: >60 ML/MIN/1.73
GLUCOSE BLD-MCNC: 129 MG/DL (ref 74–99)
HCT VFR BLD CALC: 34.3 % (ref 37–54)
HEMOGLOBIN: 11.2 G/DL (ref 12.5–16.5)
MAGNESIUM: 2.2 MG/DL (ref 1.6–2.6)
MCH RBC QN AUTO: 31.5 PG (ref 26–35)
MCHC RBC AUTO-ENTMCNC: 32.7 % (ref 32–34.5)
MCV RBC AUTO: 96.6 FL (ref 80–99.9)
PDW BLD-RTO: 14.6 FL (ref 11.5–15)
PHOSPHORUS: 2.5 MG/DL (ref 2.5–4.5)
PLATELET # BLD: 209 E9/L (ref 130–450)
PMV BLD AUTO: 11.3 FL (ref 7–12)
POTASSIUM SERPL-SCNC: 3.5 MMOL/L (ref 3.5–5)
RBC # BLD: 3.55 E12/L (ref 3.8–5.8)
SODIUM BLD-SCNC: 143 MMOL/L (ref 132–146)
WBC # BLD: 12.3 E9/L (ref 4.5–11.5)

## 2019-06-27 PROCEDURE — 2580000003 HC RX 258: Performed by: HOSPITALIST

## 2019-06-27 PROCEDURE — 94640 AIRWAY INHALATION TREATMENT: CPT

## 2019-06-27 PROCEDURE — 6370000000 HC RX 637 (ALT 250 FOR IP): Performed by: INTERNAL MEDICINE

## 2019-06-27 PROCEDURE — 6360000002 HC RX W HCPCS: Performed by: INTERNAL MEDICINE

## 2019-06-27 PROCEDURE — 94669 MECHANICAL CHEST WALL OSCILL: CPT

## 2019-06-27 PROCEDURE — 85027 COMPLETE CBC AUTOMATED: CPT

## 2019-06-27 PROCEDURE — 6370000000 HC RX 637 (ALT 250 FOR IP): Performed by: HOSPITALIST

## 2019-06-27 PROCEDURE — 84100 ASSAY OF PHOSPHORUS: CPT

## 2019-06-27 PROCEDURE — 99232 SBSQ HOSP IP/OBS MODERATE 35: CPT | Performed by: INTERNAL MEDICINE

## 2019-06-27 PROCEDURE — 36415 COLL VENOUS BLD VENIPUNCTURE: CPT

## 2019-06-27 PROCEDURE — 2700000000 HC OXYGEN THERAPY PER DAY

## 2019-06-27 PROCEDURE — 6370000000 HC RX 637 (ALT 250 FOR IP): Performed by: NURSE PRACTITIONER

## 2019-06-27 PROCEDURE — 80048 BASIC METABOLIC PNL TOTAL CA: CPT

## 2019-06-27 PROCEDURE — 2060000000 HC ICU INTERMEDIATE R&B

## 2019-06-27 PROCEDURE — 2580000003 HC RX 258: Performed by: INTERNAL MEDICINE

## 2019-06-27 PROCEDURE — 94150 VITAL CAPACITY TEST: CPT

## 2019-06-27 PROCEDURE — 83735 ASSAY OF MAGNESIUM: CPT

## 2019-06-27 PROCEDURE — 97112 NEUROMUSCULAR REEDUCATION: CPT

## 2019-06-27 PROCEDURE — 97535 SELF CARE MNGMENT TRAINING: CPT

## 2019-06-27 PROCEDURE — 6370000000 HC RX 637 (ALT 250 FOR IP): Performed by: PSYCHIATRY & NEUROLOGY

## 2019-06-27 PROCEDURE — 97530 THERAPEUTIC ACTIVITIES: CPT

## 2019-06-27 PROCEDURE — 97110 THERAPEUTIC EXERCISES: CPT

## 2019-06-27 RX ORDER — ACETAMINOPHEN 325 MG/1
650 TABLET ORAL EVERY 4 HOURS PRN
Status: DISCONTINUED | OUTPATIENT
Start: 2019-06-27 | End: 2019-06-28 | Stop reason: HOSPADM

## 2019-06-27 RX ADMIN — ATORVASTATIN CALCIUM 40 MG: 40 TABLET, FILM COATED ORAL at 22:12

## 2019-06-27 RX ADMIN — Medication 10 ML: at 21:10

## 2019-06-27 RX ADMIN — ASPIRIN 81 MG 81 MG: 81 TABLET ORAL at 09:12

## 2019-06-27 RX ADMIN — IPRATROPIUM BROMIDE AND ALBUTEROL SULFATE 1 AMPULE: .5; 3 SOLUTION RESPIRATORY (INHALATION) at 10:11

## 2019-06-27 RX ADMIN — Medication 10 ML: at 05:56

## 2019-06-27 RX ADMIN — CLOPIDOGREL 75 MG: 75 TABLET, FILM COATED ORAL at 09:12

## 2019-06-27 RX ADMIN — PIPERACILLIN SODIUM,TAZOBACTAM SODIUM 3.38 G: 3; .375 INJECTION, POWDER, FOR SOLUTION INTRAVENOUS at 05:56

## 2019-06-27 RX ADMIN — Medication 10 ML: at 09:12

## 2019-06-27 RX ADMIN — ACETAMINOPHEN 650 MG: 325 TABLET, FILM COATED ORAL at 23:46

## 2019-06-27 RX ADMIN — PIPERACILLIN SODIUM,TAZOBACTAM SODIUM 3.38 G: 3; .375 INJECTION, POWDER, FOR SOLUTION INTRAVENOUS at 22:12

## 2019-06-27 RX ADMIN — PIPERACILLIN SODIUM,TAZOBACTAM SODIUM 3.38 G: 3; .375 INJECTION, POWDER, FOR SOLUTION INTRAVENOUS at 15:44

## 2019-06-27 RX ADMIN — BUDESONIDE 250 MCG: 0.25 SUSPENSION RESPIRATORY (INHALATION) at 10:13

## 2019-06-27 RX ADMIN — BUDESONIDE 250 MCG: 0.25 SUSPENSION RESPIRATORY (INHALATION) at 20:02

## 2019-06-27 RX ADMIN — IPRATROPIUM BROMIDE AND ALBUTEROL SULFATE 1 AMPULE: .5; 3 SOLUTION RESPIRATORY (INHALATION) at 12:57

## 2019-06-27 RX ADMIN — IPRATROPIUM BROMIDE AND ALBUTEROL SULFATE 1 AMPULE: .5; 3 SOLUTION RESPIRATORY (INHALATION) at 20:02

## 2019-06-27 RX ADMIN — IPRATROPIUM BROMIDE AND ALBUTEROL SULFATE 1 AMPULE: .5; 3 SOLUTION RESPIRATORY (INHALATION) at 16:31

## 2019-06-27 ASSESSMENT — PAIN DESCRIPTION - DESCRIPTORS: DESCRIPTORS: ACHING;DISCOMFORT

## 2019-06-27 ASSESSMENT — PAIN DESCRIPTION - FREQUENCY: FREQUENCY: INTERMITTENT

## 2019-06-27 ASSESSMENT — PAIN DESCRIPTION - LOCATION: LOCATION: HEAD

## 2019-06-27 ASSESSMENT — PAIN SCALES - GENERAL
PAINLEVEL_OUTOF10: 0
PAINLEVEL_OUTOF10: 10
PAINLEVEL_OUTOF10: 0
PAINLEVEL_OUTOF10: 10
PAINLEVEL_OUTOF10: 0

## 2019-06-27 NOTE — PROGRESS NOTES
Pulmonary [address] [facility]                             Pulmonary Consult/Progress Note :          Patient: Acosta Sim  MRN: [id number]  : 1950      Date of Admission: .2019  8:58 AM    Consulting Physician:        Reason for Consultation:  CC : sob ,hemoptysis ,    HPI:   SOB has improved a lot   Hypoxia has improved  Doing 1 L spirometery    PHYSICAL EXAMINATION:     VITAL SIGNS:  BP (!) 104/56   Pulse 88   Temp 98.3 °F (36.8 °C) (Temporal)   Resp 22   Ht 6' (1.829 m)   Wt 156 lb 4 oz (70.9 kg)   SpO2 97%   BMI 21.19 kg/m²   Wt Readings from Last 3 Encounters:   19 156 lb 4 oz (70.9 kg)   19 155 lb (70.3 kg)   18 155 lb (70.3 kg)     Temp Readings from Last 3 Encounters:   19 98.3 °F (36.8 °C) (Temporal)   19 96.3 °F (35.7 °C) (Core)   18 97.7 °F (36.5 °C) (Oral)     TMAX:  BP Readings from Last 3 Encounters:   19 (!) 104/56   19 (!) 150/87   19 (!) 136/95     Pulse Readings from Last 3 Encounters:   19 88   19 80   18 88           INTAKE/OUTPUTS:  I/O last 3 completed shifts:   In: 100 [IV Piggyback:100]  Out: 350 [Urine:350]    Intake/Output Summary (Last 24 hours) at 2019 0057  Last data filed at 2019 1430  Gross per 24 hour   Intake 100 ml   Output 350 ml   Net -250 ml       General Appearance: alert and oriented to person, place and time, well-developed and   well-nourished, in no acute distress   Eyes: pupils equal, round, and reactive to light, extraocular eye movements intact, conjunctivae normal and sclera anicteric   Neck: neck supple and non tender without mass, no thyromegaly, no thyroid nodules and no cervical adenopathy   Pulmonary/Chest:scattred rhonci   Cardiovascular: normal rate, regular rhythm, normal S1 and S2, no murmurs, rubs, clicks or gallops, distal pulses intact, no carotid bruits, no murmurs, no gallops, no carotid bruits and no JVD   Abdomen: Problem List   Diagnosis    Esophageal cancer ([facility] [address]    Oral pharyngeal candidiasis    COPD (chronic obstructive pulmonary disease) (HCC)    Mucositis due to antineoplastic therapy    Thrush of mouth and esophagus (HCC)    Pancytopenia ([facility] [address]    Febrile neutropenia (HCC)    Acute ischemic cerebrovascular accident (CVA) involving left middle cerebral artery territory [facility])    Cerebrovascular accident (CVA) due to occlusion of middle cerebral artery (HCC)    Dysphagia, oropharyngeal    Moderate protein-calorie malnutrition (HCC)    Carotid artery stenosis, symptomatic, right               ASSESSMENT:  1.) Possible hilar adenopathy/mass/inflammtiion   2.)severe emphysema   3. )COPD  4. )stoke   5.)Atelectsis   6) hemoptysis       PLAN:  Hypoxia mostly related poor effort . ,weakness ,atelectsis   Aggressive  pulmonary toilet,encourge with wife IC and he did great  *- Zosyn ,cover for HAP and aspiration   *-BD   *_ CTA chest no PE ,will monitor giving his hypoxia  *-need chest vest soon and aggressive pulmonary toilet   *_need repeat CT in 2 months ,if finding large or persist will do bronch ebus,discuss with his wife     Up to chair  PT and OT  Start chest vest       Discuss with his nurse and his wife as well                 [facility]     NOTE: This report was transcribed using voice recognition software. Every effort was made to ensure accuracy; however, inadvertent computerized transcription errors may be present.

## 2019-06-27 NOTE — PROGRESS NOTES
34.3*    219 209       Recent Labs     06/25/19  0457 06/26/19  0526 06/27/19  0452    140 143   K 3.5 3.7 3.5   CL 99 98 101   CO2 26 27 31*   BUN 18 30* 30*   CREATININE 0.7 0.8 0.6*   CALCIUM 8.8 9.9 9.4   PHOS 2.8 3.2 2.5       No results for input(s): PROT, ALB, ALKPHOS, ALT, AST, BILITOT, AMYLASE, LIPASE in the last 72 hours. No results for input(s): INR in the last 72 hours. No results for input(s): [place] in the last 72 hours. Chronic labs:  Lab Results   Component Value Date    CHOL 146 06/20/2019    TRIG 66 06/20/2019    HDL 39 06/20/2019    LDLCALC 94 06/20/2019    TSH 0.977 04/06/2017    PSA 2.08 02/25/2015    INR 1.0 06/19/2019    LABA1C 5.9 (H) 06/20/2019       Radiology:  Imaging studies reviewed today. ASSESSMENT:    Active Problems:    Esophageal cancer (HCC)    COPD (chronic obstructive pulmonary disease) (HCC)    Acute ischemic cerebrovascular accident (CVA) involving left middle cerebral artery territory ([address]    Dysphagia, oropharyngeal    Moderate protein-calorie malnutrition (HCC)    Carotid artery stenosis, symptomatic, right  Resolved Problems:    * No resolved hospital problems. *  Severe emphysema  COPD  Atelectasis  Hemoptysis  Hilar LAD    PLAN:    Tolerating TF  Encouraged ANA [facility]  Chest vest, IV Zosyn - pulm following  Disposition planning for acute rehab at [facility] pending further improvement. Diet: DIET TUBE FEED CONTINUOUS/CYCLIC NPO; Diabetic 1.5; Gastrostomy; Continuous; 25; 50; 24  Code Status: Full Code    +++++++++++++++++++++++++++++++++++++++++++++++++  Oneil Zepeda MD   [facility].  +++++++++++++++++++++++++++++++++++++++++++++++++  NOTE: This report was transcribed using voice recognition software. Every effort was made to ensure accuracy; however, inadvertent computerized transcription errors may be present.

## 2019-06-27 NOTE — PROGRESS NOTES
OT BEDSIDE TREATMENT NOTE      Date:2019  Patient Name: Molly Adam  MRN: [id number]  : 1950  Room: 8510/85-A     Per OT Eval:    Evaluating [facility], OTR/L #[id number]     AM-PAC Daily Activity Raw Score:   Recommended Adaptive Equipment: TBD      Diagnosis: Acute ischemic cerebrovascular accident (CVA) involving left middle cerebral artery territory [facility]) [I63.512]  Acute ischemic cerebrovascular accident (CVA) involving left middle cerebral artery territory [facility]) [I63.512]     Modified Leonel Scale (MRS)  Score     Description  0             No symptoms  1             No significant disability despite symptoms  2             Slight disability; able to look after own affairs  3             Moderate disability; able to ambulate without assist/ requires assist with ADLs  4             Moderate/Severe disability;requires assist to ambulate/assist with ADLs  5             Severe disability;bedridden/incontinent   6               Score:   4  Pertinent Medical History: throat CA, COPD, R jaw repair     Precautions:  Falls, R UE flaccidity, nonverbal this session, aphasic, khan, waffle cushion, Peg tube with abdominal Binder for 4 weeks, O2 increased  5L      Home Living: Pt lives with spouse in 2 floor home. 3 PRERNA, 0 handrail  Bathroom setup: tub/shower  Equipment owned: tub bench, ww     Prior Level of Function: independent with ADLs , independent with IADLs; ambulated independently w/o AD  Driving: yes  Occupation: retired  Spouse provided PLOF/home setup information     Pain Level: Pt reports no pain this session.   Cognition: Alert & oriented grossly x 2,  Pleasant & cooperative. Follows 1 step directions. Pt can be verbal with simple words at times, aphasic. Encouraged pt to speak more instead of shaking his head yes or no with Fair results this date.                Memory: Fair- appears to be STM deficits              Sequencing:  fair -              Problem solving:  fair - Judgement/safety:  fair- (impulsive with movements)                Functional Assessment:    Initial Eval Status  Date: 6/20/19 Treatment Status  Date:  6/27/19 Short Term Goals  Treatment frequency: PRN    Feeding Minimal Assist     NPO  Peg tube in place Modified [place]    Grooming Moderate Assist  SBA  To comb hair using L UE with verbal cuing for posterior left side. Stand by Assist   UB Dressing Moderate Assist   Mod A  Continue to educate on mellisa dressing techniques, Poor recall regarding donning R UE first  Stand by Assist    LB Dressing Dependent  Mod-Max A  Donning boxer brief while supine in bed, instructed pt on mellisa technique, pt able to bridge hips up to hu brief over his waist, assist needed on R side  Instructed pt on 1 handed sock technique with pt able to hu R sock with Min A & increased time  Moderate Assist    Bathing Maximal Assist NT  Moderate Assist    Toileting Dependent  Min A  With urinal, catheter removed, wife assisting  Moderate Assist    Bed Mobility  Supine to sit: Moderate Assist   Sit to supine: NT Rolling: Mod-Max A onto L side   Mod- Supine to sit coming off L side  Educated on technique to increase independence. Rolling: Supervision   Supine to sit: Stand by Assist   Sit to supine: Stand by Assist    Functional Transfers Sit><stand: Min A x2  Education/cues for safety, body mechanics - emphasis on L UE/LE placement     Mod A  Sit to stand   cues for safety, technique & posture R LE guarded due to instability   (PTA present donned knee immobilizer for assist with stability) requested sling from ns for mobility    Min A   Functional Mobility Max A x2  Limited to steps to chair  LE buckling w/ steps to chair - safety reinforced Max A + Mod A   Short home distance using large base quad cane. Immobilizer on R LE, R LE guarded & supported through out, cuing for sequencing and weight shifting & advancement of cane. Mod A   Balance Sitting: Min A  Standing:  Max A x2

## 2019-06-28 VITALS
WEIGHT: 159 LBS | BODY MASS INDEX: 21.54 KG/M2 | RESPIRATION RATE: 20 BRPM | DIASTOLIC BLOOD PRESSURE: 78 MMHG | HEIGHT: 72 IN | OXYGEN SATURATION: 95 % | HEART RATE: 88 BPM | TEMPERATURE: 97.4 F | SYSTOLIC BLOOD PRESSURE: 140 MMHG

## 2019-06-28 LAB
ANION GAP SERPL CALCULATED.3IONS-SCNC: 13 MMOL/L (ref 7–16)
BUN BLDV-MCNC: 30 MG/DL (ref 8–23)
CALCIUM SERPL-MCNC: 9.6 MG/DL (ref 8.6–10.2)
CHLORIDE BLD-SCNC: 106 MMOL/L (ref 98–107)
CO2: 30 MMOL/L (ref 22–29)
CREAT SERPL-MCNC: 0.6 MG/DL (ref 0.7–1.2)
GFR AFRICAN AMERICAN: >60
GFR NON-AFRICAN AMERICAN: >60 ML/MIN/1.73
GLUCOSE BLD-MCNC: 128 MG/DL (ref 74–99)
HCT VFR BLD CALC: 36.8 % (ref 37–54)
HEMOGLOBIN: 11.7 G/DL (ref 12.5–16.5)
MAGNESIUM: 2.4 MG/DL (ref 1.6–2.6)
MCH RBC QN AUTO: 31.1 PG (ref 26–35)
MCHC RBC AUTO-ENTMCNC: 31.8 % (ref 32–34.5)
MCV RBC AUTO: 97.9 FL (ref 80–99.9)
PDW BLD-RTO: 14.7 FL (ref 11.5–15)
PHOSPHORUS: 3.3 MG/DL (ref 2.5–4.5)
PLATELET # BLD: 249 E9/L (ref 130–450)
PMV BLD AUTO: 11.4 FL (ref 7–12)
POTASSIUM SERPL-SCNC: 3.8 MMOL/L (ref 3.5–5)
RBC # BLD: 3.76 E12/L (ref 3.8–5.8)
SODIUM BLD-SCNC: 149 MMOL/L (ref 132–146)
WBC # BLD: 10.5 E9/L (ref 4.5–11.5)

## 2019-06-28 PROCEDURE — 6370000000 HC RX 637 (ALT 250 FOR IP): Performed by: NURSE PRACTITIONER

## 2019-06-28 PROCEDURE — 6360000002 HC RX W HCPCS: Performed by: INTERNAL MEDICINE

## 2019-06-28 PROCEDURE — 2700000000 HC OXYGEN THERAPY PER DAY

## 2019-06-28 PROCEDURE — 94669 MECHANICAL CHEST WALL OSCILL: CPT

## 2019-06-28 PROCEDURE — 85027 COMPLETE CBC AUTOMATED: CPT

## 2019-06-28 PROCEDURE — 84100 ASSAY OF PHOSPHORUS: CPT

## 2019-06-28 PROCEDURE — 80048 BASIC METABOLIC PNL TOTAL CA: CPT

## 2019-06-28 PROCEDURE — 6370000000 HC RX 637 (ALT 250 FOR IP): Performed by: PSYCHIATRY & NEUROLOGY

## 2019-06-28 PROCEDURE — 2580000003 HC RX 258: Performed by: INTERNAL MEDICINE

## 2019-06-28 PROCEDURE — 36415 COLL VENOUS BLD VENIPUNCTURE: CPT

## 2019-06-28 PROCEDURE — 83735 ASSAY OF MAGNESIUM: CPT

## 2019-06-28 PROCEDURE — 94640 AIRWAY INHALATION TREATMENT: CPT

## 2019-06-28 PROCEDURE — 2580000003 HC RX 258: Performed by: HOSPITALIST

## 2019-06-28 PROCEDURE — 6370000000 HC RX 637 (ALT 250 FOR IP): Performed by: INTERNAL MEDICINE

## 2019-06-28 PROCEDURE — 92507 TX SP LANG VOICE COMM INDIV: CPT

## 2019-06-28 RX ORDER — CLOPIDOGREL BISULFATE 75 MG/1
75 TABLET ORAL DAILY
Qty: 30 TABLET | Refills: 3
Start: 2019-06-29 | End: 2020-07-11 | Stop reason: ALTCHOICE

## 2019-06-28 RX ORDER — AMOXICILLIN AND CLAVULANATE POTASSIUM 500; 125 MG/1; MG/1
1 TABLET, FILM COATED ORAL 3 TIMES DAILY
Qty: 9 TABLET | Refills: 0 | Status: ON HOLD | OUTPATIENT
Start: 2019-06-28 | End: 2019-07-03

## 2019-06-28 RX ORDER — ATORVASTATIN CALCIUM 40 MG/1
40 TABLET, FILM COATED ORAL NIGHTLY
Qty: 30 TABLET | Refills: 3 | Status: SHIPPED
Start: 2019-06-28 | End: 2020-07-11 | Stop reason: ALTCHOICE

## 2019-06-28 RX ORDER — BISACODYL 10 MG
10 SUPPOSITORY, RECTAL RECTAL DAILY PRN
Qty: 30 SUPPOSITORY | Refills: 0
Start: 2019-06-28 | End: 2019-07-28

## 2019-06-28 RX ORDER — ASPIRIN 81 MG/1
81 TABLET, CHEWABLE ORAL DAILY
Qty: 30 TABLET | Refills: 3 | Status: SHIPPED
Start: 2019-06-29 | End: 2020-07-11 | Stop reason: ALTCHOICE

## 2019-06-28 RX ADMIN — IPRATROPIUM BROMIDE AND ALBUTEROL SULFATE 1 AMPULE: .5; 3 SOLUTION RESPIRATORY (INHALATION) at 10:22

## 2019-06-28 RX ADMIN — PIPERACILLIN SODIUM,TAZOBACTAM SODIUM 3.38 G: 3; .375 INJECTION, POWDER, FOR SOLUTION INTRAVENOUS at 05:56

## 2019-06-28 RX ADMIN — CLOPIDOGREL 75 MG: 75 TABLET, FILM COATED ORAL at 09:20

## 2019-06-28 RX ADMIN — IPRATROPIUM BROMIDE AND ALBUTEROL SULFATE 1 AMPULE: .5; 3 SOLUTION RESPIRATORY (INHALATION) at 12:59

## 2019-06-28 RX ADMIN — IPRATROPIUM BROMIDE AND ALBUTEROL SULFATE 1 AMPULE: .5; 3 SOLUTION RESPIRATORY (INHALATION) at 16:47

## 2019-06-28 RX ADMIN — BUDESONIDE 250 MCG: 0.25 SUSPENSION RESPIRATORY (INHALATION) at 10:24

## 2019-06-28 RX ADMIN — Medication 10 ML: at 05:57

## 2019-06-28 RX ADMIN — PIPERACILLIN SODIUM,TAZOBACTAM SODIUM 3.38 G: 3; .375 INJECTION, POWDER, FOR SOLUTION INTRAVENOUS at 14:09

## 2019-06-28 RX ADMIN — ASPIRIN 81 MG 81 MG: 81 TABLET ORAL at 09:20

## 2019-06-28 ASSESSMENT — PAIN SCALES - GENERAL
PAINLEVEL_OUTOF10: 0
PAINLEVEL_OUTOF10: 0

## 2019-06-28 NOTE — DISCHARGE INSTR - COC
Continuity of Care Form    Patient Name: Teri Hauser   :  1950  MRN:  [id number]    Admit date:  2019  Discharge date:  2019    Code Status Order: Full Code   Advance Directives:   Advance Care Flowsheet Documentation     Date/Time Healthcare Directive Type of Healthcare Directive Copy in [address] Agent's Name Healthcare Agent's Phone Number    19 1714  No, patient does not have an advance directive for healthcare treatment -- -- -- -- --          Admitting Physician:  Irena Richmond MD  PCP: Bravo Arroyo DO    Discharging Nurse: [facility] Unit/Room#: 8510/8510-A  Discharging Unit Phone Number: [phone]    Emergency Contact:   Extended Emergency Contact Information  Primary Emergency Contact: Edson Min  Address: [address] [address] Phone: [phone]  Relation: Spouse  Secondary Emergency Contact: Jenni Lee  Address: [address] [place] [address] Phone: [phone]  Relation: Child    Past Surgical History:  Past Surgical History:   Procedure Laterality Date    DENTAL SURGERY Bilateral 2018    FRACTURE SURGERY      right thumb    MOUTH BIOPSY  2012    right lateral pharynx biopsy. Immunization History: There is no immunization history on file for this patient.     Active Problems:  Patient Active Problem List   Diagnosis Code    Esophageal cancer ([facility] [address] C15.9    Oral pharyngeal candidiasis B37.0    COPD (chronic obstructive pulmonary disease) ([facility]) J44.9    Mucositis due to antineoplastic therapy K12.31    Thrush of mouth and esophagus ([facility]) B37.81, B37.0    Pancytopenia ([facility]) D61.818    Febrile neutropenia ([facility]) D70.9, R50.81    Acute ischemic cerebrovascular accident (CVA) involving left middle cerebral artery territory [facility]) Z27.024    Cerebrovascular accident (CVA) due to occlusion

## 2019-06-28 NOTE — PROGRESS NOTES
soft, non-tender, non-distended, normal bowel sounds, no masses or organomegaly   Extremities:no edema  Musculoskeletal: normal range of motion, no joint swelling, deformity or tenderness   Neurologic: reflexes normal and symmetric, no cranial nerve deficit noted    LABS/IMAGING:    CBC:  Lab Results   Component Value Date    WBC 12.3 (H) 06/27/2019    HGB 11.2 (L) 06/27/2019    HCT 34.3 (L) 06/27/2019    MCV 96.6 06/27/2019     06/27/2019    LYMPHOPCT 22.0 06/19/2019    RBC 3.55 (L) 06/27/2019    MCH 31.5 06/27/2019    MCHC 32.7 06/27/2019    RDW 14.6 06/27/2019    NEUTOPHILPCT 63.0 06/19/2019    MONOPCT 11.2 06/19/2019    BASOPCT 0.4 06/19/2019    NEUTROABS 4.43 06/19/2019    LYMPHSABS 1.55 06/19/2019    MONOSABS 0.79 06/19/2019    EOSABS 0.23 06/19/2019    BASOSABS 0.03 06/19/2019       Recent Labs     06/27/19  0452 06/26/19  0526 06/25/19  0457   WBC 12.3* 15.2* 12.9*   HGB 11.2* 12.4* 12.4*   HCT 34.3* 37.5 36.9*   MCV 96.6 95.9 94.1    219 217       BMP:   Recent Labs     06/25/19  0457 06/26/19  0526 06/27/19  0452    140 143   K 3.5 3.7 3.5   CL 99 98 101   CO2 26 27 31*   PHOS 2.8 3.2 2.5   BUN 18 30* 30*   CREATININE 0.7 0.8 0.6*       MG:   Lab Results   Component Value Date    MG 2.2 06/27/2019     Ca/Phos:   Lab Results   Component Value Date    CALCIUM 9.4 06/27/2019    PHOS 2.5 06/27/2019     Amylase:   Lab Results   Component Value Date    AMYLASE 4 09/22/2012     Lipase:   Lab Results   Component Value Date    LIPASE 16 09/22/2012     LIVER PROFILE: No results for input(s): AST, ALT, LIPASE, BILIDIR, BILITOT, ALKPHOS in the last 72 hours. Invalid input(s): AMYLASE,  ALB    PT/INR: No results for input(s): PROTIME, INR in the last 72 hours. APTT: No results for input(s): APTT in the last 72 hours.     Cardiac Enzymes:  Lab Results   Component Value Date    CKTOTAL 10 (L) 08/09/2012    CKMB <0.3 08/09/2012    TROPONINI <0.01 06/19/2019             PROBLEM LIST:  Patient Active

## 2019-06-28 NOTE — PLAN OF CARE
Problem: Falls - Risk of:  Goal: Will remain free from falls  Description  Will remain free from falls  Outcome: Met This Shift  Goal: Absence of physical injury  Description  Absence of physical injury  Outcome: Met This Shift     Problem: Risk for Impaired Skin Integrity  Goal: Tissue integrity - skin and mucous membranes  Description  Structural intactness and normal physiological function of skin and  mucous membranes.   Outcome: Met This Shift     Problem: HEMODYNAMIC STATUS  Goal: Patient has stable vital signs and fluid balance  Outcome: Met This Shift     Problem: COMMUNICATION IMPAIRMENT  Goal: Ability to express needs and understand communication  Outcome: Met This Shift     Problem: Pain:  Goal: Pain level will decrease  Description  Pain level will decrease  Outcome: Met This Shift  Goal: Control of acute pain  Description  Control of acute pain  Outcome: Met This Shift  Goal: Control of chronic pain  Description  Control of chronic pain  Outcome: Met This Shift

## 2019-06-28 NOTE — PROGRESS NOTES
Hospitalist Progress Note      Synopsis: Patient admitted on 6/19/2019 for acute left MCA stroke    Subjective  Patient seen and examined    3 L NC  Doing well      Exam:  BP (!) 146/78   Pulse 81   Temp 97.8 °F (36.6 °C) (Temporal)   Resp 18   Ht 6' (1.829 m)   Wt 159 lb (72.1 kg)   SpO2 94%   BMI 21.56 kg/m²   General appearance: No apparent distress, appears stated age and cooperative. + dysarthria / aphasic  HEENT: Pupils equal, round, and reactive to light. Conjunctivae/corneas clear. Neck: Supple. No jugular venous distention. Trachea midline. Respiratory:  Normal respiratory effort. Clear to auscultation, bilaterally without Rales/Wheezes/Rhonchi. Cardiovascular: Regular rate and rhythm with normal S1/S2 without murmurs, rubs or gallops. Abdomen: Soft, non-tender, non-distended with normal bowel sounds. Musculoskeletal: No clubbing, cyanosis or edema bilaterally. Brisk capillary refill. 2+ lower extremity pulses (dorsalis pedis).    Skin:  No rashes    Neurologic: awake, alert and following commands ; Right UE and LE flaccid    Medications:  Reviewed    Infusion Medications     Scheduled Medications    ipratropium-albuterol  1 ampule Inhalation 4x daily    budesonide  0.25 mg Nebulization BID    piperacillin-tazobactam  3.375 g Intravenous Q8H    clopidogrel  75 mg Oral Daily    sodium chloride flush  10 mL Intravenous 2 times per day    atorvastatin  40 mg Oral Nightly    aspirin  81 mg Oral Daily     PRN Meds: acetaminophen, sodium chloride, HYDROmorphone, bisacodyl, sodium chloride flush, magnesium hydroxide, ondansetron    I/O    Intake/Output Summary (Last 24 hours) at 6/28/2019 1134  Last data filed at 6/28/2019 0718  Gross per 24 hour   Intake 1538 ml   Output 2775 ml   Net -1237 ml       Labs:   Recent Labs     06/26/19  0526 06/27/19  0452 06/28/19  0432   WBC 15.2* 12.3* 10.5   HGB 12.4* 11.2* 11.7*   HCT 37.5 34.3* 36.8*    209 249       Recent Labs     06/26/19  0526 06/27/19  0452 06/28/19  0432    143 149*   K 3.7 3.5 3.8   CL 98 101 106   CO2 27 31* 30*   BUN 30* 30* 30*   CREATININE 0.8 0.6* 0.6*   CALCIUM 9.9 9.4 9.6   PHOS 3.2 2.5 3.3       No results for input(s): PROT, ALB, ALKPHOS, ALT, AST, BILITOT, AMYLASE, LIPASE in the last 72 hours. No results for input(s): INR in the last 72 hours. No results for input(s): Kathlee Hylan in the last 72 hours. Chronic labs:  Lab Results   Component Value Date    CHOL 146 06/20/2019    TRIG 66 06/20/2019    HDL 39 06/20/2019    LDLCALC 94 06/20/2019    TSH 0.977 04/06/2017    PSA 2.08 02/25/2015    INR 1.0 06/19/2019    LABA1C 5.9 (H) 06/20/2019       Radiology:  Imaging studies reviewed today. ASSESSMENT:    Active Problems:    Esophageal cancer (HCC)    COPD (chronic obstructive pulmonary disease) (HCC)    Acute ischemic cerebrovascular accident (CVA) involving left middle cerebral artery territory ([address]    Dysphagia, oropharyngeal    Moderate protein-calorie malnutrition (HCC)    Carotid artery stenosis, symptomatic, right  Resolved Problems:    * No resolved hospital problems. *  Severe emphysema  COPD  Atelectasis  Hemoptysis  Hilar LAD    PLAN:    Tolerating TF  Encouraged [facility]  Chest vest, IV Zosyn - pulm following > transition to PO augmentin  Disposition planning for acute rehab at [facility] ok today      Diet: DIET TUBE FEED CONTINUOUS/CYCLIC NPO; Diabetic 1.5; Gastrostomy; Continuous; 25; 50; 24  Code Status: Full Code    +++++++++++++++++++++++++++++++++++++++++++++++++  Betito Mcelroy MD   [facility].  +++++++++++++++++++++++++++++++++++++++++++++++++  NOTE: This report was transcribed using voice recognition software. Every effort was made to ensure accuracy; however, inadvertent computerized transcription errors may be present.

## 2019-07-01 ENCOUNTER — APPOINTMENT (OUTPATIENT)
Dept: GENERAL RADIOLOGY | Age: 69
DRG: 640 | End: 2019-07-01
Payer: MEDICARE

## 2019-07-01 ENCOUNTER — HOSPITAL ENCOUNTER (INPATIENT)
Age: 69
LOS: 7 days | Discharge: NURSING, REHAB OR LONG-TERM CARE | DRG: 640 | End: 2019-07-08
Attending: EMERGENCY MEDICINE | Admitting: INTERNAL MEDICINE
Payer: MEDICARE

## 2019-07-01 ENCOUNTER — APPOINTMENT (OUTPATIENT)
Dept: CT IMAGING | Age: 69
DRG: 640 | End: 2019-07-01
Payer: MEDICARE

## 2019-07-01 DIAGNOSIS — E87.0 HYPERNATREMIA: Primary | ICD-10-CM

## 2019-07-01 LAB
ALBUMIN SERPL-MCNC: 3.4 G/DL (ref 3.5–5.2)
ALP BLD-CCNC: 85 U/L (ref 40–129)
ALT SERPL-CCNC: 40 U/L (ref 0–40)
ANION GAP SERPL CALCULATED.3IONS-SCNC: 10 MMOL/L (ref 7–16)
AST SERPL-CCNC: 26 U/L (ref 0–39)
BASOPHILS ABSOLUTE: 0.02 E9/L (ref 0–0.2)
BASOPHILS RELATIVE PERCENT: 0.2 % (ref 0–2)
BILIRUB SERPL-MCNC: 0.4 MG/DL (ref 0–1.2)
BILIRUBIN URINE: NEGATIVE
BLOOD, URINE: NEGATIVE
BUN BLDV-MCNC: 31 MG/DL (ref 8–23)
CALCIUM SERPL-MCNC: 9.9 MG/DL (ref 8.6–10.2)
CHLORIDE BLD-SCNC: 119 MMOL/L (ref 98–107)
CLARITY: CLEAR
CO2: 29 MMOL/L (ref 22–29)
COLOR: YELLOW
CREAT SERPL-MCNC: 0.7 MG/DL (ref 0.7–1.2)
EOSINOPHILS ABSOLUTE: 0.06 E9/L (ref 0.05–0.5)
EOSINOPHILS RELATIVE PERCENT: 0.7 % (ref 0–6)
GFR AFRICAN AMERICAN: >60
GFR AFRICAN AMERICAN: >60
GFR NON-AFRICAN AMERICAN: >60 ML/MIN/1.73
GFR NON-AFRICAN AMERICAN: >60 ML/MIN/1.73
GLUCOSE BLD-MCNC: 122 MG/DL (ref 74–99)
GLUCOSE BLD-MCNC: 123 MG/DL (ref 74–99)
GLUCOSE URINE: NEGATIVE MG/DL
HCT VFR BLD CALC: 42.6 % (ref 37–54)
HEMOGLOBIN: 13.5 G/DL (ref 12.5–16.5)
IMMATURE GRANULOCYTES #: 0.07 E9/L
IMMATURE GRANULOCYTES %: 0.8 % (ref 0–5)
KETONES, URINE: NEGATIVE MG/DL
LEUKOCYTE ESTERASE, URINE: NEGATIVE
LYMPHOCYTES ABSOLUTE: 0.75 E9/L (ref 1.5–4)
LYMPHOCYTES RELATIVE PERCENT: 8.1 % (ref 20–42)
MCH RBC QN AUTO: 31.5 PG (ref 26–35)
MCHC RBC AUTO-ENTMCNC: 31.7 % (ref 32–34.5)
MCV RBC AUTO: 99.3 FL (ref 80–99.9)
MONOCYTES ABSOLUTE: 0.98 E9/L (ref 0.1–0.95)
MONOCYTES RELATIVE PERCENT: 10.6 % (ref 2–12)
NEUTROPHILS ABSOLUTE: 7.34 E9/L (ref 1.8–7.3)
NEUTROPHILS RELATIVE PERCENT: 79.6 % (ref 43–80)
NITRITE, URINE: NEGATIVE
OSMOLALITY URINE: 577 MOSM/KG (ref 300–900)
OSMOLALITY: 351 MOSM/KG (ref 285–310)
PDW BLD-RTO: 14.6 FL (ref 11.5–15)
PERFORMED ON: ABNORMAL
PH UA: 5.5 (ref 5–9)
PLATELET # BLD: 322 E9/L (ref 130–450)
PMV BLD AUTO: 11.6 FL (ref 7–12)
POC CHLORIDE: 125 MMOL/L (ref 100–108)
POC CREATININE: 0.8 MG/DL (ref 0.7–1.2)
POC POTASSIUM: 3.9 MMOL/L (ref 3.5–5)
POC SODIUM: 163 MMOL/L (ref 132–146)
POTASSIUM REFLEX MAGNESIUM: 4 MMOL/L (ref 3.5–5)
PROTEIN UA: NEGATIVE MG/DL
RBC # BLD: 4.29 E12/L (ref 3.8–5.8)
SODIUM BLD-SCNC: 158 MMOL/L (ref 132–146)
SPECIFIC GRAVITY UA: 1.01 (ref 1–1.03)
T4 TOTAL: 7.6 MCG/DL (ref 4.5–11.7)
TOTAL PROTEIN: 7.5 G/DL (ref 6.4–8.3)
TROPONIN: <0.01 NG/ML (ref 0–0.03)
TSH SERPL DL<=0.05 MIU/L-ACNC: 0.79 UIU/ML (ref 0.27–4.2)
UROBILINOGEN, URINE: 0.2 E.U./DL
WBC # BLD: 9.2 E9/L (ref 4.5–11.5)

## 2019-07-01 PROCEDURE — 2580000003 HC RX 258: Performed by: INTERNAL MEDICINE

## 2019-07-01 PROCEDURE — 71045 X-RAY EXAM CHEST 1 VIEW: CPT

## 2019-07-01 PROCEDURE — 94664 DEMO&/EVAL PT USE INHALER: CPT

## 2019-07-01 PROCEDURE — 6360000002 HC RX W HCPCS: Performed by: INTERNAL MEDICINE

## 2019-07-01 PROCEDURE — 2700000000 HC OXYGEN THERAPY PER DAY

## 2019-07-01 PROCEDURE — 82565 ASSAY OF CREATININE: CPT

## 2019-07-01 PROCEDURE — 85025 COMPLETE CBC W/AUTO DIFF WBC: CPT

## 2019-07-01 PROCEDURE — 84436 ASSAY OF TOTAL THYROXINE: CPT

## 2019-07-01 PROCEDURE — 6370000000 HC RX 637 (ALT 250 FOR IP): Performed by: INTERNAL MEDICINE

## 2019-07-01 PROCEDURE — 70450 CT HEAD/BRAIN W/O DYE: CPT

## 2019-07-01 PROCEDURE — 82435 ASSAY OF BLOOD CHLORIDE: CPT

## 2019-07-01 PROCEDURE — 99285 EMERGENCY DEPT VISIT HI MDM: CPT

## 2019-07-01 PROCEDURE — 80053 COMPREHEN METABOLIC PANEL: CPT

## 2019-07-01 PROCEDURE — 84484 ASSAY OF TROPONIN QUANT: CPT

## 2019-07-01 PROCEDURE — 93005 ELECTROCARDIOGRAM TRACING: CPT | Performed by: EMERGENCY MEDICINE

## 2019-07-01 PROCEDURE — 83930 ASSAY OF BLOOD OSMOLALITY: CPT

## 2019-07-01 PROCEDURE — 83935 ASSAY OF URINE OSMOLALITY: CPT

## 2019-07-01 PROCEDURE — 36415 COLL VENOUS BLD VENIPUNCTURE: CPT

## 2019-07-01 PROCEDURE — 84295 ASSAY OF SERUM SODIUM: CPT

## 2019-07-01 PROCEDURE — 2060000000 HC ICU INTERMEDIATE R&B

## 2019-07-01 PROCEDURE — 94761 N-INVAS EAR/PLS OXIMETRY MLT: CPT

## 2019-07-01 PROCEDURE — 84443 ASSAY THYROID STIM HORMONE: CPT

## 2019-07-01 PROCEDURE — 84132 ASSAY OF SERUM POTASSIUM: CPT

## 2019-07-01 PROCEDURE — 81003 URINALYSIS AUTO W/O SCOPE: CPT

## 2019-07-01 PROCEDURE — 82947 ASSAY GLUCOSE BLOOD QUANT: CPT

## 2019-07-01 RX ORDER — DOCUSATE SODIUM 100 MG/1
100 CAPSULE, LIQUID FILLED ORAL 2 TIMES DAILY
COMMUNITY
Start: 2019-07-01 | End: 2020-07-11 | Stop reason: ALTCHOICE

## 2019-07-01 RX ORDER — ONDANSETRON 2 MG/ML
4 INJECTION INTRAMUSCULAR; INTRAVENOUS EVERY 6 HOURS PRN
Status: DISCONTINUED | OUTPATIENT
Start: 2019-07-01 | End: 2019-07-08 | Stop reason: HOSPADM

## 2019-07-01 RX ORDER — BISACODYL 10 MG
10 SUPPOSITORY, RECTAL RECTAL DAILY PRN
Status: DISCONTINUED | OUTPATIENT
Start: 2019-07-01 | End: 2019-07-08 | Stop reason: HOSPADM

## 2019-07-01 RX ORDER — SODIUM CHLORIDE 0.9 % (FLUSH) 0.9 %
10 SYRINGE (ML) INJECTION EVERY 12 HOURS SCHEDULED
Status: DISCONTINUED | OUTPATIENT
Start: 2019-07-01 | End: 2019-07-08 | Stop reason: HOSPADM

## 2019-07-01 RX ORDER — ALBUTEROL SULFATE 2.5 MG/3ML
2.5 SOLUTION RESPIRATORY (INHALATION) 2 TIMES DAILY
Status: DISCONTINUED | OUTPATIENT
Start: 2019-07-01 | End: 2019-07-08 | Stop reason: HOSPADM

## 2019-07-01 RX ORDER — POLYETHYLENE GLYCOL 3350 17 G/17G
17 POWDER, FOR SOLUTION ORAL DAILY
Status: DISCONTINUED | OUTPATIENT
Start: 2019-07-01 | End: 2019-07-08 | Stop reason: HOSPADM

## 2019-07-01 RX ORDER — POLYETHYLENE GLYCOL 3350 17 G/17G
17 POWDER, FOR SOLUTION ORAL DAILY
COMMUNITY
Start: 2019-07-01

## 2019-07-01 RX ORDER — CLOPIDOGREL BISULFATE 75 MG/1
75 TABLET ORAL DAILY
Status: DISCONTINUED | OUTPATIENT
Start: 2019-07-02 | End: 2019-07-08 | Stop reason: HOSPADM

## 2019-07-01 RX ORDER — HEPARIN SODIUM 5000 [USP'U]/ML
5000 INJECTION, SOLUTION INTRAVENOUS; SUBCUTANEOUS EVERY 12 HOURS
Status: ON HOLD | COMMUNITY
Start: 2019-07-01 | End: 2020-07-15 | Stop reason: HOSPADM

## 2019-07-01 RX ORDER — HEPARIN SODIUM 10000 [USP'U]/ML
5000 INJECTION, SOLUTION INTRAVENOUS; SUBCUTANEOUS EVERY 12 HOURS
Status: DISCONTINUED | OUTPATIENT
Start: 2019-07-01 | End: 2019-07-08 | Stop reason: HOSPADM

## 2019-07-01 RX ORDER — ALBUTEROL SULFATE 2.5 MG/3ML
2.5 SOLUTION RESPIRATORY (INHALATION) EVERY 4 HOURS PRN
COMMUNITY
End: 2020-07-11 | Stop reason: ALTCHOICE

## 2019-07-01 RX ORDER — ASPIRIN 81 MG/1
81 TABLET, CHEWABLE ORAL DAILY
Status: DISCONTINUED | OUTPATIENT
Start: 2019-07-02 | End: 2019-07-08 | Stop reason: HOSPADM

## 2019-07-01 RX ORDER — AMOXICILLIN AND CLAVULANATE POTASSIUM 500; 125 MG/1; MG/1
1 TABLET, FILM COATED ORAL 3 TIMES DAILY
Status: DISCONTINUED | OUTPATIENT
Start: 2019-07-01 | End: 2019-07-06

## 2019-07-01 RX ORDER — ATORVASTATIN CALCIUM 40 MG/1
40 TABLET, FILM COATED ORAL NIGHTLY
Status: DISCONTINUED | OUTPATIENT
Start: 2019-07-01 | End: 2019-07-08 | Stop reason: HOSPADM

## 2019-07-01 RX ORDER — DEXTROSE MONOHYDRATE 50 MG/ML
INJECTION, SOLUTION INTRAVENOUS CONTINUOUS
Status: DISCONTINUED | OUTPATIENT
Start: 2019-07-01 | End: 2019-07-03

## 2019-07-01 RX ORDER — ALBUTEROL SULFATE 2.5 MG/3ML
2.5 SOLUTION RESPIRATORY (INHALATION) EVERY 4 HOURS PRN
Status: DISCONTINUED | OUTPATIENT
Start: 2019-07-01 | End: 2019-07-08 | Stop reason: HOSPADM

## 2019-07-01 RX ORDER — ALBUTEROL SULFATE 2.5 MG/3ML
2.5 SOLUTION RESPIRATORY (INHALATION) 2 TIMES DAILY
COMMUNITY
End: 2020-07-11 | Stop reason: ALTCHOICE

## 2019-07-01 RX ORDER — DEXTROSE MONOHYDRATE 50 MG/ML
INJECTION, SOLUTION INTRAVENOUS CONTINUOUS
Status: DISCONTINUED | OUTPATIENT
Start: 2019-07-01 | End: 2019-07-01 | Stop reason: SDUPTHER

## 2019-07-01 RX ORDER — SODIUM CHLORIDE 0.9 % (FLUSH) 0.9 %
10 SYRINGE (ML) INJECTION PRN
Status: DISCONTINUED | OUTPATIENT
Start: 2019-07-01 | End: 2019-07-08 | Stop reason: HOSPADM

## 2019-07-01 RX ORDER — DOCUSATE SODIUM 100 MG/1
100 CAPSULE, LIQUID FILLED ORAL 2 TIMES DAILY
Status: DISCONTINUED | OUTPATIENT
Start: 2019-07-01 | End: 2019-07-02

## 2019-07-01 RX ADMIN — AMOXICILLIN AND CLAVULANATE POTASSIUM 1 TABLET: 500; 125 TABLET, FILM COATED ORAL at 23:08

## 2019-07-01 RX ADMIN — POLYETHYLENE GLYCOL 3350 17 G: 17 POWDER, FOR SOLUTION ORAL at 23:08

## 2019-07-01 RX ADMIN — HEPARIN SODIUM 5000 UNITS: 10000 INJECTION INTRAVENOUS; SUBCUTANEOUS at 23:08

## 2019-07-01 RX ADMIN — ATORVASTATIN CALCIUM 40 MG: 40 TABLET, FILM COATED ORAL at 23:08

## 2019-07-01 RX ADMIN — ALBUTEROL SULFATE 2.5 MG: 2.5 SOLUTION RESPIRATORY (INHALATION) at 20:40

## 2019-07-01 RX ADMIN — DEXTROSE MONOHYDRATE: 50 INJECTION, SOLUTION INTRAVENOUS at 21:52

## 2019-07-01 ASSESSMENT — PAIN SCALES - GENERAL: PAINLEVEL_OUTOF10: 0

## 2019-07-01 NOTE — ED PROVIDER NOTES
albuterol (PROVENTIL) nebulizer solution 2.5 mg (has no administration in time range)   amoxicillin-clavulanate (AUGMENTIN) 500-125 MG per tablet 1 tablet (has no administration in time range)   aspirin chewable tablet 81 mg (has no administration in time range)   atorvastatin (LIPITOR) tablet 40 mg (has no administration in time range)   bisacodyl (DULCOLAX) suppository 10 mg (has no administration in time range)   clopidogrel (PLAVIX) tablet 75 mg (has no administration in time range)   docusate sodium (COLACE) capsule 100 mg (has no administration in time range)   heparin (porcine) injection 5,000 Units (has no administration in time range)   polyethylene glycol (GLYCOLAX) packet 17 g (has no administration in time range)   sodium chloride (OCEAN, BABY AYR) 0.65 % nasal spray 1 spray (has no administration in time range)   dextrose 5 % solution ( Intravenous Rate/Dose Change 7/1/19 8124)             Medical Decision Making:   Pt presents for Hypernatremia and fatigue Per [facility] rehab. Labs and imaging obtained, reviewed; results discussed with patient. The patient was found to be hypernatremic at 158. Patient was attempted to be discharged back to [facility] rehabilitation. I did speak with the physician Dr. Eri Gaitan. He states that free water flushes his sodium levels are not able to be monitored at the facility or changed. Patient will be admitted to internal medicine at our facility. Re-Evaluations: The patient is in the bed in no acute distress. Result today discussed with the patient and family. Consultations:             Time: 0: Spoke with Dr. Eri Gaitan from [facility] rehab dates that the sodium adjustments are not something that can be managed at their facility. He states that the patient will need admission. Spoke with Dr. Indra Aceves She relates that the patient for admission.     Critical Care:0 minutes        This patient's ED course included: a personal history and physicial

## 2019-07-01 NOTE — ED NOTES
Urine obtained via straight cath. Bedding changed at that time. Patient tolerated well.      Sandie Bynum RN  07/01/19 5586

## 2019-07-02 PROBLEM — J96.01 ACUTE HYPOXEMIC RESPIRATORY FAILURE (HCC): Status: ACTIVE | Noted: 2019-07-02

## 2019-07-02 PROBLEM — R31.9 PAINLESS HEMATURIA: Status: ACTIVE | Noted: 2019-07-02

## 2019-07-02 LAB
ANION GAP SERPL CALCULATED.3IONS-SCNC: 11 MMOL/L (ref 7–16)
ANION GAP SERPL CALCULATED.3IONS-SCNC: 5 MMOL/L (ref 7–16)
ANION GAP SERPL CALCULATED.3IONS-SCNC: 7 MMOL/L (ref 7–16)
ANION GAP SERPL CALCULATED.3IONS-SCNC: 9 MMOL/L (ref 7–16)
BUN BLDV-MCNC: 27 MG/DL (ref 8–23)
BUN BLDV-MCNC: 28 MG/DL (ref 8–23)
BUN BLDV-MCNC: 31 MG/DL (ref 8–23)
BUN BLDV-MCNC: 32 MG/DL (ref 8–23)
CALCIUM SERPL-MCNC: 8.6 MG/DL (ref 8.6–10.2)
CALCIUM SERPL-MCNC: 9.2 MG/DL (ref 8.6–10.2)
CALCIUM SERPL-MCNC: 9.4 MG/DL (ref 8.6–10.2)
CALCIUM SERPL-MCNC: 9.8 MG/DL (ref 8.6–10.2)
CHLORIDE BLD-SCNC: 107 MMOL/L (ref 98–107)
CHLORIDE BLD-SCNC: 116 MMOL/L (ref 98–107)
CHLORIDE BLD-SCNC: 118 MMOL/L (ref 98–107)
CHLORIDE BLD-SCNC: 122 MMOL/L (ref 98–107)
CO2: 27 MMOL/L (ref 22–29)
CO2: 30 MMOL/L (ref 22–29)
CO2: 32 MMOL/L (ref 22–29)
CO2: 34 MMOL/L (ref 22–29)
CREAT SERPL-MCNC: 0.7 MG/DL (ref 0.7–1.2)
CREAT SERPL-MCNC: 0.8 MG/DL (ref 0.7–1.2)
CREAT SERPL-MCNC: 0.8 MG/DL (ref 0.7–1.2)
CREAT SERPL-MCNC: 0.9 MG/DL (ref 0.7–1.2)
GFR AFRICAN AMERICAN: >60
GFR NON-AFRICAN AMERICAN: >60 ML/MIN/1.73
GLUCOSE BLD-MCNC: 130 MG/DL (ref 74–99)
GLUCOSE BLD-MCNC: 133 MG/DL (ref 74–99)
GLUCOSE BLD-MCNC: 183 MG/DL (ref 74–99)
GLUCOSE BLD-MCNC: 466 MG/DL (ref 74–99)
HCT VFR BLD CALC: 41.9 % (ref 37–54)
HEMOGLOBIN: 12.8 G/DL (ref 12.5–16.5)
MCH RBC QN AUTO: 31 PG (ref 26–35)
MCHC RBC AUTO-ENTMCNC: 30.5 % (ref 32–34.5)
MCV RBC AUTO: 101.5 FL (ref 80–99.9)
METER GLUCOSE: 161 MG/DL (ref 74–99)
PDW BLD-RTO: 14.5 FL (ref 11.5–15)
PLATELET # BLD: 298 E9/L (ref 130–450)
PMV BLD AUTO: 11.4 FL (ref 7–12)
POTASSIUM SERPL-SCNC: 3 MMOL/L (ref 3.5–5)
POTASSIUM SERPL-SCNC: 3.3 MMOL/L (ref 3.5–5)
POTASSIUM SERPL-SCNC: 3.5 MMOL/L (ref 3.5–5)
POTASSIUM SERPL-SCNC: 3.6 MMOL/L (ref 3.5–5)
RBC # BLD: 4.13 E12/L (ref 3.8–5.8)
SODIUM BLD-SCNC: 145 MMOL/L (ref 132–146)
SODIUM BLD-SCNC: 155 MMOL/L (ref 132–146)
SODIUM BLD-SCNC: 157 MMOL/L (ref 132–146)
SODIUM BLD-SCNC: 161 MMOL/L (ref 132–146)
WBC # BLD: 7.2 E9/L (ref 4.5–11.5)

## 2019-07-02 PROCEDURE — 80048 BASIC METABOLIC PNL TOTAL CA: CPT

## 2019-07-02 PROCEDURE — 6360000002 HC RX W HCPCS: Performed by: INTERNAL MEDICINE

## 2019-07-02 PROCEDURE — 94640 AIRWAY INHALATION TREATMENT: CPT

## 2019-07-02 PROCEDURE — 97162 PT EVAL MOD COMPLEX 30 MIN: CPT | Performed by: PHYSICAL THERAPIST

## 2019-07-02 PROCEDURE — 6370000000 HC RX 637 (ALT 250 FOR IP): Performed by: INTERNAL MEDICINE

## 2019-07-02 PROCEDURE — 82962 GLUCOSE BLOOD TEST: CPT

## 2019-07-02 PROCEDURE — 36415 COLL VENOUS BLD VENIPUNCTURE: CPT

## 2019-07-02 PROCEDURE — 85027 COMPLETE CBC AUTOMATED: CPT

## 2019-07-02 PROCEDURE — 2060000000 HC ICU INTERMEDIATE R&B

## 2019-07-02 PROCEDURE — 97530 THERAPEUTIC ACTIVITIES: CPT

## 2019-07-02 PROCEDURE — 97530 THERAPEUTIC ACTIVITIES: CPT | Performed by: PHYSICAL THERAPIST

## 2019-07-02 PROCEDURE — 2580000003 HC RX 258: Performed by: INTERNAL MEDICINE

## 2019-07-02 PROCEDURE — 97166 OT EVAL MOD COMPLEX 45 MIN: CPT

## 2019-07-02 PROCEDURE — 2700000000 HC OXYGEN THERAPY PER DAY

## 2019-07-02 RX ORDER — DOCUSATE SODIUM 50 MG/5ML
100 LIQUID ORAL 2 TIMES DAILY
Status: DISCONTINUED | OUTPATIENT
Start: 2019-07-02 | End: 2019-07-08 | Stop reason: HOSPADM

## 2019-07-02 RX ADMIN — ALBUTEROL SULFATE 2.5 MG: 2.5 SOLUTION RESPIRATORY (INHALATION) at 10:11

## 2019-07-02 RX ADMIN — ASPIRIN 81 MG 81 MG: 81 TABLET ORAL at 09:07

## 2019-07-02 RX ADMIN — HEPARIN SODIUM 5000 UNITS: 10000 INJECTION INTRAVENOUS; SUBCUTANEOUS at 09:07

## 2019-07-02 RX ADMIN — CLOPIDOGREL 75 MG: 75 TABLET, FILM COATED ORAL at 09:07

## 2019-07-02 RX ADMIN — HEPARIN SODIUM 5000 UNITS: 10000 INJECTION INTRAVENOUS; SUBCUTANEOUS at 20:14

## 2019-07-02 RX ADMIN — DEXTROSE MONOHYDRATE: 50 INJECTION, SOLUTION INTRAVENOUS at 09:36

## 2019-07-02 RX ADMIN — AMOXICILLIN AND CLAVULANATE POTASSIUM 1 TABLET: 500; 125 TABLET, FILM COATED ORAL at 13:23

## 2019-07-02 RX ADMIN — ALBUTEROL SULFATE 2.5 MG: 2.5 SOLUTION RESPIRATORY (INHALATION) at 20:36

## 2019-07-02 RX ADMIN — AMOXICILLIN AND CLAVULANATE POTASSIUM 1 TABLET: 500; 125 TABLET, FILM COATED ORAL at 21:30

## 2019-07-02 RX ADMIN — DOCUSATE SODIUM 100 MG: 50 LIQUID ORAL at 21:31

## 2019-07-02 RX ADMIN — POLYETHYLENE GLYCOL 3350 17 G: 17 POWDER, FOR SOLUTION ORAL at 09:07

## 2019-07-02 RX ADMIN — ATORVASTATIN CALCIUM 40 MG: 40 TABLET, FILM COATED ORAL at 21:31

## 2019-07-02 RX ADMIN — DOCUSATE SODIUM 100 MG: 50 LIQUID ORAL at 09:11

## 2019-07-02 RX ADMIN — AMOXICILLIN AND CLAVULANATE POTASSIUM 1 TABLET: 500; 125 TABLET, FILM COATED ORAL at 09:07

## 2019-07-02 RX ADMIN — DEXTROSE MONOHYDRATE: 50 INJECTION, SOLUTION INTRAVENOUS at 17:14

## 2019-07-02 ASSESSMENT — PAIN SCALES - GENERAL
PAINLEVEL_OUTOF10: 0

## 2019-07-02 NOTE — PROGRESS NOTES
LTAC  [] Long term nursing home or group home [] Transfer to ICU  [] Transfer to PCU    Medications:  Scheduled Meds:   sodium chloride flush  10 mL Intravenous 2 times per day    albuterol  2.5 mg Nebulization BID    amoxicillin-clavulanate  1 tablet Oral TID    aspirin  81 mg Oral Daily    atorvastatin  40 mg Oral Nightly    clopidogrel  75 mg Oral Daily    docusate sodium  100 mg Oral BID    heparin (porcine)  5,000 Units Subcutaneous Q12H    polyethylene glycol  17 g Oral Daily       PRN Meds:  sodium chloride flush, magnesium hydroxide, ondansetron, albuterol, bisacodyl, sodium chloride    IV:   dextrose 100 mL/hr at 07/01/19 2219         Intake/Output Summary (Last 24 hours) at 7/2/2019 0752  Last data filed at 7/2/2019 2482  Gross per 24 hour   Intake 916 ml   Output 350 ml   Net 566 ml       Results:  CBC:   Recent Labs     07/01/19  1415 07/02/19  0554   WBC 9.2 7.2   HGB 13.5 12.8   HCT 42.6 41.9   MCV 99.3 101.5*    298     BMP:   Recent Labs     07/01/19  1415 07/02/19  0222 07/02/19  0554   * 157* 161*   K 4.0 3.5 3.6   * 118* 122*   CO2 29 30* 34*   BUN 31* 32* 31*   CREATININE 0.7 0.9 0.8     Mag: No results for input(s): MAG in the last 72 hours. Phos:   Lab Results   Component Value Date    PHOS 3.3 06/28/2019     No components found for: GLU    LIVER PROFILE:   Recent Labs     07/01/19  1415   AST 26   ALT 40   BILITOT 0.4   ALKPHOS 85     PT/INR: No results for input(s): PROTIME, INR in the last 72 hours. APTT: No results for input(s): APTT in the last 72 hours.   UA:  Recent Labs     07/01/19  1835   COLORU Yellow   PHUR 5.5   CLARITYU Clear   SPECGRAV 1.015   LEUKOCYTESUR Negative   UROBILINOGEN 0.2   BILIRUBINUR Negative   BLOODU Negative   GLUCOSEU Negative       Invalid input(s): ABG  Lab Results   Component Value Date    CALCIUM 9.4 07/02/2019    PHOS 3.3 06/28/2019                 Electronically signed by Yobani Blackwell DO on 7/2/2019 at 7:52 AM

## 2019-07-02 NOTE — PROGRESS NOTES
[facility]  Physical Therapy Initial Evaluation    Name: Savannah Loomis  : 1950  MRN: [id number]    Date of Service: 2019        Evaluating Therapist: Aleida Dmuont PT, DPT       Equipment Recommendations: to be determined. Room #: 9547/3915-P  DIAGNOSIS: hypernatremia  PRECAUTIONS: recent history of CVA, fall risk    Social:  Pt lives with wife in a capecod but able to stay first floor, 1+1+3 steps and 0 rails to enter. Pt admit from acute rehab facility. Prior to admission pt walked with no device. At rehab facility pt working on standing and pivoting. Initial Evaluation  Date:  Treatment      Short Term/ Long Term   Goals   Was pt agreeable to Eval/treatment? yes     Does pt have pain? No c/o pain     Bed Mobility  Rolling: min A   Supine to sit: min A   Sit to supine: max A   Scooting: NT  Min A    Transfers Sit to stand: mod A x 2  Stand to sit: mod A x 2  Stand pivot: NT  Mod A    Ambulation    NT  NT   Stair negotiation: ascended and descended  NT  NT   AM-PAC Raw Score 10/24       Pt is alert and Oriented x3 - pt shakes head yes/no when given choices for year, month and place. ROM:  L LE grossly WFL  R LE limited throughout  Strength:   L LE grossly 4/5  R LE ankle 0/5, knee extension 2-/5, knee flexion 1/5, hip 1/5  Balance: standing max A x 2 with R knee blocked. Sensation: no c/o numbness/tingling. When light touch tested, pt reports L LE and R LE feel the same. Endurance: fair        ASSESSMENT  Pt displays functional ability as noted in the objective portion of this evaluation. Comments/Treatment:  Pt found laying in bed agreeable to evaluation. Pt's wife present. Wife assist with social history questions. Pt instructed in mobility. Pt left laying in bed with call button in reach end of evaluation. Patient education  Pt educated on mobility.      Patient response to education:   Pt verbalized understanding Pt demonstrated skill Pt requires further education in this area     x     Rehab potential is Good for reaching above PT goals. Pts/ family goals   1. None stated. Patient and or family understand(s) diagnosis, prognosis, and plan of care. PLAN  PT care will be provided in accordance with the objectives noted above. Whenever appropriate, clear delegation orders will be provided for nursing staff. Exercises and functional mobility practice will be used as well as appropriate assistive devices or modalities to obtain goals. Patient and family education will also be administered as needed. Frequency of treatments will be 2-5x/week x 4-5 days.     Time in: 0914  Time out: 0941  Evaluation + 15  minutes tx time    Juliet Medrano PT, DPT   License number:  [id number]

## 2019-07-02 NOTE — PROGRESS NOTES
Nutrition Assessment (Enteral Nutrition)    Type and Reason for Visit: Initial, Positive Nutrition Screen    Nutrition Recommendations: Continue NPO, Continue Glucerna 1.5 @ 50 ml/hr as ordered to provide 1200 ml TV, 1800 kcals, 99 gm pro, 911 ml free water. When IVF's are d/c'd recommend 150 ml fluid flush Q 4 hrs for a total of 1811 ml fluid/day. Nutrition Assessment: Pt moderately malnourished as evidenced by 10# (6%) wt loss x 13 days with moderate loss of subcutaneous fat/muscle mass. Pt at risk for further nutritional compromise 2/2 need for EN with hypernatremia. Recommend Glucerna 1.5 @ 50 ml/hr. IVFs are running at 150 ml/hr, when IVFs are d/c'd recommend 150 ml fluid flush Q 4 hrs. Malnutrition Assessment:  · Malnutrition Status: Meets the criteria for moderate malnutrition  · Context: Chronic illness  · Findings of the 6 clinical characteristics of malnutrition (Minimum of 2 out of 6 clinical characteristics is required to make the diagnosis of moderate or severe Protein Calorie Malnutrition based on AND/ASPEN Guidelines):  1. Energy Intake-Less than or equal to 75% of estimated energy requirementGreater than or equal to 7 days  (pt on Glucerna 1.2 @ 50 ml/hr PTA, often off TF for ~3 hours/day for therapies per family)  2. Weight Loss-5% loss or greater (x 13 days)  3. Fat Loss-Moderate subcutaneous fat loss, Orbital  4. Muscle Loss-Moderate muscle mass loss, Temples (temporalis muscle), Clavicles (pectoralis and deltoids), Calf (gastrocnemius)  5. Fluid Accumulation-No significant fluid accumulation    6.  Strength-Not measured    Nutrition Risk Level: High    Nutrition Needs:  · Estimated Daily Total Kcal: 0451-9795(MSJ 1490 x 1.2 SF )  · Estimated Daily Protein (g): 80-95(1.2-1.4 g/kg CBW)  · Estimated Daily Fluid (ml/day): 9134-2578 or per medical management(1 ml/kcal )    Nutrition Diagnosis:   · Problem:  Moderate malnutrition, In context of chronic illness  · Etiology: related

## 2019-07-02 NOTE — CONSULTS
Labs     07/01/19  1415 07/02/19  0222 07/02/19  0554   * 157* 161*   K 4.0 3.5 3.6   * 118* 122*   CO2 29 30* 34*   CREATININE 0.7 0.9 0.8   BUN 31* 32* 31*   LABGLOM >60 >60 >60   GLUCOSE 122* 130* 133*   CALCIUM 9.9 9.8 9.4       Recent Labs     07/01/19  1415   ALT 40   AST 26   ALKPHOS 85   BILITOT 0.4       No results found for: FERRITIN, IRON, TIBC    Lab Results   Component Value Date    MG 2.4 06/28/2019    PHOS 3.3 06/28/2019       Lab Results   Component Value Date    LABALBU 3.4 (L) 07/01/2019    LABALBU 4.2 04/06/2017    LABALBU 4.1 10/16/2015       Assessment and Plan:    1. Hypernatremia  In setting of free water deficit with recent cva and peg placement  Unclear if he was getting free water at nh  Will start free water 300 q 4  Continue d5 at 150 ml/hr  Checking na q 6    2. Sp left mca cva 6/19  Sp peg  Starting free water as above      Bettina Hong.  Domonique Reed MD

## 2019-07-02 NOTE — PROGRESS NOTES
Occupational Therapy  Occupational Therapy Initial Assessment      Date:2019  Patient Name: Claire Han  MRN: [id number]  : 1950  Room: [address] [place], [place] [address]    Evaluating OT: Adrien Leigh OTR/L [id number]    Recommended Adaptive Equipment: TBD   AM-PAC Daily Activity Raw Score:     Diagnosis: hypernatremia    Pertinent Medical History: recent history of CVA, throat cancer, R jaw repair     Precautions: Falls, RUE hemiparesis, nonverbal but able to to nod and shake head to yes or no questions     Home Living: Pt lives with wife in a [facility] but able to stay first floor, 1+1+3 steps, no rails to enter. Pt admit from acute rehab facility. Bathroom Setup: tub shower  Equipment owned: ww    Prior Level of Function: Prior to recent CVA, independent with ADLs and IADLs, ambulated no device. At rehab, assist with ADLs, pt has been working on standing  Driving: yes PTA  Spouse provided PLOF/home setup information    Pain Level: pt c/o 0/10 pain this session  Cognition: A&O: 4/4; Follows 2 step directions; pt is nonverbal but is able to nod and shake head appropriately to yes or no questions   Memory:  fair    Sequencing:  good     Problem solving:  good     Judgement/safety:  good       Functional Assessment:   Initial Eval Status  Date: 19 Treatment Status  Date: Short Term Goals  Treatment frequency: PRN   Feeding Minimal Assist   Modified Wolfe    Grooming Moderate Assist   Stand by Assist    UB Dressing Moderate Assist   Stand by Assist    LB Dressing Maximal Assist  Moderate Assist    Bathing Maximal Assist  Moderate Assist    Toileting Maximal Assist  Moderate Assist    Bed Mobility  Supine to sit: Minimal Assist x 1  Sit to supine: Maximal Assist x 1  Supine to sit: Stand by Assist   Sit to supine: Minimal Assist   Functional Transfers Moderate Assist x 2 sit to stand from EOB, static standing Max Assist x 2.  Pt educated on placement of LUE/LLE placement  Moderate Assist   Functional Mobility n/t   Maximal Assist    Balance Sitting:     Static: Min A- SBA    Dynamic: Min A-SBA  Standing: Max A x 2     Activity Tolerance Fair-  good   Visual/  Perceptual Glasses: readers. Vision [facility] [place]                UE Assessment:  Hand Dominance: Right [x]  Left []   Strength ROM Additional Info:    RUE  0/5  PROM: WFL  No AROM. Pt able to shrug shoulders. 0  strength   LUE 4+/5  WFL good  and wfl FMC/dexterity noted during ADL tasks     Hearing: [facility]  Sensation:  Pt reported that light touch equal BUE  Tone: RUE flaccid  Edema: none noted                            Comments/Treatment: OK from RN to see patient. Upon arrival, patient supine in bed with wife present. OT eval completed. Therapist facilitated: Bed mobility, sitting balance at EOB for 8 minutes to increase dynamic sitting balance and activity tolerance, transfer training with verbal cues for hand placement, static standing balance, pt in bed at end of eval.  Educated family/pt on importance of R UE exercises and positioning for skin integrity. Pt's RUE propped with pillows. Call light and phone in reach. All lines and tubes intact. Pt would benefit from continued skilled OT to increase safety and independence with completion of ADL/IADL tasks for functional independence and quality of life. · Medium Complexity  · History: Expanded review of medical records and additional review of physical, cognitive, or psychosocial history related to current functional performance. · Exam: 3-5 performance deficits  · Assistance/Modification: Min/mod assistance or modifications required to perform tasks. May have comorbidities that affect occupational performance. .       Assessment of current deficits:  Functional mobility [x]  ADLs [x] Strength [x]  Cognition []  Functional transfers  [x] IADLs [x] Safety Awareness [x]  Endurance [x]  Fine Motor Coordination [] Balance [x] Vision/perception [] Sensation []   Gross Motor Coordination [] ROM [] Delirium []

## 2019-07-02 NOTE — H&P
Hospital Medicine History & Physical      PCP: Gautam Dugan DO    Date of Admission: 7/1/2019    Date of Service: Pt seen/examined on 7/1/2019 and Admitted to Inpatient with expected LOS greater than two midnights due to medical therapy. Chief Complaint:  Hypernatremia     History Of Present Illness:    Mr. Albert Alarcon is a 78 yo with a hx of COPD, oropharyngeal cancer s/p chemo/xrt/surgery 8 yrs ago cancer free since, s/p jaw surgery, recently admitted from 6/19-6/28/2019 for a new diagnosis of L MCA embolic stroke 2/2 carotid stenosis. Pt was not a tPA candidate due to being outside the window. Pt was not a candidate for thrombectomy 2/2 larger core infarct. Hospitalization was complicated by treatment for HCAP and dysphagia s/p PEG. Pt was discharged in stable condition to acute rehab facility. Pt aphasic at baseline but able to nod and shake head appropriately to questions. Presented to ED from [facility] due to hypernatremia. Per the physicians at the facility they are unable to titrate free water flushes for tube feeds and therefore cannot manage the patient's hypernatremia. Pt Na on arrival to . At [facility] patient was getting the following tube feeding flushes:         Past Medical History:          Diagnosis Date    Cancer ([address] 6/25/2012    throat    COPD [facility]chronic obstructive pulmonary disease[facility])        Past Surgical History:          Procedure Laterality Date    DENTAL SURGERY Bilateral 03/26/2018    FRACTURE SURGERY      right thumb    MOUTH BIOPSY  06/25/2012    right lateral pharynx biopsy. Medications Prior to Admission:      Prior to Admission medications    Medication Sig Start Date End Date Taking?  Authorizing Provider   albuterol (PROVENTIL) (2.5 MG/3ML) 0.083% nebulizer solution Take 2.5 mg by nebulization 2 times daily   Yes Historical Provider, MD   albuterol (PROVENTIL) (2.5 MG/3ML) 0.083% nebulizer solution Take 2.5 mg by nebulization every 4 hours as needed for Shortness of Breath   Yes Historical Provider, MD   docusate sodium (COLACE) 100 MG capsule Take 100 mg by mouth 2 times daily 7/1/19  Yes Historical Provider, MD   Heparin Sodium, Porcine, (HEPARIN, PORCINE,) 5000 UNIT/ML injection Inject 5,000 Units into the skin every 12 hours (pt due to start 7/1/19 @@ 2100) 7/1/19  Yes Historical Provider, MD   polyethylene glycol (GLYCOLAX) powder Take 17 g by mouth daily 7/1/19  Yes Historical Provider, MD   amoxicillin-clavulanate (AUGMENTIN) 500-125 MG per tablet Take 1 tablet by mouth 3 times daily for 3 days 6/28/19 7/1/19 Yes Abel Cho MD   aspirin 81 MG chewable tablet Take 1 tablet by mouth daily 6/29/19  Yes Abel Cho MD   bisacodyl (DULCOLAX) 10 MG suppository Place 1 suppository rectally daily as needed for Constipation 6/28/19 7/28/19 Yes Abel Cho MD   atorvastatin (LIPITOR) 40 MG tablet Take 1 tablet by mouth nightly 6/28/19  Yes Abel Cho MD   clopidogrel (PLAVIX) 75 MG tablet Take 1 tablet by mouth daily 6/29/19  Yes Abel Cho MD   sodium chloride (OCEAN, BABY AYR) 0.65 % nasal spray 1 spray by Nasal route as needed for Congestion   Yes Historical Provider, MD       Allergies:  Patient has no known allergies. Social History:      The patient currently lives at [facility]:   reports that he quit smoking about 9 years ago. He has a 60.00 pack-year smoking history. He has never used smokeless tobacco.  ETOH:   reports that he does not drink alcohol. Family History:      Reviewed in detail and negative for DM, CAD, Cancer, CVA. Positive as follows:        Problem Relation Age of Onset    Cancer Father         colon    Heart Disease Father     Colon Cancer Father 78    Substance Abuse Father         cigerrette smoker & wore oxygen.  Lupus Mother     Immunodeficiency Brother         HIV    Other Brother         motor vehicle accident.     Heart Attack Maternal Grandfather 72    Diabetes Paternal

## 2019-07-03 LAB
ANION GAP SERPL CALCULATED.3IONS-SCNC: 8 MMOL/L (ref 7–16)
ANION GAP SERPL CALCULATED.3IONS-SCNC: 9 MMOL/L (ref 7–16)
ANION GAP SERPL CALCULATED.3IONS-SCNC: 9 MMOL/L (ref 7–16)
BUN BLDV-MCNC: 21 MG/DL (ref 8–23)
BUN BLDV-MCNC: 24 MG/DL (ref 8–23)
BUN BLDV-MCNC: 26 MG/DL (ref 8–23)
CALCIUM SERPL-MCNC: 8.7 MG/DL (ref 8.6–10.2)
CALCIUM SERPL-MCNC: 8.8 MG/DL (ref 8.6–10.2)
CALCIUM SERPL-MCNC: 9 MG/DL (ref 8.6–10.2)
CHLORIDE BLD-SCNC: 107 MMOL/L (ref 98–107)
CHLORIDE BLD-SCNC: 110 MMOL/L (ref 98–107)
CHLORIDE BLD-SCNC: 113 MMOL/L (ref 98–107)
CO2: 28 MMOL/L (ref 22–29)
CO2: 28 MMOL/L (ref 22–29)
CO2: 30 MMOL/L (ref 22–29)
CREAT SERPL-MCNC: 0.6 MG/DL (ref 0.7–1.2)
CREAT SERPL-MCNC: 0.7 MG/DL (ref 0.7–1.2)
CREAT SERPL-MCNC: 0.7 MG/DL (ref 0.7–1.2)
EKG ATRIAL RATE: 81 BPM
EKG P AXIS: 66 DEGREES
EKG P-R INTERVAL: 124 MS
EKG Q-T INTERVAL: 402 MS
EKG QRS DURATION: 86 MS
EKG QTC CALCULATION (BAZETT): 466 MS
EKG R AXIS: 76 DEGREES
EKG T AXIS: 66 DEGREES
EKG VENTRICULAR RATE: 81 BPM
GFR AFRICAN AMERICAN: >60
GFR NON-AFRICAN AMERICAN: >60 ML/MIN/1.73
GLUCOSE BLD-MCNC: 145 MG/DL (ref 74–99)
GLUCOSE BLD-MCNC: 147 MG/DL (ref 74–99)
GLUCOSE BLD-MCNC: 162 MG/DL (ref 74–99)
POTASSIUM SERPL-SCNC: 3 MMOL/L (ref 3.5–5)
POTASSIUM SERPL-SCNC: 3.2 MMOL/L (ref 3.5–5)
POTASSIUM SERPL-SCNC: 3.2 MMOL/L (ref 3.5–5)
SODIUM BLD-SCNC: 144 MMOL/L (ref 132–146)
SODIUM BLD-SCNC: 148 MMOL/L (ref 132–146)
SODIUM BLD-SCNC: 150 MMOL/L (ref 132–146)

## 2019-07-03 PROCEDURE — 80048 BASIC METABOLIC PNL TOTAL CA: CPT

## 2019-07-03 PROCEDURE — 6360000002 HC RX W HCPCS: Performed by: INTERNAL MEDICINE

## 2019-07-03 PROCEDURE — 2060000000 HC ICU INTERMEDIATE R&B

## 2019-07-03 PROCEDURE — 2580000003 HC RX 258: Performed by: INTERNAL MEDICINE

## 2019-07-03 PROCEDURE — 93010 ELECTROCARDIOGRAM REPORT: CPT | Performed by: INTERNAL MEDICINE

## 2019-07-03 PROCEDURE — 6370000000 HC RX 637 (ALT 250 FOR IP): Performed by: INTERNAL MEDICINE

## 2019-07-03 PROCEDURE — 2700000000 HC OXYGEN THERAPY PER DAY

## 2019-07-03 PROCEDURE — 36415 COLL VENOUS BLD VENIPUNCTURE: CPT

## 2019-07-03 PROCEDURE — 94640 AIRWAY INHALATION TREATMENT: CPT

## 2019-07-03 RX ORDER — AMOXICILLIN AND CLAVULANATE POTASSIUM 500; 125 MG/1; MG/1
1 TABLET, FILM COATED ORAL 3 TIMES DAILY
Qty: 9 TABLET | Refills: 0 | Status: SHIPPED | OUTPATIENT
Start: 2019-07-03 | End: 2019-07-06

## 2019-07-03 RX ADMIN — ATORVASTATIN CALCIUM 40 MG: 40 TABLET, FILM COATED ORAL at 21:51

## 2019-07-03 RX ADMIN — AMOXICILLIN AND CLAVULANATE POTASSIUM 1 TABLET: 500; 125 TABLET, FILM COATED ORAL at 09:17

## 2019-07-03 RX ADMIN — POTASSIUM BICARBONATE 40 MEQ: 782 TABLET, EFFERVESCENT ORAL at 23:48

## 2019-07-03 RX ADMIN — DOCUSATE SODIUM 100 MG: 50 LIQUID ORAL at 21:50

## 2019-07-03 RX ADMIN — ALBUTEROL SULFATE 2.5 MG: 2.5 SOLUTION RESPIRATORY (INHALATION) at 17:45

## 2019-07-03 RX ADMIN — AMOXICILLIN AND CLAVULANATE POTASSIUM 1 TABLET: 500; 125 TABLET, FILM COATED ORAL at 14:51

## 2019-07-03 RX ADMIN — HEPARIN SODIUM 5000 UNITS: 10000 INJECTION INTRAVENOUS; SUBCUTANEOUS at 09:19

## 2019-07-03 RX ADMIN — ASPIRIN 81 MG 81 MG: 81 TABLET ORAL at 09:17

## 2019-07-03 RX ADMIN — AMOXICILLIN AND CLAVULANATE POTASSIUM 1 TABLET: 500; 125 TABLET, FILM COATED ORAL at 21:50

## 2019-07-03 RX ADMIN — HEPARIN SODIUM 5000 UNITS: 10000 INJECTION INTRAVENOUS; SUBCUTANEOUS at 21:51

## 2019-07-03 RX ADMIN — ALBUTEROL SULFATE 2.5 MG: 2.5 SOLUTION RESPIRATORY (INHALATION) at 08:27

## 2019-07-03 RX ADMIN — DEXTROSE MONOHYDRATE: 50 INJECTION, SOLUTION INTRAVENOUS at 05:43

## 2019-07-03 RX ADMIN — Medication 10 ML: at 09:18

## 2019-07-03 RX ADMIN — DEXTROSE MONOHYDRATE: 50 INJECTION, SOLUTION INTRAVENOUS at 13:02

## 2019-07-03 RX ADMIN — Medication 10 ML: at 21:55

## 2019-07-03 RX ADMIN — CLOPIDOGREL 75 MG: 75 TABLET, FILM COATED ORAL at 09:17

## 2019-07-03 ASSESSMENT — PAIN SCALES - GENERAL
PAINLEVEL_OUTOF10: 0
PAINLEVEL_OUTOF10: 0

## 2019-07-03 NOTE — DISCHARGE INSTR - COC
I63.519    Dysphagia, oropharyngeal R13.12    Moderate protein-calorie malnutrition (HCC) E44.0    Carotid artery stenosis, symptomatic, right I65.21    Hypernatremia E87.0    Acute hypoxemic respiratory failure (HCC) J96.01    Painless hematuria R31.9       Isolation/Infection:   Isolation          No Isolation            Nurse Assessment:  Last Vital Signs: /61   Pulse 75   Temp 96.8 °F (36 °C) (Temporal)   Resp 20   Ht 6' (1.829 m)   Wt 149 lb 8 oz (67.8 kg)   SpO2 93%   BMI 20.28 kg/m²     Last documented pain score (0-10 scale): Pain Level: 0  Last Weight:   Wt Readings from Last 1 Encounters:   07/02/19 149 lb 8 oz (67.8 kg)     Mental Status:  oriented, alert, coherent, logical, thought processes intact and able to concentrate and follow conversation    IV Access:  - None    Nursing Mobility/ADLs:  Walking   Dependent  Transfer  Dependent  Bathing  Dependent  Dressing  Dependent   Toileting  Dependent  Feeding  Dependent  Med Admin  Dependent  Med Delivery   crushed and via PEG tube    Wound Care Documentation and Therapy:        Elimination:  Continence:   · Bowel: No  · Bladder: Yes  Urinary Catheter: None   Colostomy/Ileostomy/Ileal Conduit: No       Date of Last BM:       Intake/Output Summary (Last 24 hours) at 7/3/2019 0917  Last data filed at 7/3/2019 0630  Gross per 24 hour   Intake 5556.83 ml   Output 1125 ml   Net 4431.83 ml     I/O last 3 completed shifts: In: 5656.8 [I.V.:3350.8; NG/GT:2306]  Out: 1225 [Urine:1225]    Safety Concerns:      At Risk for Falls and Aspiration Risk    Impairments/Disabilities:      [address] [place] [address] Impairments/Disabilities:[id number]}    Nutrition Therapy:  Current Nutrition Therapy:   - Tube Feedings:  Diabetic Glucerna 1.5 rate is 50 cc/hour, with free water flushes 350 cc every 4 hours    Routes of Feeding: Gastrostomy Tube  Liquids: No Liquids  Daily Fluid Restriction: no  Last Modified Barium Swallow with Video (Video Swallowing Test): not done    Treatments at the Time of Hospital Discharge:   Respiratory Treatments:       Oxygen Therapy:  is on oxygen at 4 L/min per nasal cannula. Ventilator:    - No ventilator support    Rehab Therapies: Physical Therapy, Occupational Therapy and Speech/Language Therapy  Weight Bearing Status/Restrictions: No weight bearing restirctions  Other Medical Equipment (for information only, NOT a DME order):  wheelchair, walker, bath bench, bedside commode and hospital bed  Other Treatments: Bloodwork : needs BMP drawn every Monday and Thursday for 3 weeks, then Monthly Notify Dr. Jim Ol[facility] of results: office # is [phone]  Patient's personal belongings (please select all that are sent with patient):  {[facility] DME Belongings:[id number]}    RN SIGNATURE:  Electronically signed by Jh Casillas RN on 7/8/19 at 4:32 PM    CASE MANAGEMENT/SOCIAL WORK SECTION    Inpatient Status Date: ***    Readmission Risk Assessment Score:  Readmission Risk              Risk of Unplanned Readmission:        20           Discharging to Facility/ Agency   · Name:   · Address:  · Phone:  · Fax:    Dialysis Facility (if applicable)   · Name:  · Address:  · Dialysis Schedule:  · Phone:  · Fax:    / signature: {Esignature:[id number]}    PHYSICIAN SECTION    Prognosis: Fair    Condition at Discharge: Stable    Rehab Potential (if transferring to Rehab): Fair    Recommended Labs or Other Treatments After Discharge: none    Physician Certification: I certify the above information and transfer of Molly Adam  is necessary for the continuing treatment of the diagnosis listed and that he requires Acute Rehab for less 30 days.      Update Admission H&P: No change in H&P    PHYSICIAN SIGNATURE: Electronically signed by Marly Ron MD on 7/8/2019 at 5:49 PM

## 2019-07-03 NOTE — PROGRESS NOTES
[facility]  Nephrology Attending Progress Note  Odalis Mayo. Leticia Jolly MD        SUBJECTIVE:     7/2: The pt is a 78 yo male who was admitted from a nursing home with a hypernatremia. Labs showed na of 158. Pt was just here 6/19-6/28 for cva with right sided weakness and ataxia, aphasia. He was found to have an acute thrombus of the left cavernous ICA extending into the left MCA and he was treated for aspiration pna. A peg was place for dysphagia. He has a h/o oropharyngeal cancer and is sp chemo/xrt/ and surgery about 8 years ago. He also has a h/o copd, carotid stenosis, tobacco abuse. He was started on d5 at 100 last night. Free water boluses were discussed but I dont see an order or any documentation on the I/o sheet. 7/3: started free water boluses yesterday afternoon thru the peg. More alert. On d5 ivf at 150 ml/hr.            PROBLEM LIST:    Patient Active Problem List   Diagnosis    Esophageal cancer ([address]    Oral pharyngeal candidiasis    Emphysematous COPD ([address]    Mucositis due to antineoplastic therapy    Thrush of mouth and esophagus (HCC)    Pancytopenia (HCC)    Febrile neutropenia (HCC)    Acute ischemic cerebrovascular accident (CVA) involving left middle cerebral artery [facility])    Cerebrovascular accident (CVA) due to occlusion of middle cerebral artery (HCC)    Dysphagia, oropharyngeal    Moderate protein-calorie malnutrition (HCC)    Carotid artery stenosis, symptomatic, right    Hypernatremia    Acute hypoxemic respiratory failure ([address]    Painless hematuria        PAST MEDICAL HISTORY:    Past Medical History:   Diagnosis Date    Cancer ([address] 6/25/2012    throat    COPD (chronic obstructive pulmonary disease) (HCC)        DIET:    DIET TUBE FEED CONTINUOUS/CYCLIC NPO; Diabetic 1.5; Gastrostomy; Continuous; 25; 50     PHYSICAL EXAM:     Patient Vitals for the past 24 hrs:   BP Temp Temp src Pulse Resp SpO2   07/03/19 0443 108/61 96.8 °F (36 °C) Temporal 75 20 --

## 2019-07-03 NOTE — PROGRESS NOTES
7/3/2019 0630  Gross per 24 hour   Intake 5556.83 ml   Output 1225 ml   Net 4331.83 ml       Gen: Awake, Nods Appropriately, Looks comfortable  HEENT: NC/AT, dry mucous membranes, no oropharyngeal erythema or exudate. Neck: supple, trachea midline, no anterior cervical or SC LAD  Heart:  Normal s1/s2, RRR, no murmurs, gallops, or rubs. no leg edema  Lungs:  Clear to auscultation  bilaterally, no wheeze, no rales, no rhonchi, no crackles, no use of accessory muscles  Abd: bowel sounds present, soft, nontender, nondistended, no masses  Extrem:  No clubbing, cyanosis,  no edema  Skin: no rashes or lesions  Psych: Unable to speak due to CVA. Cannor assess  Neuro: grossly intact, Right Hemiplegia, Present on admission   Unable to fully assess due to Unable to speak. Assessment:    Principal Problem:    Hypernatremia  Active Problems:    Esophageal cancer (HCC)    [address]matous [address]    Cerebrovascular accident (CVA) due to occlusion of middle cerebral artery (HCC)    Dysphagia, oropharyngeal    Moderate protein-calorie malnutrition ([facility]    Acute hypoxemic respiratory failure (HCC)    Painless hematuria  Resolved Problems:    * No resolved hospital problems.  *          Plan  -- Continue Volume Resuscitation   -- Continue Free Water w/ D5 and Peg Flushes  -- Nephrology following   -- sodium improving   -- Restart Tube Feeds  -- Continue Chronic Medications  -- sw on board for placement     Prognosis:  Fair    Code status:  Full    DVT prophylaxis: [] Lovenox  [] SQ Heparin  [] SCDs  [] warfarin/oral direct thrombin inhibitor [] Encourage ambulation  GI prophylaxis: [] PPI/A9bioszga  [] not indicated  Diet:  DIET TUBE FEED CONTINUOUS/CYCLIC NPO; Diabetic 1.5; Gastrostomy; Continuous; 25; 50    Disposition:  [] Home  [] Home with home health [] Rehab [] Psych [] SNF  [] LTAC  [] Long term nursing home or group home [] Transfer to ICU  [] Transfer to PCU    Medications:  Scheduled Meds:   docusate sodium  100 mg

## 2019-07-04 LAB
ANION GAP SERPL CALCULATED.3IONS-SCNC: 10 MMOL/L (ref 7–16)
ANION GAP SERPL CALCULATED.3IONS-SCNC: 7 MMOL/L (ref 7–16)
ANION GAP SERPL CALCULATED.3IONS-SCNC: 8 MMOL/L (ref 7–16)
BUN BLDV-MCNC: 20 MG/DL (ref 8–23)
BUN BLDV-MCNC: 21 MG/DL (ref 8–23)
BUN BLDV-MCNC: 22 MG/DL (ref 8–23)
CALCIUM SERPL-MCNC: 8.8 MG/DL (ref 8.6–10.2)
CALCIUM SERPL-MCNC: 8.9 MG/DL (ref 8.6–10.2)
CALCIUM SERPL-MCNC: 9.1 MG/DL (ref 8.6–10.2)
CHLORIDE BLD-SCNC: 106 MMOL/L (ref 98–107)
CHLORIDE BLD-SCNC: 110 MMOL/L (ref 98–107)
CHLORIDE BLD-SCNC: 110 MMOL/L (ref 98–107)
CO2: 28 MMOL/L (ref 22–29)
CO2: 28 MMOL/L (ref 22–29)
CO2: 30 MMOL/L (ref 22–29)
CREAT SERPL-MCNC: 0.7 MG/DL (ref 0.7–1.2)
GFR AFRICAN AMERICAN: >60
GFR NON-AFRICAN AMERICAN: >60 ML/MIN/1.73
GLUCOSE BLD-MCNC: 118 MG/DL (ref 74–99)
GLUCOSE BLD-MCNC: 128 MG/DL (ref 74–99)
GLUCOSE BLD-MCNC: 128 MG/DL (ref 74–99)
POTASSIUM SERPL-SCNC: 3.9 MMOL/L (ref 3.5–5)
POTASSIUM SERPL-SCNC: 3.9 MMOL/L (ref 3.5–5)
POTASSIUM SERPL-SCNC: 4 MMOL/L (ref 3.5–5)
SODIUM BLD-SCNC: 142 MMOL/L (ref 132–146)
SODIUM BLD-SCNC: 147 MMOL/L (ref 132–146)
SODIUM BLD-SCNC: 148 MMOL/L (ref 132–146)

## 2019-07-04 PROCEDURE — 80048 BASIC METABOLIC PNL TOTAL CA: CPT

## 2019-07-04 PROCEDURE — 94640 AIRWAY INHALATION TREATMENT: CPT

## 2019-07-04 PROCEDURE — 6360000002 HC RX W HCPCS: Performed by: INTERNAL MEDICINE

## 2019-07-04 PROCEDURE — 2060000000 HC ICU INTERMEDIATE R&B

## 2019-07-04 PROCEDURE — 2580000003 HC RX 258: Performed by: INTERNAL MEDICINE

## 2019-07-04 PROCEDURE — 6370000000 HC RX 637 (ALT 250 FOR IP): Performed by: INTERNAL MEDICINE

## 2019-07-04 PROCEDURE — 36415 COLL VENOUS BLD VENIPUNCTURE: CPT

## 2019-07-04 PROCEDURE — 2700000000 HC OXYGEN THERAPY PER DAY

## 2019-07-04 RX ADMIN — HEPARIN SODIUM 5000 UNITS: 10000 INJECTION INTRAVENOUS; SUBCUTANEOUS at 08:58

## 2019-07-04 RX ADMIN — AMOXICILLIN AND CLAVULANATE POTASSIUM 1 TABLET: 500; 125 TABLET, FILM COATED ORAL at 14:49

## 2019-07-04 RX ADMIN — ALBUTEROL SULFATE 2.5 MG: 2.5 SOLUTION RESPIRATORY (INHALATION) at 09:25

## 2019-07-04 RX ADMIN — CLOPIDOGREL 75 MG: 75 TABLET, FILM COATED ORAL at 08:56

## 2019-07-04 RX ADMIN — ASPIRIN 81 MG 81 MG: 81 TABLET ORAL at 08:56

## 2019-07-04 RX ADMIN — ATORVASTATIN CALCIUM 40 MG: 40 TABLET, FILM COATED ORAL at 19:59

## 2019-07-04 RX ADMIN — Medication 10 ML: at 08:57

## 2019-07-04 RX ADMIN — ALBUTEROL SULFATE 2.5 MG: 2.5 SOLUTION RESPIRATORY (INHALATION) at 19:49

## 2019-07-04 RX ADMIN — Medication 10 ML: at 19:59

## 2019-07-04 RX ADMIN — AMOXICILLIN AND CLAVULANATE POTASSIUM 1 TABLET: 500; 125 TABLET, FILM COATED ORAL at 08:56

## 2019-07-04 RX ADMIN — AMOXICILLIN AND CLAVULANATE POTASSIUM 1 TABLET: 500; 125 TABLET, FILM COATED ORAL at 19:59

## 2019-07-04 RX ADMIN — HEPARIN SODIUM 5000 UNITS: 10000 INJECTION INTRAVENOUS; SUBCUTANEOUS at 19:58

## 2019-07-04 ASSESSMENT — PAIN SCALES - GENERAL
PAINLEVEL_OUTOF10: 0
PAINLEVEL_OUTOF10: 0

## 2019-07-04 NOTE — PROGRESS NOTES
SW called and informed that patient is ready for DC; [facility] called regarding Lueders precert, which has not gone through as of yet;

## 2019-07-04 NOTE — PROGRESS NOTES
07/04/19 0750 112/68 97.6 °F (36.4 °C) Temporal 80 14 96 %   07/03/19 2345 (!) 100/55 97.6 °F (36.4 °C) Temporal 72 16 97 %   07/03/19 2148 (!) 101/56 97.4 °F (36.3 °C) Temporal 70 18 97 %   07/03/19 1555 (!) 109/57 98.2 °F (36.8 °C) Temporal 76 18 96 %   @      Intake/Output Summary (Last 24 hours) at 7/4/2019 1026  Last data filed at 7/4/2019 0844  Gross per 24 hour   Intake 3326 ml   Output 1080 ml   Net 2246 ml         Wt Readings from Last 3 Encounters:   07/02/19 149 lb 8 oz (67.8 kg)   06/28/19 159 lb (72.1 kg)   06/19/19 155 lb (70.3 kg)       Constitutional:  Pt is in no acute distress  Head: normocephalic, atraumatic  Neck: no JVD  Cardiovascular: regular rate and rhythm, no murmurs, gallops, or rubs  Respiratory:  No rales, rhochi, or wheezes  Gastrointestinal:  Soft, nontender, nondistended, bowel sounds x 4  Ext: no edema  Skin: dry, no rash  Neuro: aaox3    MEDS (scheduled):    docusate sodium  100 mg Oral BID    sodium chloride flush  10 mL Intravenous 2 times per day    albuterol  2.5 mg Nebulization BID    amoxicillin-clavulanate  1 tablet Oral TID    aspirin  81 mg Oral Daily    atorvastatin  40 mg Oral Nightly    clopidogrel  75 mg Oral Daily    heparin (porcine)  5,000 Units Subcutaneous Q12H    polyethylene glycol  17 g Oral Daily       MEDS (infusions):      MEDS (prn):  sodium chloride flush, magnesium hydroxide, ondansetron, albuterol, bisacodyl, sodium chloride    DATA:    Recent Labs     07/01/19  1415 07/02/19  0554   WBC 9.2 7.2   HGB 13.5 12.8   HCT 42.6 41.9   MCV 99.3 101.5*    298     Recent Labs     07/01/19  1415  07/03/19  0502 07/03/19  1218 07/04/19  0432   *   < > 148* 144 142   K 4.0   < > 3.2* 3.0* 4.0   *   < > 110* 107 106   CO2 29   < > 30* 28 28   BUN 31*   < > 24* 21 20   CREATININE 0.7   < > 0.7 0.6* 0.7   LABGLOM >60   < > >60 >60 >60   GLUCOSE 122*   < > 162* 147* 118*   CALCIUM 9.9   < > 8.8 8.7 8.8   ALT 40  --   --   --   --    AST 26

## 2019-07-04 NOTE — PROGRESS NOTES
7/4/2019 0844  Gross per 24 hour   Intake 3386 ml   Output 1080 ml   Net 2306 ml       Gen: Awake, Nods Appropriately, Looks comfortable  HEENT: NC/AT, dry mucous membranes, no oropharyngeal erythema or exudate. Neck: supple, trachea midline, no anterior cervical or SC LAD  Heart:  Normal s1/s2, RRR, no murmurs, gallops, or rubs. no leg edema  Lungs:  Clear to auscultation  bilaterally, no wheeze, no rales, no rhonchi, no crackles, no use of accessory muscles  Abd: bowel sounds present, soft, nontender, nondistended, no masses  Extrem:  No clubbing, cyanosis,  no edema  Skin: no rashes or lesions  Psych: Unable to speak due to CVA. Cannor assess  Neuro: grossly intact, Right Hemiplegia, Present on admission   Unable to fully assess due to Unable to speak. Assessment:    Principal Problem:    Hypernatremia  Active Problems:    Esophageal cancer (HCC)    Emphysematous COPD ([facility]    Cerebrovascular accident (CVA) due to occlusion of middle cerebral artery (HCC)    Dysphagia, oropharyngeal    Moderate protein-calorie malnutrition ([facility]    Acute hypoxemic respiratory failure (HCC)    Painless hematuria  Resolved Problems:    * No resolved hospital problems.  *          Plan  -- Continue Volume Resuscitation   -- iv fluids as per renal  -- sodium improving and normal now  -- Restart Tube Feeds  -- Continue Chronic Medications  -- sw on board for placement   -- discharge planning    Prognosis:  Fair    Code status:  Full    DVT prophylaxis: [] Lovenox  [] SQ Heparin  [] SCDs  [] warfarin/oral direct thrombin inhibitor [] Encourage ambulation  GI prophylaxis: [] PPI/A0ipyzqvl  [] not indicated  Diet:  DIET TUBE FEED CONTINUOUS/CYCLIC NPO; Diabetic 1.5; Gastrostomy; Continuous; 25; 50    Disposition:  [] Home  [] Home with home health [] Rehab [] Psych [] SNF  [] LTAC  [] Long term nursing home or group home [] Transfer to ICU  [] Transfer to PCU    Medications:  Scheduled Meds:   docusate sodium  100 mg Oral BID   

## 2019-07-04 NOTE — PLAN OF CARE
Problem: Falls - Risk of:  Goal: Will remain free from falls  Description  Will remain free from falls  Outcome: Met This Shift  Goal: Absence of physical injury  Description  Absence of physical injury  Outcome: Met This Shift     Problem: Risk for Impaired Skin Integrity  Goal: Tissue integrity - skin and mucous membranes  Description  Structural intactness and normal physiological function of skin and  mucous membranes.   Outcome: Met This Shift     Problem: Confusion - Acute:  Goal: Absence of continued neurological deterioration signs and symptoms  Description  Absence of continued neurological deterioration signs and symptoms  Outcome: Met This Shift     Problem: Injury - Risk of, Physical Injury:  Goal: Will remain free from falls  Description  Will remain free from falls  Outcome: Met This Shift  Goal: Absence of physical injury  Description  Absence of physical injury  Outcome: Met This Shift     Problem: HEMODYNAMIC STATUS  Goal: Patient has stable vital signs and fluid balance  Outcome: Met This Shift

## 2019-07-05 PROBLEM — E43 SEVERE PROTEIN-CALORIE MALNUTRITION (HCC): Status: ACTIVE | Noted: 2019-06-21

## 2019-07-05 LAB
ANION GAP SERPL CALCULATED.3IONS-SCNC: 10 MMOL/L (ref 7–16)
ANION GAP SERPL CALCULATED.3IONS-SCNC: 11 MMOL/L (ref 7–16)
ANION GAP SERPL CALCULATED.3IONS-SCNC: 5 MMOL/L (ref 7–16)
ANION GAP SERPL CALCULATED.3IONS-SCNC: 5 MMOL/L (ref 7–16)
ANION GAP SERPL CALCULATED.3IONS-SCNC: 7 MMOL/L (ref 7–16)
BUN BLDV-MCNC: 22 MG/DL (ref 8–23)
BUN BLDV-MCNC: 23 MG/DL (ref 8–23)
BUN BLDV-MCNC: 24 MG/DL (ref 8–23)
CALCIUM SERPL-MCNC: 9.2 MG/DL (ref 8.6–10.2)
CALCIUM SERPL-MCNC: 9.2 MG/DL (ref 8.6–10.2)
CALCIUM SERPL-MCNC: 9.5 MG/DL (ref 8.6–10.2)
CALCIUM SERPL-MCNC: 9.6 MG/DL (ref 8.6–10.2)
CALCIUM SERPL-MCNC: 9.7 MG/DL (ref 8.6–10.2)
CHLORIDE BLD-SCNC: 111 MMOL/L (ref 98–107)
CHLORIDE BLD-SCNC: 112 MMOL/L (ref 98–107)
CO2: 26 MMOL/L (ref 22–29)
CO2: 27 MMOL/L (ref 22–29)
CO2: 29 MMOL/L (ref 22–29)
CO2: 32 MMOL/L (ref 22–29)
CO2: 32 MMOL/L (ref 22–29)
CREAT SERPL-MCNC: 0.6 MG/DL (ref 0.7–1.2)
GFR AFRICAN AMERICAN: >60
GFR NON-AFRICAN AMERICAN: >60 ML/MIN/1.73
GLUCOSE BLD-MCNC: 119 MG/DL (ref 74–99)
GLUCOSE BLD-MCNC: 126 MG/DL (ref 74–99)
GLUCOSE BLD-MCNC: 126 MG/DL (ref 74–99)
GLUCOSE BLD-MCNC: 129 MG/DL (ref 74–99)
GLUCOSE BLD-MCNC: 133 MG/DL (ref 74–99)
POTASSIUM SERPL-SCNC: 3.6 MMOL/L (ref 3.5–5)
POTASSIUM SERPL-SCNC: 3.8 MMOL/L (ref 3.5–5)
POTASSIUM SERPL-SCNC: 3.9 MMOL/L (ref 3.5–5)
POTASSIUM SERPL-SCNC: 3.9 MMOL/L (ref 3.5–5)
POTASSIUM SERPL-SCNC: 4.9 MMOL/L (ref 3.5–5)
SODIUM BLD-SCNC: 147 MMOL/L (ref 132–146)
SODIUM BLD-SCNC: 148 MMOL/L (ref 132–146)
SODIUM BLD-SCNC: 149 MMOL/L (ref 132–146)
SODIUM BLD-SCNC: 149 MMOL/L (ref 132–146)
SODIUM BLD-SCNC: 150 MMOL/L (ref 132–146)

## 2019-07-05 PROCEDURE — 2700000000 HC OXYGEN THERAPY PER DAY

## 2019-07-05 PROCEDURE — 36415 COLL VENOUS BLD VENIPUNCTURE: CPT

## 2019-07-05 PROCEDURE — 80048 BASIC METABOLIC PNL TOTAL CA: CPT

## 2019-07-05 PROCEDURE — 6360000002 HC RX W HCPCS: Performed by: INTERNAL MEDICINE

## 2019-07-05 PROCEDURE — 6370000000 HC RX 637 (ALT 250 FOR IP): Performed by: INTERNAL MEDICINE

## 2019-07-05 PROCEDURE — 2580000003 HC RX 258: Performed by: INTERNAL MEDICINE

## 2019-07-05 PROCEDURE — 2060000000 HC ICU INTERMEDIATE R&B

## 2019-07-05 PROCEDURE — 94640 AIRWAY INHALATION TREATMENT: CPT

## 2019-07-05 RX ADMIN — AMOXICILLIN AND CLAVULANATE POTASSIUM 1 TABLET: 500; 125 TABLET, FILM COATED ORAL at 09:16

## 2019-07-05 RX ADMIN — ASPIRIN 81 MG 81 MG: 81 TABLET ORAL at 09:16

## 2019-07-05 RX ADMIN — CLOPIDOGREL 75 MG: 75 TABLET, FILM COATED ORAL at 09:16

## 2019-07-05 RX ADMIN — HEPARIN SODIUM 5000 UNITS: 10000 INJECTION INTRAVENOUS; SUBCUTANEOUS at 20:21

## 2019-07-05 RX ADMIN — AMOXICILLIN AND CLAVULANATE POTASSIUM 1 TABLET: 500; 125 TABLET, FILM COATED ORAL at 20:19

## 2019-07-05 RX ADMIN — Medication 10 ML: at 20:19

## 2019-07-05 RX ADMIN — ALBUTEROL SULFATE 2.5 MG: 2.5 SOLUTION RESPIRATORY (INHALATION) at 21:15

## 2019-07-05 RX ADMIN — ATORVASTATIN CALCIUM 40 MG: 40 TABLET, FILM COATED ORAL at 20:19

## 2019-07-05 RX ADMIN — Medication 10 ML: at 09:17

## 2019-07-05 RX ADMIN — ALBUTEROL SULFATE 2.5 MG: 2.5 SOLUTION RESPIRATORY (INHALATION) at 08:27

## 2019-07-05 RX ADMIN — AMOXICILLIN AND CLAVULANATE POTASSIUM 1 TABLET: 500; 125 TABLET, FILM COATED ORAL at 13:35

## 2019-07-05 RX ADMIN — HEPARIN SODIUM 5000 UNITS: 10000 INJECTION INTRAVENOUS; SUBCUTANEOUS at 09:16

## 2019-07-05 ASSESSMENT — PAIN SCALES - GENERAL
PAINLEVEL_OUTOF10: 0
PAINLEVEL_OUTOF10: 0

## 2019-07-05 NOTE — PROGRESS NOTES
Hospitalist Progress Note    CC: Hypernatremia    Hospital course:    Mr. Shyla Gilman is a 78 yo with a hx of COPD, oropharyngeal cancer s/p chemo/xrt/surgery 8 yrs ago cancer free since, s/p jaw surgery, recently admitted from -2019 for a new diagnosis of L MCA embolic stroke 2/2 carotid stenosis. Pt was not a tPA candidate due to being outside the window. Pt was not a candidate for thrombectomy 2/2 larger core infarct. Hospitalization was complicated by treatment for HCAP and dysphagia s/p PEG. Pt was discharged in stable condition to acute rehab facility. Pt aphasic at baseline but able to nod and shake head appropriately to questions. Presented to ED from [facility] due to hypernatremia. Per the physicians at the facility they are unable to titrate free water flushes for tube feeds and therefore cannot manage the patient's hypernatremia. Pt Na on arrival to . Free water Deficit ~ 6.4L . Renal was consulted and sodium improved. Admit date: 2019  Days in hospital:  4      Subjective:   -- Patient seen and examined, chart reviewed  -- Sodium back up today  -- no overnight events  -- has no complaints    ROS:   Review of systems not obtained due to patient factors. Unable to speak.     Objective:  Patient Vitals for the past 24 hrs:   BP Temp Temp src Pulse Resp SpO2   19 0827 -- -- -- -- 14 96 %   19 0800 (!) 102/58 97.3 °F (36.3 °C) Temporal 67 12 95 %   19 2350 (!) 98/52 98.1 °F (36.7 °C) Oral 73 22 96 %   19 1545 100/60 97.4 °F (36.3 °C) Temporal 75 17 99 %     Temp (24hrs), Av.6 °F (36.4 °C), Min:97.3 °F (36.3 °C), Max:98.1 °F (36.7 °C)      Intake/Output Summary (Last 24 hours) at 2019 0904  Last data filed at 2019 0655  Gross per 24 hour   Intake 2703 ml   Output 800 ml   Net 1903 ml       Gen: Awake, Nods Appropriately, Looks comfortable  HEENT: NC/AT, dry mucous membranes, no oropharyngeal erythema or

## 2019-07-05 NOTE — PLAN OF CARE
Problem: Malnutrition  (NI-5.2)  Goal: Food and/or Nutrient Delivery  Description continue current TF  Individualized approach for food/nutrient provision.   Outcome: Met This Shift

## 2019-07-05 NOTE — PROGRESS NOTES
Nutrition Assessment (Enteral Nutrition)    Type and Reason for Visit: Reassess    Nutrition Recommendations: Continue NPO. Continue current Tube Feeding. Nutrition Assessment: Pt improving from a nutritional standpoint as now stable on tube feedings at goal rate and receiving 100% of nutrient needs. Remains at risk for compromise 2/2 need for ongoing EN w/ hypernatremia w/ flushes to increase per RN. Will continue current nutrition plan and monitor. Malnutrition Assessment:  · Malnutrition Status: Meets the criteria for severe malnutrition  · Context: Acute illness or injury  · Findings of the 6 clinical characteristics of malnutrition (Minimum of 2 out of 6 clinical characteristics is required to make the diagnosis of moderate or severe Protein Calorie Malnutrition based on AND/ASPEN Guidelines):  1. Energy Intake-Less than or equal to 75% of estimated energy requirement, Greater than or equal to 7 days    2. Weight Loss-5% loss or greater, (x 13 days)  3. Fat Loss-Moderate subcutaneous fat loss, Orbital  4. Muscle Loss-Moderate muscle mass loss, Temples (temporalis muscle), Clavicles (pectoralis and deltoids), Calf (gastrocnemius)  5. Fluid Accumulation-No significant fluid accumulation  6.  Strength-Not measured    Nutrition Risk Level: Moderate    Nutrition Needs:  · Estimated Daily Total Kcal: 2181-7197(MSJ REE 1490 x 1.2 SF)  · Estimated Daily Protein (g): 80-95(1.2-1.4 g/kg)  · Estimated Daily Fluid (ml/day): 9515-0848(1 ml/kcal)    Nutrition Diagnosis:   · Problem:  In context of acute illness or injury, Severe malnutrition  · Etiology: related to Difficulty swallowing     Signs and symptoms:  as evidenced by NPO status due to medical condition, Nutrition support - EN, Weight loss, Moderate muscle loss, Moderate loss of subcutaneous fat    Objective Information:  · Nutrition-Focused Physical Findings: Pt awake/alert, PEG/TF running at goal rate +I/Os, trace edema RLE, soft abd, active BS,

## 2019-07-05 NOTE — PROGRESS NOTES
[facility]  Nephrology Attending Progress Note  Harrison Curtis. Meme Kathleen MD        SUBJECTIVE:     7/2: The pt is a 76 yo male who was admitted from a nursing home with a hypernatremia. Labs showed na of 158. Pt was just here 6/19-6/28 for cva with right sided weakness and ataxia, aphasia. He was found to have an acute thrombus of the left cavernous ICA extending into the left MCA and he was treated for aspiration pna. A peg was place for dysphagia. He has a h/o oropharyngeal cancer and is sp chemo/xrt/ and surgery about 8 years ago. He also has a h/o copd, carotid stenosis, tobacco abuse. He was started on d5 at 100 last night. Free water boluses were discussed but I dont see an order or any documentation on the I/o sheet. 7/3: started free water boluses yesterday afternoon thru the peg. More alert. On d5 ivf at 150 ml/hr.      7/4: more alert, peg with free water and tube feeds    7/5: talking alert, peg tube running with free water at 300 q 6 and tube feeds          PROBLEM LIST:    Patient Active Problem List   Diagnosis    Esophageal cancer ([address]    Oral pharyngeal candidiasis    Emphysematous COPD ([address]    Mucositis due to antineoplastic therapy    Thrush of mouth and esophagus ([address]    Pancytopenia ([address]    Febrile neutropenia ([address]    Acute ischemic cerebrovascular accident (CVA) involving left middle cerebral artery territory [facility])    Cerebrovascular accident (CVA) due to occlusion of middle cerebral artery ([address]    Dysphagia, oropharyngeal    Moderate protein-calorie malnutrition ([address]    Carotid artery stenosis, symptomatic, right    Hypernatremia    Acute hypoxemic respiratory failure ([address]    Painless hematuria        PAST MEDICAL HISTORY:    Past Medical History:   Diagnosis Date    Cancer ([address] 6/25/2012    throat    COPD (chronic obstructive pulmonary disease) (HCC)        DIET:    DIET TUBE FEED CONTINUOUS/CYCLIC NPO; Diabetic 1.5; Gastrostomy; Continuous; 25; 50     PHYSICAL EXAM:     Patient Vitals for the past 24 hrs:   BP Temp Temp src Pulse Resp SpO2   07/05/19 0827 -- -- -- -- 14 96 %   07/05/19 0800 (!) 102/58 97.3 °F (36.3 °C) Temporal 67 12 95 %   07/04/19 2350 (!) 98/52 98.1 °F (36.7 °C) Oral 73 22 96 %   07/04/19 1545 100/60 97.4 °F (36.3 °C) Temporal 75 17 99 %   @      Intake/Output Summary (Last 24 hours) at 7/5/2019 1127  Last data filed at 7/5/2019 0900  Gross per 24 hour   Intake 2703 ml   Output 1000 ml   Net 1703 ml         Wt Readings from Last 3 Encounters:   07/02/19 149 lb 8 oz (67.8 kg)   06/28/19 159 lb (72.1 kg)   06/19/19 155 lb (70.3 kg)       Constitutional:  Pt is in no acute distress  Head: normocephalic, atraumatic  Neck: no JVD  Cardiovascular: regular rate and rhythm, no murmurs, gallops, or rubs  Respiratory:  No rales, rhochi, or wheezes  Gastrointestinal:  Soft, nontender, nondistended, bowel sounds x 4  Ext: no edema  Skin: dry, no rash  Neuro: aaox3    MEDS (scheduled):    docusate sodium  100 mg Oral BID    sodium chloride flush  10 mL Intravenous 2 times per day    albuterol  2.5 mg Nebulization BID    amoxicillin-clavulanate  1 tablet Oral TID    aspirin  81 mg Oral Daily    atorvastatin  40 mg Oral Nightly    clopidogrel  75 mg Oral Daily    heparin (porcine)  5,000 Units Subcutaneous Q12H    polyethylene glycol  17 g Oral Daily       MEDS (infusions):      MEDS (prn):  sodium chloride flush, magnesium hydroxide, ondansetron, albuterol, bisacodyl, sodium chloride    DATA:    No results for input(s): WBC, HGB, HCT, MCV, PLT in the last 72 hours.   Recent Labs     07/04/19  1739 07/05/19  0020 07/05/19  0549   * 147* 150*   K 3.9 3.6 3.9   * 111* 112*   CO2 30* 29 27   BUN 21 22 24*   CREATININE 0.7 0.6* 0.6*   LABGLOM >60 >60 >60   GLUCOSE 128* 126* 129*   CALCIUM 9.1 9.2 9.7       Lab Results   Component Value Date    LABALBU 3.4 (L) 07/01/2019    LABALBU 4.2 04/06/2017    LABALBU 4.1 10/16/2015     Lab Results   Component

## 2019-07-06 LAB
ANION GAP SERPL CALCULATED.3IONS-SCNC: 7 MMOL/L (ref 7–16)
ANION GAP SERPL CALCULATED.3IONS-SCNC: 8 MMOL/L (ref 7–16)
ANION GAP SERPL CALCULATED.3IONS-SCNC: 8 MMOL/L (ref 7–16)
BUN BLDV-MCNC: 23 MG/DL (ref 8–23)
BUN BLDV-MCNC: 23 MG/DL (ref 8–23)
BUN BLDV-MCNC: 24 MG/DL (ref 8–23)
CALCIUM SERPL-MCNC: 9.3 MG/DL (ref 8.6–10.2)
CALCIUM SERPL-MCNC: 9.5 MG/DL (ref 8.6–10.2)
CALCIUM SERPL-MCNC: 9.6 MG/DL (ref 8.6–10.2)
CHLORIDE BLD-SCNC: 110 MMOL/L (ref 98–107)
CHLORIDE BLD-SCNC: 111 MMOL/L (ref 98–107)
CHLORIDE BLD-SCNC: 112 MMOL/L (ref 98–107)
CO2: 25 MMOL/L (ref 22–29)
CO2: 28 MMOL/L (ref 22–29)
CO2: 30 MMOL/L (ref 22–29)
CREAT SERPL-MCNC: 0.5 MG/DL (ref 0.7–1.2)
CREAT SERPL-MCNC: 0.6 MG/DL (ref 0.7–1.2)
CREAT SERPL-MCNC: 0.6 MG/DL (ref 0.7–1.2)
GFR AFRICAN AMERICAN: >60
GFR NON-AFRICAN AMERICAN: >60 ML/MIN/1.73
GLUCOSE BLD-MCNC: 124 MG/DL (ref 74–99)
GLUCOSE BLD-MCNC: 127 MG/DL (ref 74–99)
GLUCOSE BLD-MCNC: 135 MG/DL (ref 74–99)
POTASSIUM SERPL-SCNC: 3.8 MMOL/L (ref 3.5–5)
POTASSIUM SERPL-SCNC: 4 MMOL/L (ref 3.5–5)
POTASSIUM SERPL-SCNC: 5.2 MMOL/L (ref 3.5–5)
SODIUM BLD-SCNC: 145 MMOL/L (ref 132–146)
SODIUM BLD-SCNC: 147 MMOL/L (ref 132–146)
SODIUM BLD-SCNC: 147 MMOL/L (ref 132–146)

## 2019-07-06 PROCEDURE — 80048 BASIC METABOLIC PNL TOTAL CA: CPT

## 2019-07-06 PROCEDURE — 2580000003 HC RX 258: Performed by: INTERNAL MEDICINE

## 2019-07-06 PROCEDURE — 6370000000 HC RX 637 (ALT 250 FOR IP): Performed by: INTERNAL MEDICINE

## 2019-07-06 PROCEDURE — 6360000002 HC RX W HCPCS: Performed by: INTERNAL MEDICINE

## 2019-07-06 PROCEDURE — 36415 COLL VENOUS BLD VENIPUNCTURE: CPT

## 2019-07-06 PROCEDURE — 2060000000 HC ICU INTERMEDIATE R&B

## 2019-07-06 PROCEDURE — 2700000000 HC OXYGEN THERAPY PER DAY

## 2019-07-06 PROCEDURE — 94640 AIRWAY INHALATION TREATMENT: CPT

## 2019-07-06 RX ADMIN — ASPIRIN 81 MG 81 MG: 81 TABLET ORAL at 08:57

## 2019-07-06 RX ADMIN — CLOPIDOGREL 75 MG: 75 TABLET, FILM COATED ORAL at 08:57

## 2019-07-06 RX ADMIN — ATORVASTATIN CALCIUM 40 MG: 40 TABLET, FILM COATED ORAL at 20:17

## 2019-07-06 RX ADMIN — HEPARIN SODIUM 5000 UNITS: 10000 INJECTION INTRAVENOUS; SUBCUTANEOUS at 20:17

## 2019-07-06 RX ADMIN — Medication 10 ML: at 08:58

## 2019-07-06 RX ADMIN — ALBUTEROL SULFATE 2.5 MG: 2.5 SOLUTION RESPIRATORY (INHALATION) at 09:00

## 2019-07-06 RX ADMIN — ALBUTEROL SULFATE 2.5 MG: 2.5 SOLUTION RESPIRATORY (INHALATION) at 21:59

## 2019-07-06 RX ADMIN — Medication 10 ML: at 20:17

## 2019-07-06 RX ADMIN — HEPARIN SODIUM 5000 UNITS: 10000 INJECTION INTRAVENOUS; SUBCUTANEOUS at 08:57

## 2019-07-06 ASSESSMENT — PAIN SCALES - GENERAL
PAINLEVEL_OUTOF10: 0

## 2019-07-06 NOTE — PROGRESS NOTES
results found for: PHART, PO2ART, COH9VRF    Iron Studies: No results found for: IRON, TIBC, FERRITIN      IMPRESSION/RECOMMENDATIONS:        1.  Hypernatremia  In setting of free water deficit with recent cva and peg placement  Unclear if he was getting free water at nh  S/p D5W (now stopped)  Continue free water q 6 w/ improving levels      2. Sp left mca cva 6/19  Sp peg       [facility]    Pt seen and examined agree with above  Will increase free water to 350 q 4  outpt follow up of marc Richardson

## 2019-07-06 NOTE — PROGRESS NOTES
Hospitalist Progress Note    CC: Hypernatremia    Hospital course:    Mr. Albert Alarcon is a 76 yo with a hx of COPD, oropharyngeal cancer s/p chemo/xrt/surgery 8 yrs ago cancer free since, s/p jaw surgery, recently admitted from -2019 for a new diagnosis of L MCA embolic stroke 2/2 carotid stenosis. Pt was not a tPA candidate due to being outside the window. Pt was not a candidate for thrombectomy 2/2 larger core infarct. Hospitalization was complicated by treatment for HCAP and dysphagia s/p PEG. Pt was discharged in stable condition to acute rehab facility. Pt aphasic at baseline but able to nod and shake head appropriately to questions. Presented to ED from [facility] due to hypernatremia. Per the physicians at the facility they are unable to titrate free water flushes for tube feeds and therefore cannot manage the patient's hypernatremia. Pt Na on arrival to . Free water Deficit ~ 6.4L . Renal was consulted and sodium improved. Admit date: 2019  Days in hospital:  5      Subjective:   -- Patient seen and examined, chart reviewed  -- Sodium still high today  -- no overnight events  -- has no complaints    ROS:   Review of systems not obtained due to patient factors. Unable to speak.     Objective:  Patient Vitals for the past 24 hrs:   BP Temp Temp src Pulse Resp SpO2 Weight   19 0759 118/66 98 °F (36.7 °C) Temporal 69 14 97 % --   19 0246 -- -- -- -- -- -- 146 lb 3 oz (66.3 kg)   19 0015 112/68 98.4 °F (36.9 °C) Temporal 62 16 97 % --   19 1530 115/63 98.2 °F (36.8 °C) Temporal 68 16 98 % --     Temp (24hrs), Av.2 °F (36.8 °C), Min:98 °F (36.7 °C), Max:98.4 °F (36.9 °C)      Intake/Output Summary (Last 24 hours) at 2019 1102  Last data filed at 2019 0803  Gross per 24 hour   Intake 2685 ml   Output 1000 ml   Net 1685 ml       Gen: Awake, Nods Appropriately, Looks comfortable  HEENT: NC/AT, dry mucous membranes, no oropharyngeal erythema or exudate. Neck: supple, trachea midline, no anterior cervical or SC LAD  Heart:  Normal s1/s2, RRR, no murmurs, gallops, or rubs. no leg edema  Lungs:  Clear to auscultation  bilaterally, no wheeze, no rales, no rhonchi, no crackles, no use of accessory muscles  Abd: bowel sounds present, soft, nontender, nondistended, no masses  Extrem:  No clubbing, cyanosis,  no edema  Skin: no rashes or lesions  Psych: Unable to speak due to CVA. Cannor assess  Neuro: grossly intact, Right Hemiplegia, Present on admission   Unable to fully assess due to Unable to speak. Assessment:    Principal Problem:    Hypernatremia  Active Problems:    Esophageal cancer (HCC)    Emphysematous COPD ([facility]    Cerebrovascular accident (CVA) due to occlusion of middle cerebral artery (HCC)    Dysphagia, oropharyngeal    Severe protein-calorie malnutrition ([facility]    Acute hypoxemic respiratory failure (HCC)    Painless hematuria  Resolved Problems:    * No resolved hospital problems.  *          Plan  -- Continue Volume Resuscitation   -- iv fluids as per renal  -- sodium improved but back up today  -- Restart Tube Feeds  -- Continue Chronic Medications  -- sw on board for placement   -- discharge planning- precert pending still    Prognosis:  Fair    Code status:  Full    DVT prophylaxis: [] Lovenox  [] SQ Heparin  [] SCDs  [] warfarin/oral direct thrombin inhibitor [] Encourage ambulation  GI prophylaxis: [] [id number]  [] not indicated  Diet:  DIET TUBE FEED CONTINUOUS/CYCLIC NPO; Diabetic 1.5; Gastrostomy; Continuous; 25; 50    Disposition:  [] Home  [] Home with home health [] Rehab [] Psych [] SNF  [] LTAC  [] Long term nursing home or group home [] Transfer to ICU  [] Transfer to PCU    Medications:  Scheduled Meds:   docusate sodium  100 mg Oral BID    sodium chloride flush  10 mL Intravenous 2 times per day    albuterol  2.5 mg Nebulization BID    aspirin  81 mg Oral Daily    atorvastatin

## 2019-07-07 LAB
ANION GAP SERPL CALCULATED.3IONS-SCNC: 6 MMOL/L (ref 7–16)
ANION GAP SERPL CALCULATED.3IONS-SCNC: 7 MMOL/L (ref 7–16)
ANION GAP SERPL CALCULATED.3IONS-SCNC: 8 MMOL/L (ref 7–16)
ANION GAP SERPL CALCULATED.3IONS-SCNC: 8 MMOL/L (ref 7–16)
BUN BLDV-MCNC: 23 MG/DL (ref 8–23)
BUN BLDV-MCNC: 24 MG/DL (ref 8–23)
BUN BLDV-MCNC: 24 MG/DL (ref 8–23)
BUN BLDV-MCNC: 25 MG/DL (ref 8–23)
CALCIUM SERPL-MCNC: 10 MG/DL (ref 8.6–10.2)
CALCIUM SERPL-MCNC: 9.6 MG/DL (ref 8.6–10.2)
CALCIUM SERPL-MCNC: 9.7 MG/DL (ref 8.6–10.2)
CALCIUM SERPL-MCNC: 9.8 MG/DL (ref 8.6–10.2)
CHLORIDE BLD-SCNC: 108 MMOL/L (ref 98–107)
CHLORIDE BLD-SCNC: 109 MMOL/L (ref 98–107)
CHLORIDE BLD-SCNC: 109 MMOL/L (ref 98–107)
CHLORIDE BLD-SCNC: 111 MMOL/L (ref 98–107)
CO2: 30 MMOL/L (ref 22–29)
CO2: 30 MMOL/L (ref 22–29)
CO2: 31 MMOL/L (ref 22–29)
CO2: 32 MMOL/L (ref 22–29)
CREAT SERPL-MCNC: 0.6 MG/DL (ref 0.7–1.2)
CREAT SERPL-MCNC: 0.7 MG/DL (ref 0.7–1.2)
GFR AFRICAN AMERICAN: >60
GFR NON-AFRICAN AMERICAN: >60 ML/MIN/1.73
GLUCOSE BLD-MCNC: 119 MG/DL (ref 74–99)
GLUCOSE BLD-MCNC: 130 MG/DL (ref 74–99)
GLUCOSE BLD-MCNC: 131 MG/DL (ref 74–99)
GLUCOSE BLD-MCNC: 134 MG/DL (ref 74–99)
POTASSIUM SERPL-SCNC: 3.7 MMOL/L (ref 3.5–5)
POTASSIUM SERPL-SCNC: 3.8 MMOL/L (ref 3.5–5)
POTASSIUM SERPL-SCNC: 3.9 MMOL/L (ref 3.5–5)
POTASSIUM SERPL-SCNC: 4.4 MMOL/L (ref 3.5–5)
SODIUM BLD-SCNC: 145 MMOL/L (ref 132–146)
SODIUM BLD-SCNC: 147 MMOL/L (ref 132–146)
SODIUM BLD-SCNC: 147 MMOL/L (ref 132–146)
SODIUM BLD-SCNC: 150 MMOL/L (ref 132–146)

## 2019-07-07 PROCEDURE — 97535 SELF CARE MNGMENT TRAINING: CPT

## 2019-07-07 PROCEDURE — 6360000002 HC RX W HCPCS: Performed by: INTERNAL MEDICINE

## 2019-07-07 PROCEDURE — 6370000000 HC RX 637 (ALT 250 FOR IP): Performed by: INTERNAL MEDICINE

## 2019-07-07 PROCEDURE — 80048 BASIC METABOLIC PNL TOTAL CA: CPT

## 2019-07-07 PROCEDURE — 2060000000 HC ICU INTERMEDIATE R&B

## 2019-07-07 PROCEDURE — 2700000000 HC OXYGEN THERAPY PER DAY

## 2019-07-07 PROCEDURE — 97530 THERAPEUTIC ACTIVITIES: CPT

## 2019-07-07 PROCEDURE — 36415 COLL VENOUS BLD VENIPUNCTURE: CPT

## 2019-07-07 PROCEDURE — 2580000003 HC RX 258: Performed by: INTERNAL MEDICINE

## 2019-07-07 PROCEDURE — 94640 AIRWAY INHALATION TREATMENT: CPT

## 2019-07-07 RX ADMIN — HEPARIN SODIUM 5000 UNITS: 10000 INJECTION INTRAVENOUS; SUBCUTANEOUS at 20:49

## 2019-07-07 RX ADMIN — Medication 10 ML: at 20:05

## 2019-07-07 RX ADMIN — DOCUSATE SODIUM 100 MG: 50 LIQUID ORAL at 20:04

## 2019-07-07 RX ADMIN — ATORVASTATIN CALCIUM 40 MG: 40 TABLET, FILM COATED ORAL at 20:04

## 2019-07-07 RX ADMIN — DOCUSATE SODIUM 100 MG: 50 LIQUID ORAL at 10:07

## 2019-07-07 RX ADMIN — Medication 10 ML: at 10:08

## 2019-07-07 RX ADMIN — ALBUTEROL SULFATE 2.5 MG: 2.5 SOLUTION RESPIRATORY (INHALATION) at 21:10

## 2019-07-07 RX ADMIN — CLOPIDOGREL 75 MG: 75 TABLET, FILM COATED ORAL at 10:08

## 2019-07-07 RX ADMIN — HEPARIN SODIUM 5000 UNITS: 10000 INJECTION INTRAVENOUS; SUBCUTANEOUS at 10:09

## 2019-07-07 RX ADMIN — ASPIRIN 81 MG 81 MG: 81 TABLET ORAL at 10:07

## 2019-07-07 RX ADMIN — ALBUTEROL SULFATE 2.5 MG: 2.5 SOLUTION RESPIRATORY (INHALATION) at 08:15

## 2019-07-07 ASSESSMENT — PAIN SCALES - GENERAL
PAINLEVEL_OUTOF10: 0
PAINLEVEL_OUTOF10: 0

## 2019-07-07 NOTE — PROGRESS NOTES
oropharyngeal erythema or exudate. Neck: supple, trachea midline, no anterior cervical or SC LAD  Heart:  Normal s1/s2, RRR, no murmurs, gallops, or rubs. no leg edema  Lungs:  Clear to auscultation  bilaterally, no wheeze, no rales, no rhonchi, no crackles, no use of accessory muscles  Abd: bowel sounds present, soft, nontender, nondistended, no masses  Extrem:  No clubbing, cyanosis,  no edema  Skin: no rashes or lesions  Psych: Unable to speak due to CVA. Cannor assess  Neuro: grossly intact, Right Hemiplegia, Present on admission   Unable to fully assess due to Unable to speak. Assessment:    Principal Problem:    Hypernatremia  Active Problems:    Esophageal cancer (HCC)    Emphysematous COPD ([facility]    Cerebrovascular accident (CVA) due to occlusion of middle cerebral artery (HCC)    Dysphagia, oropharyngeal    Severe protein-calorie malnutrition ([facility]    Acute hypoxemic respiratory failure (HCC)    Painless hematuria  Resolved Problems:    * No resolved hospital problems.  *          Plan  -- Continue Volume Resuscitation   -- iv fluids as per renal, free water increased  -- sodium improved but back up today  -- Restarted Tube Feeds  -- Continue Chronic Medications  -- sw on board for placement   -- discharge planning- precert pending still    Prognosis:  Fair    Code status:  Full    DVT prophylaxis: [] Lovenox  [] SQ Heparin  [] SCDs  [] warfarin/oral direct thrombin inhibitor [] Encourage ambulation  GI prophylaxis: [] PPI/[id number]  [] not indicated  Diet:  DIET TUBE FEED CONTINUOUS/CYCLIC NPO; Diabetic 1.5; Gastrostomy; Continuous; 25; 50    Disposition:  [] Home  [] Home with home health [] Rehab [] Psych [] SNF  [] LTAC  [] Long term nursing home or group home [] Transfer to ICU  [] Transfer to PCU    Medications:  Scheduled Meds:   docusate sodium  100 mg Oral BID    sodium chloride flush  10 mL Intravenous 2 times per day    albuterol  2.5 mg Nebulization BID    aspirin  81 mg Oral Daily   

## 2019-07-07 NOTE — PROGRESS NOTES
Physical Therapy  Facility/Department: [facility]  Daily Treatment Note  NAME: Junior Hugo  : 1950  MRN: [id number]    Date of Service: 2019   Evaluating Therapist: Danie Hernandez PT, DPT         Equipment Recommendations: to be determined.      Room #: 5651/5488-U  DIAGNOSIS: hypernatremia  PRECAUTIONS: History of L MCA,  R Hemiparesis, Fall risk, PEG     Social:  Pt lives with wife in a capecod but able to stay first floor, 1+1+3 steps and 0 rails to enter. Pt admit from acute rehab facility. Prior to admission pt walked with no device. At rehab facility pt working on standing and pivoting.                 Initial Evaluation  Date:  Treatment     19 Short Term/ Long Term   Goals   Was pt agreeable to Eval/treatment? yes yes      Does pt have pain? No c/o pain No c/o pain      Bed Mobility  Rolling: min A   Supine to sit: min A   Sit to supine: max A   Scooting: NT  Rolling: min A   Supine to sit: mod A   Sit to supine: NT   Scooting: Erin Min A    Transfers Sit to stand: mod A x 2  Stand to sit: mod A x 2  Stand pivot: NT  Sit to stand: min A x 2 HHA  Stand to sit: min A x 2 HHA  Stand pivot: Erin x 2 HHA Mod A    Ambulation    NT  3' Erin x 2 HHA NT   Stair negotiation: ascended and descended  NT   NT   AM-PAC Raw Score 10/24  11/24        Pt is alert and Oriented x3 - pt shakes head yes/no when given choices for year, month and place. ROM:  L LE grossly WFL  R LE Hypotonic  Strength:   L LE grossly 4/5  R LE ankle 0/5, knee extension 1/5, knee flexion 1/5, hip 1/5  Balance: standing Erin x 2 with R knee blocked. Sensation: denies parasthesia   Endurance: fair        Patient education  Pt was educated on role of PT    Patient response to education:   Pt verbalized understanding Pt demonstrated skill Pt requires further education in this area   x x x     Additional Comments: Pt received in supine agreeable to PT treatment.  Pt requiring verbal cues and assistance with trunk for bed mobility. Pt with poor trunk control with LOB at edge of bed requiring assistance and cues to correct. Pt able to sit statically with LUE and periods of no physical assistance. Pt tolerating approximately 8 min at edge of bed. Pt performed sit to stand transfer with verbal cues and support of RUE and blocking of RLE. Pt able to step with LLE anterolaterally and perform shuffled steps with RLE with [place]. Pt requiring assistance for eccentric control due to strength deficits. PT will continue with plan of care, and progress pt as able. Pt positioned optimally in bedside chair with all needs met and call bell in reach. Time in: 1035  Time out: 0    Pt is making progress toward established Physical Therapy goals. Continue with physical therapy current plan of care.     Sonido Larios PT, DPT  [id number]

## 2019-07-08 VITALS
RESPIRATION RATE: 16 BRPM | HEIGHT: 72 IN | HEART RATE: 74 BPM | OXYGEN SATURATION: 93 % | BODY MASS INDEX: 19.8 KG/M2 | SYSTOLIC BLOOD PRESSURE: 128 MMHG | TEMPERATURE: 97.5 F | DIASTOLIC BLOOD PRESSURE: 70 MMHG | WEIGHT: 146.19 LBS

## 2019-07-08 LAB
ANION GAP SERPL CALCULATED.3IONS-SCNC: 7 MMOL/L (ref 7–16)
ANION GAP SERPL CALCULATED.3IONS-SCNC: 8 MMOL/L (ref 7–16)
ANION GAP SERPL CALCULATED.3IONS-SCNC: 9 MMOL/L (ref 7–16)
BUN BLDV-MCNC: 23 MG/DL (ref 8–23)
CALCIUM SERPL-MCNC: 10 MG/DL (ref 8.6–10.2)
CALCIUM SERPL-MCNC: 9.6 MG/DL (ref 8.6–10.2)
CALCIUM SERPL-MCNC: 9.7 MG/DL (ref 8.6–10.2)
CHLORIDE BLD-SCNC: 105 MMOL/L (ref 98–107)
CHLORIDE BLD-SCNC: 106 MMOL/L (ref 98–107)
CHLORIDE BLD-SCNC: 108 MMOL/L (ref 98–107)
CO2: 30 MMOL/L (ref 22–29)
CO2: 30 MMOL/L (ref 22–29)
CO2: 33 MMOL/L (ref 22–29)
CREAT SERPL-MCNC: 0.5 MG/DL (ref 0.7–1.2)
CREAT SERPL-MCNC: 0.6 MG/DL (ref 0.7–1.2)
CREAT SERPL-MCNC: 0.7 MG/DL (ref 0.7–1.2)
GFR AFRICAN AMERICAN: >60
GFR NON-AFRICAN AMERICAN: >60 ML/MIN/1.73
GLUCOSE BLD-MCNC: 117 MG/DL (ref 74–99)
GLUCOSE BLD-MCNC: 120 MG/DL (ref 74–99)
GLUCOSE BLD-MCNC: 133 MG/DL (ref 74–99)
POTASSIUM SERPL-SCNC: 3.5 MMOL/L (ref 3.5–5)
POTASSIUM SERPL-SCNC: 4.3 MMOL/L (ref 3.5–5)
POTASSIUM SERPL-SCNC: 4.4 MMOL/L (ref 3.5–5)
SODIUM BLD-SCNC: 145 MMOL/L (ref 132–146)
SODIUM BLD-SCNC: 145 MMOL/L (ref 132–146)
SODIUM BLD-SCNC: 146 MMOL/L (ref 132–146)

## 2019-07-08 PROCEDURE — 36415 COLL VENOUS BLD VENIPUNCTURE: CPT

## 2019-07-08 PROCEDURE — 2700000000 HC OXYGEN THERAPY PER DAY

## 2019-07-08 PROCEDURE — 94640 AIRWAY INHALATION TREATMENT: CPT

## 2019-07-08 PROCEDURE — 6360000002 HC RX W HCPCS: Performed by: INTERNAL MEDICINE

## 2019-07-08 PROCEDURE — 80048 BASIC METABOLIC PNL TOTAL CA: CPT

## 2019-07-08 PROCEDURE — 2580000003 HC RX 258: Performed by: INTERNAL MEDICINE

## 2019-07-08 PROCEDURE — 6370000000 HC RX 637 (ALT 250 FOR IP): Performed by: INTERNAL MEDICINE

## 2019-07-08 RX ADMIN — ALBUTEROL SULFATE 2.5 MG: 2.5 SOLUTION RESPIRATORY (INHALATION) at 08:51

## 2019-07-08 RX ADMIN — CLOPIDOGREL 75 MG: 75 TABLET, FILM COATED ORAL at 10:05

## 2019-07-08 RX ADMIN — ASPIRIN 81 MG 81 MG: 81 TABLET ORAL at 10:05

## 2019-07-08 RX ADMIN — HEPARIN SODIUM 5000 UNITS: 10000 INJECTION INTRAVENOUS; SUBCUTANEOUS at 10:00

## 2019-07-08 RX ADMIN — Medication 10 ML: at 10:05

## 2019-07-08 ASSESSMENT — PAIN SCALES - GENERAL
PAINLEVEL_OUTOF10: 0

## 2019-07-08 NOTE — PROGRESS NOTES
rhonchi, or wheezes  Gastrointestinal:  Soft, nontender, nondistended, bowel sounds x 4, PEG tube in place. Ext: no edema  Skin: dry, no rash  Neuro: post CVA - aphasic     MEDS (scheduled):    docusate sodium  100 mg Oral BID    sodium chloride flush  10 mL Intravenous 2 times per day    albuterol  2.5 mg Nebulization BID    aspirin  81 mg Oral Daily    atorvastatin  40 mg Oral Nightly    clopidogrel  75 mg Oral Daily    heparin (porcine)  5,000 Units Subcutaneous Q12H    polyethylene glycol  17 g Oral Daily       MEDS (infusions):      MEDS (prn):  sodium chloride flush, magnesium hydroxide, ondansetron, albuterol, bisacodyl, sodium chloride    DATA:    No results for input(s): WBC, HGB, HCT, MCV, PLT in the last 72 hours. Recent Labs     07/07/19  1720 07/08/19  0041 07/08/19  0530   * 146 145   K 3.9 4.4 4.3   * 108* 106   CO2 32* 30* 30*   BUN 24* 23 23   CREATININE 0.6* 0.5* 0.7   LABGLOM >60 >60 >60   GLUCOSE 130* 120* 117*   CALCIUM 10.0 9.6 9.7       Lab Results   Component Value Date    LABALBU 3.4 (L) 07/01/2019    LABALBU 4.2 04/06/2017    LABALBU 4.1 10/16/2015     Lab Results   Component Value Date    TSH 0.787 07/01/2019     No results found for: PHART, PO2ART, CMR3VGU    Iron Studies: No results found for: IRON, TIBC, FERRITIN      IMPRESSION/RECOMMENDATIONS:        · Hypernatremia- In setting of free water deficit with recent CVA and peg placement. Free water adjusted and Na stable at 350 cc Q 4 hrs      · S/P left MCA CVA  6/19. Now with recent PEG placement.      Stable for discharge from renal: follow twice weekly labs as OP    Hollice Ormond, APRN - CNP     Patient seen and examined all key components of the physical performed independently , case discussed with NP, all pertinent labs and radiologic tests personally reviewed agree with above.       Steve Tanner MD

## 2019-07-08 NOTE — PROGRESS NOTES
Medications:  Reviewed    Infusion Medications   Scheduled Medications    docusate sodium  100 mg Oral BID    sodium chloride flush  10 mL Intravenous 2 times per day    albuterol  2.5 mg Nebulization BID    aspirin  81 mg Oral Daily    atorvastatin  40 mg Oral Nightly    clopidogrel  75 mg Oral Daily    heparin (porcine)  5,000 Units Subcutaneous Q12H    polyethylene glycol  17 g Oral Daily     PRN Meds: sodium chloride flush, magnesium hydroxide, ondansetron, albuterol, bisacodyl, sodium chloride    I/O    Intake/Output Summary (Last 24 hours) at 7/8/2019 0733  Last data filed at 7/8/2019 0000  Gross per 24 hour   Intake 2953 ml   Output 350 ml   Net 2603 ml       Labs:   No results for input(s): WBC, HGB, HCT, PLT in the last 72 hours. Recent Labs     07/07/19  1720 07/08/19  0041 07/08/19  0530   * 146 145   K 3.9 4.4 4.3   * 108* 106   CO2 32* 30* 30*   BUN 24* 23 23   CREATININE 0.6* 0.5* 0.7   CALCIUM 10.0 9.6 9.7       No results for input(s): PROT, ALB, ALKPHOS, ALT, AST, BILITOT, AMYLASE, LIPASE in the last 72 hours. No results for input(s): INR in the last 72 hours. No results for input(s): Jovita Self in the last 72 hours. Chronic labs:  Lab Results   Component Value Date    CHOL 146 06/20/2019    TRIG 66 06/20/2019    HDL 39 06/20/2019    LDLCALC 94 06/20/2019    TSH 0.787 07/01/2019    PSA 2.08 02/25/2015    INR 1.0 06/19/2019    LABA1C 5.9 (H) 06/20/2019       Radiology:  Imaging studies reviewed today. ASSESSMENT:    Principal Problem:    Hypernatremia  Active Problems:    Esophageal cancer (HCC)    Emphysematous COPD ([address]    Cerebrovascular accident (CVA) due to occlusion of middle cerebral artery (HCC)    Dysphagia, oropharyngeal    Severe protein-calorie malnutrition ([address]    Acute hypoxemic respiratory failure (HCC)    Painless hematuria  Resolved Problems:    * No resolved hospital problems.  *       PLAN:  Presented from [facility] for hyponatremia in

## 2019-07-09 NOTE — DISCHARGE SUMMARY
Hospitalist Discharge Summary    Patient ID: Shagufta Ly   Patient : 1950  Patient's PCP: Kathy Abdul DO    Admit Date: 2019   Admitting Physician: Shyla De Anda MD    Discharge Date:  2019  Discharge Physician: Tate Cortez MD   Discharge Condition: Stable  Discharge Disposition: [facility] course in brief:  (Please refer to daily progress notes for a comprehensive review of the hospitalization by requesting medical records)    Mr. Reginaldo Torres is a 78 yo with a hx of COPD, oropharyngeal cancer s/p chemo/xrt/surgery 8 yrs ago cancer free since, s/p jaw surgery, recently admitted from -2019 for a new diagnosis of L MCA embolic stroke 2/2 carotid stenosis. Pt was not a tPA candidate due to being outside the window. Pt was not a candidate for thrombectomy 2/2 larger core infarct. Hospitalization was complicated by treatment for HCAP and dysphagia s/p PEG. Pt was discharged in stable condition to acute rehab facility. Pt aphasic at baseline but able to nod and shake head appropriately to questions. Presented to ED from [facility] due to hypernatremia. Per the physicians at the facility they are unable to titrate free water flushes for tube feeds and therefore cannot manage the patient's hypernatremia.  Pt Na on arrival to .  Free water Deficit ~ 6.4L . Renal was consulted and sodium improved        Consults:   IP CONSULT TO INTERNAL MEDICINE  IP CONSULT TO INTERNAL MEDICINE  IP CONSULT TO INTERNAL MEDICINE  IP CONSULT TO INTERNAL MEDICINE  IP CONSULT TO NEPHROLOGY    Discharge Diagnoses:    Principal Problem:    Hypernatremia  Active Problems:    Esophageal cancer (HCC)    Emphysematous COPD ([address]    Cerebrovascular accident (CVA) due to occlusion of middle cerebral artery (HCC)    Dysphagia, oropharyngeal    Severe protein-calorie malnutrition ([address]    Acute hypoxemic respiratory failure (HCC)    Painless hematuria  Resolved Problems:    * No resolved hospital lobes, since the study of 2019. There is no significant mass effect upon the ventricular system or overlying convexity sulci. No other areas of increased or decreased density are seen within the brain. The ventricular system is normal. There is no undue prominence to the convexity sulci. There is no evidence of an intracranial hemorrhage or hematoma. There is no apparent fracture involving the calvarium. There is a small polyp or retention cyst within the right maxillary sinus. The visualized paranasal sinuses and mastoid air cells are otherwise clear. Interval evolution of the left middle cerebral artery ischemic infarct since the study of 2019, as above. No additional abnormality or intracranial hemorrhage or hematoma. Xr Chest Portable    Result Date: 2019  Patient MRN:  [id number] : 1950 Age: 77 years Gender: Male Order Date:  2019 2:15 PM EXAM: XR CHEST PORTABLE COMPARISON: 2019 INDICATION:  fatigue fatigue FINDINGS: Opacities are seen at medial aspect of right and left lung base appearing similar since prior. No evidence of pleural effusion. The heart is normal in size. No pneumothorax. Redemonstration of opacities at medial aspect of right and left lung base which could suggest persistent pneumonia or areas of atelectasis. Continued follow-up may be helpful for further evaluation. Xr Chest Portable    Result Date: 2019  Clinical indications: Cough. Shortness of breath. TECHNIQUE: Single frontal projection of the chest (1 view). COMPARISON: 2019. FINDINGS: The heart is upper limits normal in size. There is calcification within thoracic aorta. Acromioclavicular arthropathy. Diffuse bone demineralization. New infiltrates within the bilateral lung bases. The heart, lungs, mediastinum and regional skeleton are otherwise unremarkable. New infiltrates within the bilateral lung bases.      Xr Chest Portable    Result Date: 2019  Location:200 Exam: There is a left-sided infarct involving the basal ganglia and caudate nucleus extending into the insular cortex in the anterior temporal lobe and there are small additional lacunar infarcts in the left parietal lobe. There is minimal atrophy present. There is no evidence of any mass, hemorrhage or midline shift. Left-sided recent infarct involving the basal ganglia and left caudate head extensively and also involving the left insula and anterior temporal lobe. Small recent lacunar infarcts in the left high parietal lobe as well. Fl Modified Barium Swallow W Video    Result Date: 2019  Patient MRN: [id number] : 1950 Age:  80 years Gender: Male Order Date: 2019 1:15 PM Exam: FL MODIFIED [address] Number of Images: 1 views Indication:   Stoke, Failed Bedside swallow Stoke, Failed Bedside swallow Comparison: None. Fluoroscopy time: 1 minutes; .9 uGym2 Findings: Textures given, thin, nectar, honey, pudding Aspiration, thin, nectar,  honey, pudding Laryngeal penetration, none Cough, Yes Oral delay, Yes Retention in vallecula, Yes Retention in piriform sinuses, Yes Pharyngeal delay, No Laryngeal elevation, reduced     Study was remarkable for aspiration of thin, nectar, honey, and pudding consistencies with positive cough reflex with aspiration. Patient also demonstrated an oral delay. There was evidence for retention within the vallecula and piriform sinuses. There was also a reduced laryngeal elevation noted during the exam. This procedure was performed by Amanda Bennett CNP under the indirect supervision of  Melina Delaney MD. The exam has been dictated and signed by Chong Bennett CNP. Carroll Nava MD, Radiologist, have reviewed the images and report and concur with these findings.      Us Dup Lower Extremities Bilateral Venous    Result Date: 2019  Patient MRN:  [id number] : 1950 Age: 80 years Gender: Male Order Date:  2019 12:15 PM EXAM: [address]

## 2020-07-11 ENCOUNTER — APPOINTMENT (OUTPATIENT)
Dept: GENERAL RADIOLOGY | Age: 70
DRG: 481 | End: 2020-07-11
Payer: MEDICARE

## 2020-07-11 ENCOUNTER — HOSPITAL ENCOUNTER (INPATIENT)
Age: 70
LOS: 4 days | Discharge: NURSING, REHAB OR LONG-TERM CARE | DRG: 481 | End: 2020-07-15
Attending: EMERGENCY MEDICINE | Admitting: INTERNAL MEDICINE
Payer: MEDICARE

## 2020-07-11 DIAGNOSIS — S72.141A CLOSED 2-PART INTERTROCHANTERIC FRACTURE OF RIGHT FEMUR, INITIAL ENCOUNTER (HCC): Primary | ICD-10-CM

## 2020-07-11 DIAGNOSIS — S72.001A FRACTURE, HIP, RIGHT, CLOSED, INITIAL ENCOUNTER (HCC): ICD-10-CM

## 2020-07-11 LAB
ABO/RH: NORMAL
ANION GAP SERPL CALCULATED.3IONS-SCNC: 9 MMOL/L (ref 7–16)
ANTIBODY SCREEN: NORMAL
APTT: 27.9 SEC (ref 24.5–35.1)
BASOPHILS ABSOLUTE: 0 E9/L (ref 0–0.2)
BASOPHILS RELATIVE PERCENT: 0.1 % (ref 0–2)
BUN BLDV-MCNC: 15 MG/DL (ref 8–23)
CALCIUM SERPL-MCNC: 9.1 MG/DL (ref 8.6–10.2)
CHLORIDE BLD-SCNC: 105 MMOL/L (ref 98–107)
CO2: 24 MMOL/L (ref 22–29)
CREAT SERPL-MCNC: 0.4 MG/DL (ref 0.7–1.2)
EOSINOPHILS ABSOLUTE: 0 E9/L (ref 0.05–0.5)
EOSINOPHILS RELATIVE PERCENT: 0.1 % (ref 0–6)
GFR AFRICAN AMERICAN: >60
GFR NON-AFRICAN AMERICAN: >60 ML/MIN/1.73
GLUCOSE BLD-MCNC: 186 MG/DL (ref 74–99)
HCT VFR BLD CALC: 34.5 % (ref 37–54)
HEMOGLOBIN: 11.5 G/DL (ref 12.5–16.5)
INR BLD: 1.2
LYMPHOCYTES ABSOLUTE: 0.1 E9/L (ref 1.5–4)
LYMPHOCYTES RELATIVE PERCENT: 0.9 % (ref 20–42)
MCH RBC QN AUTO: 33.8 PG (ref 26–35)
MCHC RBC AUTO-ENTMCNC: 33.3 % (ref 32–34.5)
MCV RBC AUTO: 101.5 FL (ref 80–99.9)
MONOCYTES ABSOLUTE: 0.83 E9/L (ref 0.1–0.95)
MONOCYTES RELATIVE PERCENT: 7.9 % (ref 2–12)
NEUTROPHILS ABSOLUTE: 9.46 E9/L (ref 1.8–7.3)
NEUTROPHILS RELATIVE PERCENT: 91.2 % (ref 43–80)
PDW BLD-RTO: 13.2 FL (ref 11.5–15)
PLATELET # BLD: 183 E9/L (ref 130–450)
PMV BLD AUTO: 12.6 FL (ref 7–12)
POTASSIUM SERPL-SCNC: 3.9 MMOL/L (ref 3.5–5)
PROTHROMBIN TIME: 13.2 SEC (ref 9.3–12.4)
RBC # BLD: 3.4 E12/L (ref 3.8–5.8)
SARS-COV-2, NAAT: NOT DETECTED
SODIUM BLD-SCNC: 138 MMOL/L (ref 132–146)
WBC # BLD: 10.4 E9/L (ref 4.5–11.5)

## 2020-07-11 PROCEDURE — 85610 PROTHROMBIN TIME: CPT

## 2020-07-11 PROCEDURE — 85025 COMPLETE CBC W/AUTO DIFF WBC: CPT

## 2020-07-11 PROCEDURE — 6360000002 HC RX W HCPCS: Performed by: EMERGENCY MEDICINE

## 2020-07-11 PROCEDURE — 86900 BLOOD TYPING SEROLOGIC ABO: CPT

## 2020-07-11 PROCEDURE — 96376 TX/PRO/DX INJ SAME DRUG ADON: CPT

## 2020-07-11 PROCEDURE — 80048 BASIC METABOLIC PNL TOTAL CA: CPT

## 2020-07-11 PROCEDURE — 96375 TX/PRO/DX INJ NEW DRUG ADDON: CPT

## 2020-07-11 PROCEDURE — P9016 RBC LEUKOCYTES REDUCED: HCPCS

## 2020-07-11 PROCEDURE — 36430 TRANSFUSION BLD/BLD COMPNT: CPT

## 2020-07-11 PROCEDURE — U0002 COVID-19 LAB TEST NON-CDC: HCPCS

## 2020-07-11 PROCEDURE — 2580000003 HC RX 258: Performed by: INTERNAL MEDICINE

## 2020-07-11 PROCEDURE — 86901 BLOOD TYPING SEROLOGIC RH(D): CPT

## 2020-07-11 PROCEDURE — 73502 X-RAY EXAM HIP UNI 2-3 VIEWS: CPT

## 2020-07-11 PROCEDURE — 86923 COMPATIBILITY TEST ELECTRIC: CPT

## 2020-07-11 PROCEDURE — 86850 RBC ANTIBODY SCREEN: CPT

## 2020-07-11 PROCEDURE — 99285 EMERGENCY DEPT VISIT HI MDM: CPT

## 2020-07-11 PROCEDURE — 96374 THER/PROPH/DIAG INJ IV PUSH: CPT

## 2020-07-11 PROCEDURE — 85730 THROMBOPLASTIN TIME PARTIAL: CPT

## 2020-07-11 PROCEDURE — 73552 X-RAY EXAM OF FEMUR 2/>: CPT

## 2020-07-11 PROCEDURE — 71045 X-RAY EXAM CHEST 1 VIEW: CPT

## 2020-07-11 PROCEDURE — 1200000000 HC SEMI PRIVATE

## 2020-07-11 PROCEDURE — 6360000002 HC RX W HCPCS: Performed by: INTERNAL MEDICINE

## 2020-07-11 PROCEDURE — 36415 COLL VENOUS BLD VENIPUNCTURE: CPT

## 2020-07-11 PROCEDURE — 6370000000 HC RX 637 (ALT 250 FOR IP): Performed by: INTERNAL MEDICINE

## 2020-07-11 RX ORDER — ACETAMINOPHEN 325 MG/1
650 TABLET ORAL EVERY 4 HOURS PRN
Status: DISCONTINUED | OUTPATIENT
Start: 2020-07-11 | End: 2020-07-15 | Stop reason: HOSPADM

## 2020-07-11 RX ORDER — ATORVASTATIN CALCIUM 40 MG/1
40 TABLET, FILM COATED ORAL NIGHTLY
COMMUNITY

## 2020-07-11 RX ORDER — ASPIRIN 81 MG/1
81 TABLET, CHEWABLE ORAL NIGHTLY
COMMUNITY

## 2020-07-11 RX ORDER — FENTANYL CITRATE 50 UG/ML
25 INJECTION, SOLUTION INTRAMUSCULAR; INTRAVENOUS
Status: DISCONTINUED | OUTPATIENT
Start: 2020-07-11 | End: 2020-07-12

## 2020-07-11 RX ORDER — CLOPIDOGREL BISULFATE 75 MG/1
75 TABLET ORAL NIGHTLY
COMMUNITY

## 2020-07-11 RX ORDER — KETOROLAC TROMETHAMINE 15 MG/ML
15 INJECTION, SOLUTION INTRAMUSCULAR; INTRAVENOUS EVERY 6 HOURS PRN
Status: DISCONTINUED | OUTPATIENT
Start: 2020-07-11 | End: 2020-07-15 | Stop reason: HOSPADM

## 2020-07-11 RX ORDER — ATORVASTATIN CALCIUM 40 MG/1
40 TABLET, FILM COATED ORAL NIGHTLY
Status: DISCONTINUED | OUTPATIENT
Start: 2020-07-11 | End: 2020-07-15 | Stop reason: HOSPADM

## 2020-07-11 RX ORDER — MORPHINE SULFATE 5 MG/ML
5 INJECTION, SOLUTION INTRAMUSCULAR; INTRAVENOUS ONCE
Status: COMPLETED | OUTPATIENT
Start: 2020-07-11 | End: 2020-07-11

## 2020-07-11 RX ORDER — SODIUM CHLORIDE 0.9 % (FLUSH) 0.9 %
10 SYRINGE (ML) INJECTION EVERY 12 HOURS SCHEDULED
Status: DISCONTINUED | OUTPATIENT
Start: 2020-07-11 | End: 2020-07-12 | Stop reason: SDUPTHER

## 2020-07-11 RX ORDER — SODIUM CHLORIDE 0.9 % (FLUSH) 0.9 %
10 SYRINGE (ML) INJECTION PRN
Status: DISCONTINUED | OUTPATIENT
Start: 2020-07-11 | End: 2020-07-12 | Stop reason: SDUPTHER

## 2020-07-11 RX ADMIN — FENTANYL CITRATE 25 MCG: 50 INJECTION, SOLUTION INTRAMUSCULAR; INTRAVENOUS at 19:21

## 2020-07-11 RX ADMIN — FENTANYL CITRATE 25 MCG: 50 INJECTION, SOLUTION INTRAMUSCULAR; INTRAVENOUS at 21:29

## 2020-07-11 RX ADMIN — SODIUM CHLORIDE, PRESERVATIVE FREE 10 ML: 5 INJECTION INTRAVENOUS at 21:29

## 2020-07-11 RX ADMIN — DESMOPRESSIN ACETATE 40 MG: 0.2 TABLET ORAL at 21:29

## 2020-07-11 RX ADMIN — FENTANYL CITRATE 25 MCG: 50 INJECTION, SOLUTION INTRAMUSCULAR; INTRAVENOUS at 16:33

## 2020-07-11 RX ADMIN — MORPHINE SULFATE 5 MG: 5 INJECTION, SOLUTION INTRAMUSCULAR; INTRAVENOUS at 13:41

## 2020-07-11 RX ADMIN — SODIUM CHLORIDE, PRESERVATIVE FREE 10 ML: 5 INJECTION INTRAVENOUS at 16:35

## 2020-07-11 RX ADMIN — MORPHINE SULFATE 5 MG: 5 INJECTION, SOLUTION INTRAMUSCULAR; INTRAVENOUS at 12:10

## 2020-07-11 ASSESSMENT — PAIN SCALES - GENERAL
PAINLEVEL_OUTOF10: 7
PAINLEVEL_OUTOF10: 9
PAINLEVEL_OUTOF10: 3
PAINLEVEL_OUTOF10: 3
PAINLEVEL_OUTOF10: 8
PAINLEVEL_OUTOF10: 0
PAINLEVEL_OUTOF10: 7
PAINLEVEL_OUTOF10: 8
PAINLEVEL_OUTOF10: 10
PAINLEVEL_OUTOF10: 10
PAINLEVEL_OUTOF10: 8

## 2020-07-11 ASSESSMENT — ENCOUNTER SYMPTOMS
COUGH: 0
SORE THROAT: 0
BACK PAIN: 0
SHORTNESS OF BREATH: 0
ABDOMINAL PAIN: 0
VOMITING: 0
DIARRHEA: 0
CHEST TIGHTNESS: 0
ABDOMINAL DISTENTION: 0
WHEEZING: 0
NAUSEA: 0

## 2020-07-11 ASSESSMENT — PAIN DESCRIPTION - FREQUENCY: FREQUENCY: INTERMITTENT

## 2020-07-11 ASSESSMENT — PAIN DESCRIPTION - PROGRESSION: CLINICAL_PROGRESSION: NOT CHANGED

## 2020-07-11 ASSESSMENT — PAIN DESCRIPTION - LOCATION
LOCATION: HIP
LOCATION: HIP

## 2020-07-11 ASSESSMENT — PAIN DESCRIPTION - ORIENTATION
ORIENTATION: RIGHT
ORIENTATION: RIGHT

## 2020-07-11 ASSESSMENT — PAIN DESCRIPTION - ONSET: ONSET: ON-GOING

## 2020-07-11 ASSESSMENT — PAIN DESCRIPTION - DESCRIPTORS: DESCRIPTORS: ACHING;CONSTANT;SHARP;RADIATING

## 2020-07-11 NOTE — ED PROVIDER NOTES
Chief complaint:  Leg pain      HPI history provided by the patient, family and report  Patient complaining of right proximal femur, hip pain. States he was trying to let the dog out today and had a mechanical fall landing on his right leg, hip. Denies hitting his head. Denies loss of consciousness. No dizziness or lightheadedness. Denies neck or back pain. Denies other extremity pain or injuries. At baseline wears a brace on the right lower leg and sling on the right arm with weakness secondary to previous stroke with no new neurologic deficits since the fall. No anticoagulant use. No chest pain, palpitations or shortness of breath. No abdominal pain or flank pain. Pain worsened with range of motion or palpation of the right hip. No specific treatment prior to arrival at home. Review of Systems   Constitutional: Negative for chills, diaphoresis, fatigue and fever. HENT: Negative for congestion and sore throat. Respiratory: Negative for cough, chest tightness, shortness of breath and wheezing. Cardiovascular: Negative for chest pain, palpitations and leg swelling. Gastrointestinal: Negative for abdominal distention, abdominal pain, diarrhea, nausea and vomiting. Genitourinary: Negative for dysuria, flank pain and frequency. Musculoskeletal: Positive for arthralgias. Negative for back pain, gait problem, joint swelling, myalgias, neck pain and neck stiffness. Skin: Negative for rash and wound. Neurological: Positive for weakness. Negative for dizziness, seizures, syncope, light-headedness, numbness and headaches. Hematological: Negative for adenopathy. All other systems reviewed and are negative. Physical Exam  Vitals signs and nursing note reviewed. Constitutional:       General: He is not in acute distress. Appearance: He is well-developed. He is not ill-appearing, toxic-appearing or diaphoretic. HENT:      Head: Normocephalic and atraumatic.  No raccoon eyes, Wilson's sign or contusion. Comments: No sign of acute head or face injury  Eyes:      Pupils: Pupils are equal, round, and reactive to light. Neck:      Musculoskeletal: Normal range of motion and neck supple. Normal range of motion. No neck rigidity, injury, pain with movement, spinous process tenderness or muscular tenderness. Cardiovascular:      Rate and Rhythm: Normal rate and regular rhythm. Heart sounds: Normal heart sounds. No murmur. Pulmonary:      Effort: Pulmonary effort is normal. No respiratory distress. Breath sounds: Normal breath sounds. No stridor, decreased air movement or transmitted upper airway sounds. No decreased breath sounds, wheezing, rhonchi or rales. Chest:      Chest wall: No tenderness. Comments: No clavicular or sternal tenderness  Abdominal:      General: Bowel sounds are normal. There is no distension. Palpations: Abdomen is soft. Tenderness: There is no abdominal tenderness. There is no right CVA tenderness, left CVA tenderness, guarding or rebound. Musculoskeletal:         General: No swelling, tenderness, deformity or signs of injury. Right lower leg: No edema. Left lower leg: No edema. Comments: No cervical, thoracic or lumbar spine tenderness on palpation. Bilateral upper extremities show no signs of acute bony or joint injuries. He is vascularly intact. Left leg is neurovascular intact with no sign of acute bone or joint injury. Right leg with a brace on the lower leg however shows no sign of acute bony or joint injury throughout the foot, ankle, tib-fib or knee. He has general proximal right femur and hip tenderness on palpation holding the leg flexed up on pillows at this time in front of him. Lymphadenopathy:      Cervical: No cervical adenopathy. Skin:     General: Skin is warm and dry. Coloration: Skin is not jaundiced or pale. Findings: No erythema or rash.    Neurological:      Mental Status: He is alert and oriented to person, place, and time. GCS: GCS eye subscore is 4. GCS verbal subscore is 5. GCS motor subscore is 6. Coordination: Coordination normal.      Comments: Patient with weakness to the right leg, right arm, they are both in  braces or slings. Sensation is grossly intact however. No other focal neurologic deficit. Procedures     [facility]     ED Course as of Jul 11 1443   Sat Jul 11, 2020   1332 Patient resting comfortably no distress. States his leg hurts after having his x-rays done. Updated on work-up results and admission. Nursing staff did inform me that the patient and family did request Dr. Adam Griffin. [NC]   2321 Case discussed with Dr. Ko Davenport, detailed over given, he will admit the patient, does request a COVID be sent on the patient for preop issues. [NC]   8157 Case discussed with Dr. Theodora Haji for orthopedics, Dr. Adam Griffin, detailed over given, they will consult on the patient. [NC]      ED Course User Index  [NC] Bernice Banegas DO        ED Course as of Jul 11 1443   Sat Jul 11, 2020   1332 Patient resting comfortably no distress. States his leg hurts after having his x-rays done. Updated on work-up results and admission. Nursing staff did inform me that the patient and family did request Dr. Adam Griffin. [NC]   5296 Case discussed with Dr. Ko Davenport, detailed over given, he will admit the patient, does request a COVID be sent on the patient for preop issues. [NC]   6272 Case discussed with Dr. Theodora Haji for orthopedics, Dr. Adam Griffin, detailed over given, they will consult on the patient. [NC]      ED Course User Index  [NC] Bruce Post, DO       --------------------------------------------- PAST HISTORY ---------------------------------------------  Past Medical History:  has a past medical history of Cancer ([facility] and COPD (chronic obstructive pulmonary disease) ([facility]     Past Surgical History:  has a past surgical history that includes fracture surgery; Mouth Biopsy (06/25/2012); and Dental surgery (Bilateral, 03/26/2018). Social History:  reports that he quit smoking about 10 years ago. He has a 60.00 pack-year smoking history. He has never used smokeless tobacco. He reports that he does not drink alcohol or use drugs. Family History: family history includes Cancer in his father; Colon Cancer (age of onset: 78) in his father; Diabetes in his paternal grandfather; Heart Attack in his paternal grandfather; Heart Attack (age of onset: 72) in his maternal grandfather; Heart Disease in his father; Immunodeficiency in his brother; Lupus in his mother; Other in his brother; Substance Abuse in his father. The patients home medications have been reviewed. Allergies: Patient has no known allergies.     -------------------------------------------------- RESULTS -------------------------------------------------    LABS:  Results for orders placed or performed during the hospital encounter of 07/11/20   CBC Auto Differential   Result Value Ref Range    WBC 10.4 4.5 - 11.5 E9/L    RBC 3.40 (L) 3.80 - 5.80 E12/L    Hemoglobin 11.5 (L) 12.5 - 16.5 g/dL    Hematocrit 34.5 (L) 37.0 - 54.0 %    .5 (H) 80.0 - 99.9 fL    MCH 33.8 26.0 - 35.0 pg    MCHC 33.3 32.0 - 34.5 %    RDW 13.2 11.5 - 15.0 fL    Platelets 249 078 - 316 E9/L    MPV 12.6 (H) 7.0 - 12.0 fL    Neutrophils % 91.2 (H) 43.0 - 80.0 %    Lymphocytes % 0.9 (L) 20.0 - 42.0 %    Monocytes % 7.9 2.0 - 12.0 %    Eosinophils % 0.1 0.0 - 6.0 %    Basophils % 0.1 0.0 - 2.0 %    Neutrophils Absolute 9.46 (H) 1.80 - 7.30 E9/L    Lymphocytes Absolute 0.10 (L) 1.50 - 4.00 E9/L    Monocytes Absolute 0.83 0.10 - 0.95 E9/L    Eosinophils Absolute 0.00 (L) 0.05 - 0.50 E9/L    Basophils Absolute 0.00 0.00 - 0.20 O7/C   Basic Metabolic Panel   Result Value Ref Range    Sodium 138 132 - 146 mmol/L    Potassium 3.9 3.5 - 5.0 mmol/L    Chloride 105 98 - 107 mmol/L    CO2 24 22 - 29 mmol/L    Anion Gap 9 7 - 16 mmol/L    Glucose 186 (H) 74 - 99 mg/dL    BUN 15 8 - 23 mg/dL    CREATININE 0.4 (L) 0.7 - 1.2 mg/dL    GFR Non-African American >60 >=60 mL/min/1.73    GFR African American >60     Calcium 9.1 8.6 - 10.2 mg/dL   Protime-INR   Result Value Ref Range    Protime 13.2 (H) 9.3 - 12.4 sec    INR 1.2    APTT   Result Value Ref Range    aPTT 27.9 24.5 - 35.1 sec   COVID-19   Result Value Ref Range    SARS-CoV-2, NAAT Not Detected Not Detected   TYPE AND SCREEN   Result Value Ref Range    ABO/Rh A NEG     Antibody Screen NEG        RADIOLOGY:  XR CHEST 1 VW   Final Result   1. There is no evidence of pneumonia or failure. 2. 6 mm nodular density seen within the right perihilar region. Dedicated   follow-up CT of the thorax is recommended for more detailed evaluation. 3. COPD         XR FEMUR RIGHT (MIN 2 VIEWS)   Final Result   Displaced intratrochanteric fracture of the right hip. XR HIP RIGHT (2-3 VIEWS)   Final Result   Displaced intratrochanteric fracture of the right hip.                 ------------------------- NURSING NOTES AND VITALS REVIEWED ---------------------------  Date / Time Roomed:  7/11/2020 11:45 AM  ED Bed Assignment:  17/17    The nursing notes within the ED encounter and vital signs as below have been reviewed. Patient Vitals for the past 24 hrs:   BP Temp Temp src Pulse Resp SpO2 Height Weight   07/11/20 1153 (!) 148/83 98.5 °F (36.9 °C) Axillary 77 18 92 % 6' (1.829 m) 157 lb (71.2 kg)       Oxygen Saturation Interpretation: Normal      Counseling:  I have spoken with the patient and family and discussed todays results, in addition to providing specific details for the plan of care and counseling regarding the diagnosis and prognosis. Their questions are answered at this time and they are agreeable with the plan of admission.     .  This patient's ED course included: a personal history and physicial examination, re-evaluation prior to disposition, multiple bedside re-evaluations and IV medications    This patient has remained hemodynamically stable during their ED course. Diagnosis:  1. Closed 2-part intertrochanteric fracture of right femur, initial encounter ([facility] [address]        Disposition:  Patient's disposition: Admit to med/surg floor  Patient's condition is stable.            Chidi Santa, DO  07/11/20 [address],[facility] [place], [place]  07/11/20 1443

## 2020-07-11 NOTE — ED NOTES
Nurse to nurse called to the 3rd floor and given to luis BURKETT at this time. ER nurse to transport patient upstairs to patient's ready room.       Fredis Hopkins RN  07/11/20 1438

## 2020-07-11 NOTE — ED NOTES
Bed: 17  Expected date:   Expected time:   Means of arrival:   Comments:     Disha Willoughby RN  07/11/20 1142

## 2020-07-11 NOTE — H&P
06/25/2012    right lateral pharynx biopsy. FAMILY Hx:  Family History   Problem Relation Age of Onset    Cancer Father         colon    Heart Disease Father     Colon Cancer Father 78    Substance Abuse Father         cigerrette smoker & wore oxygen.  Lupus Mother     Immunodeficiency Brother         HIV    Other Brother         motor vehicle accident.  Heart Attack Maternal Grandfather 72    Diabetes Paternal Grandfather     Heart Attack Paternal Grandfather        HOME MEDICATIONS:  Prior to Admission medications    Medication Sig Start Date End Date Taking? Authorizing Provider   aspirin 81 MG chewable tablet 81 mg by Per G Tube route nightly   Yes Historical Provider, MD   atorvastatin (LIPITOR) 40 MG tablet 40 mg by Per G Tube route nightly   Yes Historical Provider, MD   clopidogrel (PLAVIX) 75 MG tablet 75 mg by Per G Tube route nightly   Yes Historical Provider, MD   Heparin Sodium, Porcine, (HEPARIN, PORCINE,) 5000 UNIT/ML injection Inject 5,000 Units into the skin every 12 hours (pt due to start 7/1/19 @@ 2100) 7/1/19   Historical Provider, MD   polyethylene glycol (GLYCOLAX) powder Take 17 g by mouth daily 7/1/19   Historical Provider, MD   sodium chloride (OCEAN, BABY AYR) 0.65 % nasal spray 1 spray by Nasal route as needed for Congestion    Historical Provider, MD       ALLERGIES:  Patient has no known allergies.     SOCIAL Hx:  Social History     Socioeconomic History    Marital status:      Spouse name: Henrry Hubbard Number of children: 1    Years of education: Not on file    Highest education level: Not on file   Occupational History    Occupation: retired steel    Social Needs    Financial resource strain: Not on file    Food insecurity     Worry: Not on file     Inability: Not on file   [employer] needs     Medical: Not on file     Non-medical: Not on file   Tobacco Use    Smoking status: Former Smoker     Packs/day: 1.50     Years: 40.00     Pack years: 60.00     Last attempt to quit: 1/15/2010     Years since quitting: 10.4    Smokeless tobacco: Never Used   Substance and Sexual Activity    Alcohol use: No     Comment: occasional    Drug use: No    Sexual activity: Not on file     Comment: N/A   Lifestyle    Physical activity     Days per week: Not on file     Minutes per session: Not on file    Stress: Not on file   Relationships    Social connections     Talks on phone: Not on file     Gets together: Not on file     Attends [employer]ism service: Not on file     Active member of club or organization: Not on file     Attends meetings of clubs or organizations: Not on file     Relationship status: Not on file    Intimate partner violence     Fear of current or ex partner: Not on file     Emotionally abused: Not on file     Physically abused: Not on file     Forced sexual activity: Not on file   Other Topics Concern    Not on file   Social History Narrative    Not on file       ROS:  General:   Denies chills, fatigue, fever, malaise, night sweats or weight loss    Psychological:   Denies anxiety, disorientation or hallucinations    ENT:    Denies epistaxis, headaches, vertigo or visual changes. Positive for dysphagia. Cardiovascular:   Denies any chest pain, irregular heartbeats, or palpitations. No paroxysmal nocturnal dyspnea. Respiratory:   Denies shortness of breath, coughing, sputum production, hemoptysis, or wheezing. No orthopnea. Gastrointestinal:   Denies nausea, vomiting, diarrhea, or constipation. Denies any abdominal pain. Denies change in bowel habits or stools. PEG tube. Genito-Urinary:    Denies any urgency, frequency, hematuria. Voiding without difficulty. Musculoskeletal:   Positive for right-sided weakness status post CVA. Positive for right lower extremity pain. States his right arm hurts a little with movement. Neurology:    Denies any headache or focal neurological deficits.   Right-sided weakness status present. No edema, cyanosis, or swelling. OSTEOPATHIC:    Examined in seated and supine positions. Normal thoracic kyphosis and lumbar lordosis. No acute somatic dysfunction. LABORATORY DATA:  See emergency room course. Patient Active Problem List    Diagnosis Date Noted    Mucositis due to antineoplastic therapy 09/22/2012     Priority: High    Febrile neutropenia ([facility] [address] 09/22/2012     Priority: High    Thrush of mouth and esophagus ([address] 09/22/2012     Priority: Medium    Pancytopenia ([address] 09/22/2012     Priority: Medium    Closed intertrochanteric fracture of right femur, initial encounter ([facility] [address] 07/11/2020    Acute hypoxemic respiratory failure ([address] 07/02/2019    Painless hematuria 07/02/2019    Hypernatremia 07/01/2019    Severe protein-calorie malnutrition ([address] 06/21/2019    Carotid artery stenosis, symptomatic, right     Cerebrovascular accident (CVA) due to occlusion of middle cerebral artery ([address]     Dysphagia, oropharyngeal     Acute ischemic cerebrovascular accident (CVA) involving left middle cerebral artery territory ([address] 06/19/2019    Esophageal cancer ([facility] [address] 08/09/2012    Oral pharyngeal candidiasis 08/09/2012    Emphysematous COPD ([address] 08/09/2012     ASSESSMENT:  · Mechanical fall resulting in a displaced intratrochanteric fracture of the right hip  · CVA with right-sided residual  · Dysphasia-s/p PEG tube  · COPD  · Hyperlipidemia  · Abnormal imaging result-6 mm nodular density within the right perihilar region-dedicated CT of the thorax is recommended-we will need obtained when patient stable. PLAN:  The patient was seen, examined and then discussed with Dr. Hazel Riddle. Patient was admitted to the [place] [address] floor with displaced intratrochanteric fracture of the right hip. Orthopedic surgeon, Dr. Jaden Maxwell, was consulted. Patient is n.p.o. after midnight for tentative surgery tomorrow. Continue current prescribed pain medication, monitor pain and treat accordingly. Secondary to n.p.o. after midnight we will add IV hydration. Patient's medications were reviewed/continued/adjusted. Patient will require PT/OT after surgical procedure. Labs as ordered. More than 50% of my  time was spent at the bedside counseling and/or coordination of care with the patient and/or family with face to face contact. This time was spent reviewing notes and laboratory data, instructing and counseling the patient. Time I spent with the family or surrogate(s) is included only if the patient was incapable of providing the necessary information or participating in medical decisionsI also discussed the differential diagnosis and all of the proposed management plans with the patient and individuals accompanying the patient. I reviewed the patient's past medical, surgical history and medication. Please see orders for further plan of care. Reviewed consultant recommendations/notes and/or discussion. Please see orders for further plan of care. Radha Ceballos, ANKUR, NP-C  4:01 PM  7/11/2020     I discussed the patient's management with the Radha Ceballos NP-C. I reviewed the NP-C's note and agree with the documented findings and plan of care.     Courtney Lockwood D.O., [facility]  5:06 PM  7/11/2020

## 2020-07-11 NOTE — CONSULTS
Department of Orthopedic Surgery  Resident Consult Note          Reason for Consult: Right Hip Pain    HISTORY OF PRESENT ILLNESS:       Patient is a 72 y.o. male who presents with hip pain after a fall. Patient was letting his dog out when he subsequently fell, injuring his right hip. Patient is a previous stroke patient with deficits affecting his right side. He was seen with his wife who cares for him. He uses a cane to ambulate at baseline. He wear bracing due to foot drop on his right side. Patients wife states he is able to swing his thigh for ambulation. Patient denies any other pains associated with the fall. He does have an olecranon bursitis on the left as well that has been subsiding since onset roughly one week ago. Of note, patient and wife were planning on moving to [place] roughly one month from now. Past Medical History:        Diagnosis Date    Cancer ([facility] [address] 06/25/2012    throat-underwent chemo and radiaiton    COPD (chronic obstructive pulmonary disease) ([facility] [address]     CVA (cerebral vascular accident) ([facility] [address]     right sided residual- uses a walker for ambulation     Past Surgical History:        Procedure Laterality Date    DENTAL SURGERY Bilateral 03/26/2018    FRACTURE SURGERY      right thumb    MOUTH BIOPSY  06/25/2012    right lateral pharynx biopsy. Current Medications:   Current Facility-Administered Medications: sodium chloride flush 0.9 % injection 10 mL, 10 mL, Intravenous, 2 times per day  sodium chloride flush 0.9 % injection 10 mL, 10 mL, Intravenous, PRN  acetaminophen (TYLENOL) tablet 650 mg, 650 mg, Oral, Q4H PRN  ketorolac (TORADOL) injection 15 mg, 15 mg, Intravenous, Q6H PRN  fentaNYL (SUBLIMAZE) injection 25 mcg, 25 mcg, Intravenous, Q2H PRN  Facility-Administered Medications Ordered in Other Encounters: filgrastim (NEUPOGEN) injection 300 mcg, 300 mcg, Subcutaneous, Once  Allergies:  Patient has no known allergies.     Social History:   TOBACCO: reports that he quit smoking about 10 years ago. He has a 60.00 pack-year smoking history. He has never used smokeless tobacco.  ETOH:   reports no history of alcohol use. DRUGS:   reports no history of drug use. ACTIVITIES OF DAILY LIVING:    OCCUPATION:    Family History:       Problem Relation Age of Onset    Cancer Father         colon    Heart Disease Father     Colon Cancer Father 78    Substance Abuse Father         cigerrette smoker & wore oxygen.  Lupus Mother     Immunodeficiency Brother         HIV    Other Brother         motor vehicle accident.  Heart Attack Maternal Grandfather 72    Diabetes Paternal Grandfather     Heart Attack Paternal Grandfather        REVIEW OF SYSTEMS:  CONSTITUTIONAL:  negative for  fevers, chills  EYES:  negative for vision changes from baseline  HEENT:  Expressive aphasia  RESPIRATORY:  negative for SOB  CARDIOVASCULAR:  negative for acute chest pains  GASTROINTESTINAL:  negative for nausea, vomiting  HEMATOLOGIC/LYMPHATIC:  negative for bleeding  MUSCULOSKELETAL:  positive for right hip pain  NEUROLOGICAL:  negative for headaches, dizziness  BEHAVIOR/PSYCH:  negative for increased agitation and anxiety    PHYSICAL EXAM:    VITALS:  BP (!) 157/73   Pulse 84   Temp 98.2 °F (36.8 °C) (Oral)   Resp 18   Ht 6' (1.829 m)   Wt 157 lb (71.2 kg)   SpO2 95%   BMI 21.29 kg/m²   CONSTITUTIONAL:  Very pleasant, alert, communicates with small sentences  MUSCULOSKELETAL:  Right lower Extremity:  Shortened and externally rotated  +Log roll  + Heel Strike  -TTP over Knee and Ankle  Skin intact with no signs of degloving  Compartments soft and compressible, calf non-tender  +DP & PT pulses, Brisk Cap refill, Toes warm and perfused  Sensation grossly intact superficial/deep peroneal,saphenous,sural,tibial n. distributions  · No movement of the foot including wiggling toes, PF, DF or EHL. He is unable to extend his knee.   This is baseline for him    Secondary Exam: bilateralUE: No obvious signs of trauma. -TTP to fingers, hand, wrist, forearm, elbow, humerus, shoulder or clavicle. · leftLE: No obvious signs of trauma. -TTP to foot, ankle, leg, knee, thigh, hip. · Pelvis: -TTP, -Log roll, -Heel strike     DATA:    CBC:   Lab Results   Component Value Date    WBC 10.4 07/11/2020    RBC 3.40 07/11/2020    HGB 11.5 07/11/2020    HCT 34.5 07/11/2020    .5 07/11/2020    MCH 33.8 07/11/2020    MCHC 33.3 07/11/2020    RDW 13.2 07/11/2020     07/11/2020    MPV 12.6 07/11/2020     PT/INR:    Lab Results   Component Value Date    PROTIME 13.2 07/11/2020    INR 1.2 07/11/2020     Lactic Acid :   Lab Results   Component Value Date    LACTA 1.9 09/22/2012       Radiology Review:  07/11/20 - XR Pelvis right hip  There is an intertrochanteric fracture of the right hip that is shortened and displaced minimally. There is a small subtrochanteric extension component to this fracture pattern. Poor bone quality.     IMPRESSION:   · Closed, Right Intertrochanteric Fracture    PLAN:  Plan for surgery with Dr. Jt Francis on 7/12/20, 8 am  NPO midnight, Medical admission  RLE Non-weight bearing  Pre-op Labs and Imaging  Pain control IV and PO  Hold Anticoagulation   Will continue to follow  Review and discussed with Dr. Jt Francis

## 2020-07-11 NOTE — PLAN OF CARE
Problem: Pain:  Goal: Pain level will decrease  Description: Pain level will decrease  Outcome: Met This Shift     Problem: Pain:  Goal: Control of acute pain  Description: Control of acute pain  Outcome: Met This Shift     Problem: Falls - Risk of:  Goal: Absence of physical injury  Description: Absence of physical injury  Outcome: Met This Shift     Problem: Skin Integrity:  Goal: Will show no infection signs and symptoms  Description: Will show no infection signs and symptoms  Outcome: Met This Shift     Problem: Skin Integrity:  Goal: Absence of new skin breakdown  Description: Absence of new skin breakdown  Outcome: Met This Shift

## 2020-07-12 ENCOUNTER — ANESTHESIA EVENT (OUTPATIENT)
Dept: OPERATING ROOM | Age: 70
DRG: 481 | End: 2020-07-12
Payer: MEDICARE

## 2020-07-12 ENCOUNTER — ANESTHESIA (OUTPATIENT)
Dept: OPERATING ROOM | Age: 70
DRG: 481 | End: 2020-07-12
Payer: MEDICARE

## 2020-07-12 ENCOUNTER — APPOINTMENT (OUTPATIENT)
Dept: GENERAL RADIOLOGY | Age: 70
DRG: 481 | End: 2020-07-12
Payer: MEDICARE

## 2020-07-12 VITALS
RESPIRATION RATE: 1 BRPM | DIASTOLIC BLOOD PRESSURE: 53 MMHG | OXYGEN SATURATION: 97 % | SYSTOLIC BLOOD PRESSURE: 84 MMHG | TEMPERATURE: 96.6 F

## 2020-07-12 LAB
ALBUMIN SERPL-MCNC: 3.5 G/DL (ref 3.5–5.2)
ALP BLD-CCNC: 96 U/L (ref 40–129)
ALT SERPL-CCNC: 20 U/L (ref 0–40)
ANION GAP SERPL CALCULATED.3IONS-SCNC: 8 MMOL/L (ref 7–16)
AST SERPL-CCNC: 11 U/L (ref 0–39)
BASOPHILS ABSOLUTE: 0 E9/L (ref 0–0.2)
BASOPHILS RELATIVE PERCENT: 0 % (ref 0–2)
BILIRUB SERPL-MCNC: 0.5 MG/DL (ref 0–1.2)
BUN BLDV-MCNC: 17 MG/DL (ref 8–23)
CALCIUM SERPL-MCNC: 9.3 MG/DL (ref 8.6–10.2)
CHLORIDE BLD-SCNC: 104 MMOL/L (ref 98–107)
CHOLESTEROL, TOTAL: 64 MG/DL (ref 0–199)
CK MB: <1 NG/ML (ref 0–7.7)
CO2: 27 MMOL/L (ref 22–29)
CREAT SERPL-MCNC: 0.5 MG/DL (ref 0.7–1.2)
EOSINOPHILS ABSOLUTE: 0 E9/L (ref 0.05–0.5)
EOSINOPHILS RELATIVE PERCENT: 0 % (ref 0–6)
GFR AFRICAN AMERICAN: >60
GFR NON-AFRICAN AMERICAN: >60 ML/MIN/1.73
GLUCOSE BLD-MCNC: 141 MG/DL (ref 74–99)
HBA1C MFR BLD: 5.2 % (ref 4–5.6)
HCT VFR BLD CALC: 31.1 % (ref 37–54)
HDLC SERPL-MCNC: 35 MG/DL
HEMOGLOBIN: 10.1 G/DL (ref 12.5–16.5)
IMMATURE GRANULOCYTES #: 0.02 E9/L
IMMATURE GRANULOCYTES %: 0.3 % (ref 0–5)
LDL CHOLESTEROL CALCULATED: 22 MG/DL (ref 0–99)
LYMPHOCYTES ABSOLUTE: 0.88 E9/L (ref 1.5–4)
LYMPHOCYTES RELATIVE PERCENT: 12.1 % (ref 20–42)
MAGNESIUM: 2.1 MG/DL (ref 1.6–2.6)
MCH RBC QN AUTO: 33.4 PG (ref 26–35)
MCHC RBC AUTO-ENTMCNC: 32.5 % (ref 32–34.5)
MCV RBC AUTO: 103 FL (ref 80–99.9)
MONOCYTES ABSOLUTE: 0.77 E9/L (ref 0.1–0.95)
MONOCYTES RELATIVE PERCENT: 10.6 % (ref 2–12)
NEUTROPHILS ABSOLUTE: 5.59 E9/L (ref 1.8–7.3)
NEUTROPHILS RELATIVE PERCENT: 77 % (ref 43–80)
PDW BLD-RTO: 13.5 FL (ref 11.5–15)
PHOSPHORUS: 2.8 MG/DL (ref 2.5–4.5)
PLATELET # BLD: 165 E9/L (ref 130–450)
PMV BLD AUTO: 12.7 FL (ref 7–12)
POTASSIUM SERPL-SCNC: 3.8 MMOL/L (ref 3.5–5)
RBC # BLD: 3.02 E12/L (ref 3.8–5.8)
SODIUM BLD-SCNC: 139 MMOL/L (ref 132–146)
TOTAL CK: 20 U/L (ref 20–200)
TOTAL PROTEIN: 6.5 G/DL (ref 6.4–8.3)
TRIGL SERPL-MCNC: 35 MG/DL (ref 0–149)
TROPONIN: <0.01 NG/ML (ref 0–0.03)
TSH SERPL DL<=0.05 MIU/L-ACNC: 0.52 UIU/ML (ref 0.27–4.2)
VLDLC SERPL CALC-MCNC: 7 MG/DL
WBC # BLD: 7.3 E9/L (ref 4.5–11.5)

## 2020-07-12 PROCEDURE — C1769 GUIDE WIRE: HCPCS | Performed by: ORTHOPAEDIC SURGERY

## 2020-07-12 PROCEDURE — 7100000000 HC PACU RECOVERY - FIRST 15 MIN: Performed by: ORTHOPAEDIC SURGERY

## 2020-07-12 PROCEDURE — 36415 COLL VENOUS BLD VENIPUNCTURE: CPT

## 2020-07-12 PROCEDURE — 3700000000 HC ANESTHESIA ATTENDED CARE: Performed by: ORTHOPAEDIC SURGERY

## 2020-07-12 PROCEDURE — 6360000002 HC RX W HCPCS: Performed by: INTERNAL MEDICINE

## 2020-07-12 PROCEDURE — 6360000002 HC RX W HCPCS: Performed by: NURSE ANESTHETIST, CERTIFIED REGISTERED

## 2020-07-12 PROCEDURE — 3600000002 HC SURGERY LEVEL 2 BASE: Performed by: ORTHOPAEDIC SURGERY

## 2020-07-12 PROCEDURE — 2709999900 HC NON-CHARGEABLE SUPPLY: Performed by: ORTHOPAEDIC SURGERY

## 2020-07-12 PROCEDURE — 82553 CREATINE MB FRACTION: CPT

## 2020-07-12 PROCEDURE — C1713 ANCHOR/SCREW BN/BN,TIS/BN: HCPCS | Performed by: ORTHOPAEDIC SURGERY

## 2020-07-12 PROCEDURE — 84443 ASSAY THYROID STIM HORMONE: CPT

## 2020-07-12 PROCEDURE — 83036 HEMOGLOBIN GLYCOSYLATED A1C: CPT

## 2020-07-12 PROCEDURE — 3700000001 HC ADD 15 MINUTES (ANESTHESIA): Performed by: ORTHOPAEDIC SURGERY

## 2020-07-12 PROCEDURE — 84484 ASSAY OF TROPONIN QUANT: CPT

## 2020-07-12 PROCEDURE — 3600000012 HC SURGERY LEVEL 2 ADDTL 15MIN: Performed by: ORTHOPAEDIC SURGERY

## 2020-07-12 PROCEDURE — 2500000003 HC RX 250 WO HCPCS: Performed by: NURSE ANESTHETIST, CERTIFIED REGISTERED

## 2020-07-12 PROCEDURE — 85025 COMPLETE CBC W/AUTO DIFF WBC: CPT

## 2020-07-12 PROCEDURE — 83735 ASSAY OF MAGNESIUM: CPT

## 2020-07-12 PROCEDURE — 6370000000 HC RX 637 (ALT 250 FOR IP): Performed by: ORTHOPAEDIC SURGERY

## 2020-07-12 PROCEDURE — 27245 TREAT THIGH FRACTURE: CPT | Performed by: ORTHOPAEDIC SURGERY

## 2020-07-12 PROCEDURE — 6360000002 HC RX W HCPCS: Performed by: ORTHOPAEDIC SURGERY

## 2020-07-12 PROCEDURE — 2720000010 HC SURG SUPPLY STERILE: Performed by: ORTHOPAEDIC SURGERY

## 2020-07-12 PROCEDURE — 2580000003 HC RX 258: Performed by: NURSE ANESTHETIST, CERTIFIED REGISTERED

## 2020-07-12 PROCEDURE — 80053 COMPREHEN METABOLIC PANEL: CPT

## 2020-07-12 PROCEDURE — 96372 THER/PROPH/DIAG INJ SC/IM: CPT

## 2020-07-12 PROCEDURE — 96375 TX/PRO/DX INJ NEW DRUG ADDON: CPT

## 2020-07-12 PROCEDURE — 80061 LIPID PANEL: CPT

## 2020-07-12 PROCEDURE — 7100000001 HC PACU RECOVERY - ADDTL 15 MIN: Performed by: ORTHOPAEDIC SURGERY

## 2020-07-12 PROCEDURE — 3209999900 FLUORO FOR SURGICAL PROCEDURES

## 2020-07-12 PROCEDURE — 6360000002 HC RX W HCPCS: Performed by: STUDENT IN AN ORGANIZED HEALTH CARE EDUCATION/TRAINING PROGRAM

## 2020-07-12 PROCEDURE — 1200000000 HC SEMI PRIVATE

## 2020-07-12 PROCEDURE — 0QS634Z REPOSITION RIGHT UPPER FEMUR WITH INTERNAL FIXATION DEVICE, PERCUTANEOUS APPROACH: ICD-10-PCS | Performed by: ORTHOPAEDIC SURGERY

## 2020-07-12 PROCEDURE — 2580000003 HC RX 258: Performed by: ORTHOPAEDIC SURGERY

## 2020-07-12 PROCEDURE — 96376 TX/PRO/DX INJ SAME DRUG ADON: CPT

## 2020-07-12 PROCEDURE — 82550 ASSAY OF CK (CPK): CPT

## 2020-07-12 PROCEDURE — 84100 ASSAY OF PHOSPHORUS: CPT

## 2020-07-12 DEVICE — SCREW BNE L105MM DIA10.35MM G TI CANN PERF FOR PROX FEM: Type: IMPLANTABLE DEVICE | Site: HIP | Status: FUNCTIONAL

## 2020-07-12 DEVICE — NAIL IM L400MM DIA11MM 125DEG LNG GRN R PROX FEM TI: Type: IMPLANTABLE DEVICE | Site: HIP | Status: FUNCTIONAL

## 2020-07-12 DEVICE — SCREW BNE L42MM DIA5MM TIB LT GRN TI ST CANN LOK FULL THRD: Type: IMPLANTABLE DEVICE | Site: HIP | Status: FUNCTIONAL

## 2020-07-12 RX ORDER — CEFAZOLIN SODIUM 2 G/50ML
2 SOLUTION INTRAVENOUS
Status: COMPLETED | OUTPATIENT
Start: 2020-07-12 | End: 2020-07-12

## 2020-07-12 RX ORDER — SENNA AND DOCUSATE SODIUM 50; 8.6 MG/1; MG/1
1 TABLET, FILM COATED ORAL 2 TIMES DAILY
Status: DISCONTINUED | OUTPATIENT
Start: 2020-07-12 | End: 2020-07-15 | Stop reason: HOSPADM

## 2020-07-12 RX ORDER — HEPARIN SODIUM 5000 [USP'U]/ML
5000 INJECTION, SOLUTION INTRAVENOUS; SUBCUTANEOUS EVERY 12 HOURS
Status: DISCONTINUED | OUTPATIENT
Start: 2020-07-12 | End: 2020-07-15 | Stop reason: HOSPADM

## 2020-07-12 RX ORDER — LIDOCAINE HYDROCHLORIDE 20 MG/ML
INJECTION, SOLUTION INTRAVENOUS PRN
Status: DISCONTINUED | OUTPATIENT
Start: 2020-07-12 | End: 2020-07-12 | Stop reason: SDUPTHER

## 2020-07-12 RX ORDER — SODIUM CHLORIDE 0.9 % (FLUSH) 0.9 %
10 SYRINGE (ML) INJECTION EVERY 12 HOURS SCHEDULED
Status: DISCONTINUED | OUTPATIENT
Start: 2020-07-12 | End: 2020-07-15 | Stop reason: HOSPADM

## 2020-07-12 RX ORDER — MORPHINE SULFATE 2 MG/ML
2 INJECTION, SOLUTION INTRAMUSCULAR; INTRAVENOUS EVERY 5 MIN PRN
Status: DISCONTINUED | OUTPATIENT
Start: 2020-07-12 | End: 2020-07-12 | Stop reason: HOSPADM

## 2020-07-12 RX ORDER — ONDANSETRON 2 MG/ML
INJECTION INTRAMUSCULAR; INTRAVENOUS PRN
Status: DISCONTINUED | OUTPATIENT
Start: 2020-07-12 | End: 2020-07-12 | Stop reason: SDUPTHER

## 2020-07-12 RX ORDER — OXYCODONE HYDROCHLORIDE 5 MG/1
5 TABLET ORAL EVERY 4 HOURS PRN
Status: DISCONTINUED | OUTPATIENT
Start: 2020-07-12 | End: 2020-07-15 | Stop reason: HOSPADM

## 2020-07-12 RX ORDER — ASPIRIN 81 MG/1
81 TABLET, CHEWABLE ORAL NIGHTLY
Status: DISCONTINUED | OUTPATIENT
Start: 2020-07-12 | End: 2020-07-15 | Stop reason: HOSPADM

## 2020-07-12 RX ORDER — CLOPIDOGREL BISULFATE 75 MG/1
75 TABLET ORAL NIGHTLY
Status: DISCONTINUED | OUTPATIENT
Start: 2020-07-12 | End: 2020-07-15 | Stop reason: HOSPADM

## 2020-07-12 RX ORDER — MORPHINE SULFATE 4 MG/ML
4 INJECTION, SOLUTION INTRAMUSCULAR; INTRAVENOUS
Status: DISCONTINUED | OUTPATIENT
Start: 2020-07-12 | End: 2020-07-14

## 2020-07-12 RX ORDER — SODIUM CHLORIDE 0.9 % (FLUSH) 0.9 %
10 SYRINGE (ML) INJECTION PRN
Status: DISCONTINUED | OUTPATIENT
Start: 2020-07-12 | End: 2020-07-15 | Stop reason: HOSPADM

## 2020-07-12 RX ORDER — PROMETHAZINE HYDROCHLORIDE 25 MG/ML
6.25 INJECTION, SOLUTION INTRAMUSCULAR; INTRAVENOUS
Status: DISCONTINUED | OUTPATIENT
Start: 2020-07-12 | End: 2020-07-12 | Stop reason: HOSPADM

## 2020-07-12 RX ORDER — EPHEDRINE SULFATE/0.9% NACL/PF 50 MG/5 ML
SYRINGE (ML) INTRAVENOUS PRN
Status: DISCONTINUED | OUTPATIENT
Start: 2020-07-12 | End: 2020-07-12 | Stop reason: SDUPTHER

## 2020-07-12 RX ORDER — CEFAZOLIN SODIUM 2 G/50ML
SOLUTION INTRAVENOUS
Status: COMPLETED
Start: 2020-07-12 | End: 2020-07-12

## 2020-07-12 RX ORDER — KETOROLAC TROMETHAMINE 30 MG/ML
INJECTION, SOLUTION INTRAMUSCULAR; INTRAVENOUS PRN
Status: DISCONTINUED | OUTPATIENT
Start: 2020-07-12 | End: 2020-07-12 | Stop reason: SDUPTHER

## 2020-07-12 RX ORDER — FENTANYL CITRATE 50 UG/ML
INJECTION, SOLUTION INTRAMUSCULAR; INTRAVENOUS PRN
Status: DISCONTINUED | OUTPATIENT
Start: 2020-07-12 | End: 2020-07-12 | Stop reason: SDUPTHER

## 2020-07-12 RX ORDER — SODIUM CHLORIDE 9 MG/ML
INJECTION, SOLUTION INTRAVENOUS CONTINUOUS PRN
Status: DISCONTINUED | OUTPATIENT
Start: 2020-07-12 | End: 2020-07-12 | Stop reason: SDUPTHER

## 2020-07-12 RX ORDER — ROCURONIUM BROMIDE 10 MG/ML
INJECTION, SOLUTION INTRAVENOUS PRN
Status: DISCONTINUED | OUTPATIENT
Start: 2020-07-12 | End: 2020-07-12 | Stop reason: SDUPTHER

## 2020-07-12 RX ORDER — ONDANSETRON 2 MG/ML
4 INJECTION INTRAMUSCULAR; INTRAVENOUS
Status: DISCONTINUED | OUTPATIENT
Start: 2020-07-12 | End: 2020-07-12 | Stop reason: HOSPADM

## 2020-07-12 RX ORDER — SODIUM CHLORIDE, SODIUM LACTATE, POTASSIUM CHLORIDE, CALCIUM CHLORIDE 600; 310; 30; 20 MG/100ML; MG/100ML; MG/100ML; MG/100ML
INJECTION, SOLUTION INTRAVENOUS CONTINUOUS PRN
Status: DISCONTINUED | OUTPATIENT
Start: 2020-07-12 | End: 2020-07-12 | Stop reason: SDUPTHER

## 2020-07-12 RX ORDER — MEPERIDINE HYDROCHLORIDE 25 MG/ML
12.5 INJECTION INTRAMUSCULAR; INTRAVENOUS; SUBCUTANEOUS EVERY 5 MIN PRN
Status: DISCONTINUED | OUTPATIENT
Start: 2020-07-12 | End: 2020-07-12 | Stop reason: HOSPADM

## 2020-07-12 RX ORDER — DEXAMETHASONE SODIUM PHOSPHATE 10 MG/ML
INJECTION, SOLUTION INTRAMUSCULAR; INTRAVENOUS PRN
Status: DISCONTINUED | OUTPATIENT
Start: 2020-07-12 | End: 2020-07-12 | Stop reason: SDUPTHER

## 2020-07-12 RX ORDER — OXYCODONE HYDROCHLORIDE 5 MG/1
10 TABLET ORAL EVERY 4 HOURS PRN
Status: DISCONTINUED | OUTPATIENT
Start: 2020-07-12 | End: 2020-07-15 | Stop reason: HOSPADM

## 2020-07-12 RX ORDER — PROPOFOL 10 MG/ML
INJECTION, EMULSION INTRAVENOUS PRN
Status: DISCONTINUED | OUTPATIENT
Start: 2020-07-12 | End: 2020-07-12 | Stop reason: SDUPTHER

## 2020-07-12 RX ORDER — MORPHINE SULFATE 2 MG/ML
2 INJECTION, SOLUTION INTRAMUSCULAR; INTRAVENOUS
Status: DISCONTINUED | OUTPATIENT
Start: 2020-07-12 | End: 2020-07-14

## 2020-07-12 RX ORDER — CEFAZOLIN SODIUM 2 G/50ML
2 SOLUTION INTRAVENOUS EVERY 8 HOURS
Status: COMPLETED | OUTPATIENT
Start: 2020-07-12 | End: 2020-07-13

## 2020-07-12 RX ADMIN — ASPIRIN 81 MG 81 MG: 81 TABLET ORAL at 21:14

## 2020-07-12 RX ADMIN — KETOROLAC TROMETHAMINE 15 MG: 15 INJECTION, SOLUTION INTRAMUSCULAR; INTRAVENOUS at 03:26

## 2020-07-12 RX ADMIN — FENTANYL CITRATE 50 MCG: 50 INJECTION, SOLUTION INTRAMUSCULAR; INTRAVENOUS at 09:36

## 2020-07-12 RX ADMIN — PHENYLEPHRINE HYDROCHLORIDE 100 MCG: 10 INJECTION INTRAVENOUS at 08:55

## 2020-07-12 RX ADMIN — FENTANYL CITRATE 25 MCG: 50 INJECTION, SOLUTION INTRAMUSCULAR; INTRAVENOUS at 00:12

## 2020-07-12 RX ADMIN — FENTANYL CITRATE 50 MCG: 50 INJECTION, SOLUTION INTRAMUSCULAR; INTRAVENOUS at 09:28

## 2020-07-12 RX ADMIN — DOCUSATE SODIUM AND SENNOSIDES 1 TABLET: 8.6; 5 TABLET, FILM COATED ORAL at 21:14

## 2020-07-12 RX ADMIN — PROPOFOL 150 MG: 10 INJECTION, EMULSION INTRAVENOUS at 08:44

## 2020-07-12 RX ADMIN — FENTANYL CITRATE 25 MCG: 50 INJECTION, SOLUTION INTRAMUSCULAR; INTRAVENOUS at 02:14

## 2020-07-12 RX ADMIN — PHENYLEPHRINE HYDROCHLORIDE 200 MCG: 10 INJECTION INTRAVENOUS at 09:04

## 2020-07-12 RX ADMIN — LIDOCAINE HYDROCHLORIDE 50 MG: 20 INJECTION, SOLUTION INTRAVENOUS at 08:44

## 2020-07-12 RX ADMIN — PHENYLEPHRINE HYDROCHLORIDE 100 MCG: 10 INJECTION INTRAVENOUS at 09:52

## 2020-07-12 RX ADMIN — FENTANYL CITRATE 50 MCG: 50 INJECTION, SOLUTION INTRAMUSCULAR; INTRAVENOUS at 10:11

## 2020-07-12 RX ADMIN — ROCURONIUM BROMIDE 20 MG: 10 SOLUTION INTRAVENOUS at 08:44

## 2020-07-12 RX ADMIN — OXYCODONE HYDROCHLORIDE 10 MG: 5 TABLET ORAL at 18:15

## 2020-07-12 RX ADMIN — CLOPIDOGREL 75 MG: 75 TABLET, FILM COATED ORAL at 21:14

## 2020-07-12 RX ADMIN — CEFAZOLIN SODIUM 2 G: 2 SOLUTION INTRAVENOUS at 17:05

## 2020-07-12 RX ADMIN — DESMOPRESSIN ACETATE 40 MG: 0.2 TABLET ORAL at 21:14

## 2020-07-12 RX ADMIN — OXYCODONE HYDROCHLORIDE 5 MG: 5 TABLET ORAL at 22:31

## 2020-07-12 RX ADMIN — SODIUM CHLORIDE, POTASSIUM CHLORIDE, SODIUM LACTATE AND CALCIUM CHLORIDE: 600; 310; 30; 20 INJECTION, SOLUTION INTRAVENOUS at 09:14

## 2020-07-12 RX ADMIN — PHENYLEPHRINE HYDROCHLORIDE 200 MCG: 10 INJECTION INTRAVENOUS at 08:46

## 2020-07-12 RX ADMIN — CEFAZOLIN SODIUM 2 G: 2 SOLUTION INTRAVENOUS at 08:51

## 2020-07-12 RX ADMIN — KETOROLAC TROMETHAMINE 15 MG: 30 INJECTION, SOLUTION INTRAMUSCULAR at 10:13

## 2020-07-12 RX ADMIN — FENTANYL CITRATE 100 MCG: 50 INJECTION, SOLUTION INTRAMUSCULAR; INTRAVENOUS at 08:44

## 2020-07-12 RX ADMIN — PHENYLEPHRINE HYDROCHLORIDE 200 MCG: 10 INJECTION INTRAVENOUS at 08:52

## 2020-07-12 RX ADMIN — FENTANYL CITRATE 25 MCG: 50 INJECTION, SOLUTION INTRAMUSCULAR; INTRAVENOUS at 05:18

## 2020-07-12 RX ADMIN — ONDANSETRON 4 MG: 2 INJECTION INTRAMUSCULAR; INTRAVENOUS at 08:44

## 2020-07-12 RX ADMIN — Medication 20 MG: at 09:22

## 2020-07-12 RX ADMIN — SODIUM CHLORIDE: 9 INJECTION, SOLUTION INTRAVENOUS at 08:36

## 2020-07-12 RX ADMIN — FENTANYL CITRATE 25 MCG: 50 INJECTION, SOLUTION INTRAMUSCULAR; INTRAVENOUS at 07:25

## 2020-07-12 RX ADMIN — OXYCODONE HYDROCHLORIDE 5 MG: 5 TABLET ORAL at 14:13

## 2020-07-12 RX ADMIN — Medication 10 ML: at 21:23

## 2020-07-12 RX ADMIN — DEXAMETHASONE SODIUM PHOSPHATE 4 MG: 10 INJECTION, SOLUTION INTRAMUSCULAR; INTRAVENOUS at 08:44

## 2020-07-12 ASSESSMENT — PULMONARY FUNCTION TESTS
PIF_VALUE: 14
PIF_VALUE: 3
PIF_VALUE: 2
PIF_VALUE: 13
PIF_VALUE: 3
PIF_VALUE: 3
PIF_VALUE: 14
PIF_VALUE: 13
PIF_VALUE: 0
PIF_VALUE: 2
PIF_VALUE: 3
PIF_VALUE: 3
PIF_VALUE: 12
PIF_VALUE: 13
PIF_VALUE: 14
PIF_VALUE: 2
PIF_VALUE: 5
PIF_VALUE: 13
PIF_VALUE: 3
PIF_VALUE: 2
PIF_VALUE: 4
PIF_VALUE: 13
PIF_VALUE: 13
PIF_VALUE: 3
PIF_VALUE: 13
PIF_VALUE: 2
PIF_VALUE: 1
PIF_VALUE: 14
PIF_VALUE: 2
PIF_VALUE: 4
PIF_VALUE: 13
PIF_VALUE: 14
PIF_VALUE: 2
PIF_VALUE: 3
PIF_VALUE: 13
PIF_VALUE: 13
PIF_VALUE: 3
PIF_VALUE: 3
PIF_VALUE: 14
PIF_VALUE: 3
PIF_VALUE: 14
PIF_VALUE: 3
PIF_VALUE: 2
PIF_VALUE: 13
PIF_VALUE: 14
PIF_VALUE: 2
PIF_VALUE: 14
PIF_VALUE: 3
PIF_VALUE: 12
PIF_VALUE: 1
PIF_VALUE: 23
PIF_VALUE: 3
PIF_VALUE: 13
PIF_VALUE: 3
PIF_VALUE: 14
PIF_VALUE: 14
PIF_VALUE: 3
PIF_VALUE: 2
PIF_VALUE: 14
PIF_VALUE: 3
PIF_VALUE: 14
PIF_VALUE: 31
PIF_VALUE: 13
PIF_VALUE: 2
PIF_VALUE: 12
PIF_VALUE: 3
PIF_VALUE: 13
PIF_VALUE: 5
PIF_VALUE: 2
PIF_VALUE: 13
PIF_VALUE: 2
PIF_VALUE: 2
PIF_VALUE: 13
PIF_VALUE: 1
PIF_VALUE: 1
PIF_VALUE: 13
PIF_VALUE: 4
PIF_VALUE: 14
PIF_VALUE: 13
PIF_VALUE: 12
PIF_VALUE: 3
PIF_VALUE: 13
PIF_VALUE: 12
PIF_VALUE: 1
PIF_VALUE: 3
PIF_VALUE: 13
PIF_VALUE: 3
PIF_VALUE: 13
PIF_VALUE: 14
PIF_VALUE: 10
PIF_VALUE: 2
PIF_VALUE: 3
PIF_VALUE: 13
PIF_VALUE: 0
PIF_VALUE: 2
PIF_VALUE: 13
PIF_VALUE: 4

## 2020-07-12 ASSESSMENT — PAIN SCALES - GENERAL
PAINLEVEL_OUTOF10: 4
PAINLEVEL_OUTOF10: 0
PAINLEVEL_OUTOF10: 9
PAINLEVEL_OUTOF10: 6
PAINLEVEL_OUTOF10: 10
PAINLEVEL_OUTOF10: 10
PAINLEVEL_OUTOF10: 7
PAINLEVEL_OUTOF10: 10
PAINLEVEL_OUTOF10: 0
PAINLEVEL_OUTOF10: 0
PAINLEVEL_OUTOF10: 9
PAINLEVEL_OUTOF10: 10
PAINLEVEL_OUTOF10: 0

## 2020-07-12 ASSESSMENT — COPD QUESTIONNAIRES: CAT_SEVERITY: SEVERE

## 2020-07-12 ASSESSMENT — LIFESTYLE VARIABLES: SMOKING_STATUS: 1

## 2020-07-12 NOTE — ANESTHESIA POSTPROCEDURE EVALUATION
Department of Anesthesiology  Postprocedure Note    Patient: Dustin Garibay  MRN: [id number]  YOB: 1950  Date of evaluation: 7/12/2020  Time:  1:37 PM     Procedure Summary     Date:  07/12/20 Room / Location:  [address] [place] [address]    Anesthesia Start:  7931 Anesthesia Stop:  5935    Procedure:  FEMUR OPEN REDUCTION INTERNAL FIXATION (Right ) Diagnosis:  (fracture right hip)    Surgeon:  Florencio Grimaldo DO Responsible Provider:  Kian Cade MD    Anesthesia Type:  general ASA Status:  3          Anesthesia Type: general    Hansa Phase I: Hansa Score: 8    Hansa Phase II:      Last vitals: Reviewed and per EMR flowsheets.        Anesthesia Post Evaluation    Patient location during evaluation: PACU  Patient participation: complete - patient participated  Level of consciousness: awake  Airway patency: patent  Nausea & Vomiting: no nausea and no vomiting  Complications: no  Cardiovascular status: hemodynamically stable  Respiratory status: acceptable  Hydration status: euvolemic

## 2020-07-12 NOTE — PROGRESS NOTES
Internal Medicine Progress Note     [facility]     Lagrange Baldemar. Neeru Lopez., F.A.C.OKendalI. Daniel Olivia D.O., PRIYANKOMAME Obando, MSN, APRN, NP-C  Roberto Carlos Baino. Wilber Khan, MSN, APRN-CNP     Primary Care Physician: Yesy Granado DO   Admitting Physician:  Swetha Urena DO  Admission date and time: 7/11/2020 11:45 AM    Room:  [address] [place] [address]    Patient Name: Joseph Polanco  MRN: [id number]    Date of Service: 7/12/2020     Subjective:  Lourdes Estrella was seen and evaluated in the presence of his wife today. Plans are for orthopedic repair of the hip fracture today. Laboratory values and vital signs are otherwise stable. Aspirin and Plavix have been placed on hold. He continues to have significant pain. Review of System: HEENT:  Delmar ear pain, sore throat, sinus or eye problems  Cardiovascular:   Denies any chest pain, irregular heartbeats, or palpitations. Respiratory:   Denies shortness of breath, coughing, sputum production, hemoptysis, or wheezing. Gastrointestinal:   Denies nausea, vomiting, diarrhea, or constipation. Denies any abdominal pain. Extremities:   Right hip pain as to be expected in the setting of a displaced fracture. Neurology:    Chronic deficits related to previous CVA. Derm:    Denies any rashes, ulcers, or excoriations. Denies bruising. Genitourinary:    Denies any urgency, frequency, hematuria. Voiding without difficulty. Musculoskeletal:  Denies myalgias, joint complaints or back pain      Physical Exam:  No intake/output data recorded. Blood pressure 106/60, pulse 81, temperature 97.7 °F (36.5 °C), temperature source Oral, resp. rate 16, height 6' (1.829 m), weight 157 lb (71.2 kg), SpO2 92 %. HEENT:    PERRLA. EOMI. Sclera clear. Buccal mucosa moist.  Neck:    Supple. Trachea midline. No thyromegaly. No JVD. No bruits. Heart:    Rhythm regular, rate controlled. No murmurs.   Lungs: imaging result-6 mm nodular density within the right perihilar region-dedicated CT of the thorax is recommended-we will need obtained when patient stable. Plan:   Plans are for orthopedic repair of the right hip fracture today. Aspirin and Plavix have been placed on hold for this purpose and will be resumed tomorrow. Laboratory values and vital signs are being monitored accordingly. Questions were answered at the bedside. We will determine his progression with the therapy teams postoperatively to determine if the patient will be capable of returning home with home health care versus acute rehabilitation. Continue current therapy. See orders for further plan of care. More than 50% of my time was spent at the bedside counseling/coordinating care with the patient and/or family with face to face contact. This time was spent reviewing notes and laboratory data as well as instructing and counseling the patient. Time I spent with the family or surrogate(s) is included only if the patient was incapable of providing the necessary information or participating in medical decisions. I also discussed the differential diagnosis and all of the proposed management plans with the patient and individuals accompanying the patient. I am readily available for any further decision-making and intervention.        Rosangela Mendoza DO, F.A.C.O.I.  7/12/2020  8:07 AM

## 2020-07-12 NOTE — CARE COORDINATION
7/12/2020 0839 [facility]INO note: [facility] referral noted for \"possible [facility] vs [facility] following repair of hip fracture. \" Will await PT/OT evals/recommendation post op to assist with transition of care. [facility] to follow up Monday.  Betsy BURKETT

## 2020-07-12 NOTE — OP NOTE
Operative report        DATE OF PROCEDURE: 7/12/2020     SURGEON: Poli Matos    ASSISTANT: Ethan Morton Roles    PREOPERATIVE DIAGNOSIS: Comminuted inter-and subtrochanteric fracture right proximal femur    POSTOPERATIVE DIAGNOSIS: Same    OPERATION: Closed reduction with fluoroscopic guidance with long cephalo-medullary locked nail fixation    ANESTHESIA: General    ESTIMATED BLOOD LOSS: 096 cc    COMPLICATIONS: None    SPECIMENS: Was sent to pathology    HISTORY: The patient is a 72y.o. year old male with history of above preop diagnosis. I explained the risk, benefits, expected outcome, and alternatives to the procedure. Patient understands and is in agreement. PROCEDURE: The patient was brought the operating room after signs are identified. Adequate general anesthetic was administered on the bed by anesthesia. Patient was then placed supine on the fracture table and protective boots and wrappings were placed on the ankles and feet. He was positioned appropriately with appropriate soft and bony tissue protection. C arm was brought into position and position alignment were improved and excellent reduction was accomplished. The hip area was then prepped and draped in sterile fashion. The C-arm was then brought in position to guide placement of incisions for pin placement and fixation of the femur. The proximal starting hole was then made and a guidepin was placed in the tip of the greater trochanter and then moved as necessary to get ideal position in 3 dimensions. Afterwards the fracture was held reduced carefully while overreaming of the pin was done for the entry portion of the nail. A long guidepin was placed down to the superior pole of the patella and measured 400 mm for length of the nail. Reamers were then taken up to a size 12 to allow placement of an 11 mm nail. The neck intersect angle was estimated at 125 degrees and this was used to select the nail.   The 11 mm x 400 mm cephalo-medullary nail was chosen and advanced over the guidepin to the appropriate level for interlock. A lateral incision was made and the appropriate guide was placed to allow placement of this pin into the femoral head and neck. Position and alignment was improved as necessary to get it in the ideal position. Length was then measured at 110 mm and the reamer was set up 405 mm initially and then taken to 110 which allowed us to place the 105 mm lag screw without danger penetration. Position alignment was excellent. Proximal compression was done with the insertion device and the longitudinal interference screw was tightened down to allow sliding but no rotation. Final films were taken to document the fixation proximal.    Distal targeting was then done with the C arm and the more proximal round hole was used in the nail to lock distally. Appropriate position was achieved and depth was measured at 39 mm. A 42 mm screw was chosen to lock this. Final films showed excellent fixation. The wounds were then thoroughly irrigated and closed in layers. Stainless steel staples used in the skin. Aquasol AG dressing was applied. Patient's anesthetic was reversed he was taken recovery in stable condition. GROSS PATHOLOGY: Comminuted  Intertrochanteric fracture  with subtrochanteric extension right proximal femur    COMPONENTS USED : Synthes TFN a cephalo-medullary nail 400 mm length 11 mm diameter with 125 degree interlock angle. 105 mm lag screw. 42 mm distal locking screw. All reasonable efforts have been made to minimize the risk of errors that may occur in the use of voice recognition and other electronic means of charting.     Electronically signed by Kemp Leventhal, DO on 7/12/20 at 10:41 AM EDT

## 2020-07-12 NOTE — ANESTHESIA PRE PROCEDURE
Department of Anesthesiology  Preprocedure Note       Name:  Danny Wells   Age:  72 y.o.  :  1950                                          MRN:  [id number]         Date:  2020      Surgeon: Alexia Esparza):  SUREKHA Langford DO    Procedure: Procedure(s): FEMUR OPEN REDUCTION INTERNAL FIXATION    Medications prior to admission:   Prior to Admission medications    Medication Sig Start Date End Date Taking?  Authorizing Provider   aspirin 81 MG chewable tablet 81 mg by Per G Tube route nightly   Yes Historical Provider, MD   atorvastatin (LIPITOR) 40 MG tablet 40 mg by Per G Tube route nightly   Yes Historical Provider, MD   clopidogrel (PLAVIX) 75 MG tablet 75 mg by Per G Tube route nightly   Yes Historical Provider, MD   Heparin Sodium, Porcine, (HEPARIN, PORCINE,) 5000 UNIT/ML injection Inject 5,000 Units into the skin every 12 hours (pt due to start 19 @@ 2100) 19   Historical Provider, MD   polyethylene glycol (GLYCOLAX) powder Take 17 g by mouth daily 19   Historical Provider, MD   sodium chloride (OCEAN, BABY AYR) 0.65 % nasal spray 1 spray by Nasal route as needed for Congestion    Historical Provider, MD       Current medications:    Current Facility-Administered Medications   Medication Dose Route Frequency Provider Last Rate Last Dose    morphine (PF) injection 2 mg  2 mg Intravenous Q5 Min PRN Raymundo Vásquez MD        HYDROmorphone (DILAUDID) injection 0.5 mg  0.5 mg Intravenous Q5 Min PRN Raymundo Vásquez MD        ondansetron [facility]) injection 4 mg  4 mg Intravenous Once PRN Raymundo Vásquez MD        [facility] injection 6.25 mg  6.25 mg Intramuscular Once PRN Raymundo Vásquez MD        meperidine (DEMEROL) injection 12.5 mg  12.5 mg Intravenous Q5 Min PRN Raymundo Vásquez MD        sodium chloride flush 0.9 % injection 10 mL  10 mL Intravenous 2 times per day Warren Mayer DO   10 mL at 20    sodium chloride flush 0.9 % injection 10 mL  10 mL Intravenous PRN Zena Rosas Bisel, DO   10 mL at 07/11/20 1635    acetaminophen (TYLENOL) tablet 650 mg  650 mg Oral Q4H PRN Abdulaziz Evangelist Bisel, DO        ketorolac (TORADOL) injection 15 mg  15 mg Intravenous Q6H PRN Abdulaziz Blanca Bisel, DO   15 mg at 07/12/20 0326    fentaNYL (SUBLIMAZE) injection 25 mcg  25 mcg Intravenous Q2H PRN Singh Petersen, DO   25 mcg at 07/12/20 0725    atorvastatin (LIPITOR) tablet 40 mg  40 mg Per G Tube Nightly Abdulaziz Evangelist Bisel, DO   40 mg at 07/11/20 2129     Facility-Administered Medications Ordered in Other Encounters   Medication Dose Route Frequency Provider Last Rate Last Dose    filgrastim (NEUPOGEN) injection 300 mcg  300 mcg Subcutaneous Once Eugenia Alvarenga MD           Allergies:  No Known Allergies    Problem List:    Patient Active Problem List   Diagnosis Code    Esophageal cancer ([facility] [address] C15.9    Oral pharyngeal candidiasis B37.0    Emphysematous COPD ([facility] [address] J43.9    Mucositis due to antineoplastic therapy K12.31    Thrush of mouth and esophagus (HCC) B37.81, B37.0    Pancytopenia (HCC) D61.818    Febrile neutropenia (HCC) D70.9, R50.81    Acute ischemic cerebrovascular accident (CVA) involving left middle cerebral artery [facility]) Z13.847    Cerebrovascular accident (CVA) due to occlusion of middle cerebral artery ([address] I63.519    Dysphagia, oropharyngeal R13.12    Severe protein-calorie malnutrition ([address] E43    Carotid artery stenosis, symptomatic, right I65.21    Hypernatremia E87.0    Acute hypoxemic respiratory failure (HCC) J96.01    Painless hematuria R31.9    Right hip fracture ([address] S72.141A       Past Medical History:        Diagnosis Date    Cancer ([address] 06/25/2012    throat-underwent chemo and radiaiton    COPD (chronic obstructive pulmonary disease) ([address]     CVA (cerebral vascular accident) ([address]     right sided residual- uses a walker for ambulation       Past Surgical History:        Procedure Laterality Date    DENTAL SURGERY Bilateral 03/26/2018    FRACTURE SURGERY      right thumb    MOUTH BIOPSY  06/25/2012    right lateral pharynx biopsy. Social History:    Social History     Tobacco Use    Smoking status: Former Smoker     Packs/day: 1.50     Years: 40.00     Pack years: 60.00     Last attempt to quit: 1/15/2010     Years since quitting: 10.4    Smokeless tobacco: Never Used   Substance Use Topics    Alcohol use: No     Comment: occasional                                Counseling given: Not Answered      Vital Signs (Current):   Vitals:    07/11/20 1153 07/11/20 1500 07/11/20 1530 07/12/20 0645   BP: (!) 148/83 130/69 (!) 157/73 106/60   Pulse: 77 83 84 81   Resp: 18 18 18 16   Temp: 98.5 °F (36.9 °C) 98 °F (36.7 °C) 98.2 °F (36.8 °C) 97.7 °F (36.5 °C)   TempSrc: Axillary Oral Oral Oral   SpO2: 92% 94% 95% 92%   Weight: 157 lb (71.2 kg)      Height: 6' (1.829 m)                                                 BP Readings from Last 3 Encounters:   07/12/20 106/60   07/08/19 128/70   06/28/19 (!) 140/78       NPO Status: Time of last liquid consumption: 0000                        Time of last solid consumption: 0000                        Date of last liquid consumption: 07/04/20                        Date of last solid food consumption: 07/04/20    BMI:   Wt Readings from Last 3 Encounters:   07/11/20 157 lb (71.2 kg)   07/06/19 146 lb 3 oz (66.3 kg)   06/28/19 159 lb (72.1 kg)     Body mass index is 21.29 kg/m².     CBC:   Lab Results   Component Value Date    WBC 7.3 07/12/2020    RBC 3.02 07/12/2020    HGB 10.1 07/12/2020    HCT 31.1 07/12/2020    .0 07/12/2020    RDW 13.5 07/12/2020     07/12/2020       CMP:   Lab Results   Component Value Date     07/12/2020    K 3.8 07/12/2020    K 4.0 07/01/2019     07/12/2020    CO2 27 07/12/2020    BUN 17 07/12/2020    CREATININE 0.5 07/12/2020    GFRAA >60 07/12/2020    LABGLOM >60 07/12/2020    GLUCOSE 141 07/12/2020    PROT 6.5 07/12/2020    CALCIUM 9.3 07/12/2020    BILITOT 0.5 8

## 2020-07-13 ENCOUNTER — APPOINTMENT (OUTPATIENT)
Dept: GENERAL RADIOLOGY | Age: 70
DRG: 481 | End: 2020-07-13
Payer: MEDICARE

## 2020-07-13 ENCOUNTER — APPOINTMENT (OUTPATIENT)
Dept: CT IMAGING | Age: 70
DRG: 481 | End: 2020-07-13
Payer: MEDICARE

## 2020-07-13 LAB
ANION GAP SERPL CALCULATED.3IONS-SCNC: 8 MMOL/L (ref 7–16)
APPEARANCE FLUID: NORMAL
BASOPHILS ABSOLUTE: 0 E9/L (ref 0–0.2)
BASOPHILS RELATIVE PERCENT: 0.1 % (ref 0–2)
BUN BLDV-MCNC: 17 MG/DL (ref 8–23)
BURR CELLS: ABNORMAL
CALCIUM SERPL-MCNC: 8.6 MG/DL (ref 8.6–10.2)
CELL COUNT FLUID TYPE: NORMAL
CHLORIDE BLD-SCNC: 103 MMOL/L (ref 98–107)
CO2: 27 MMOL/L (ref 22–29)
COLOR FLUID: NORMAL
CREAT SERPL-MCNC: 0.5 MG/DL (ref 0.7–1.2)
EOSINOPHILS ABSOLUTE: 0 E9/L (ref 0.05–0.5)
EOSINOPHILS RELATIVE PERCENT: 0.1 % (ref 0–6)
FLUID TYPE: NORMAL
GFR AFRICAN AMERICAN: >60
GFR NON-AFRICAN AMERICAN: >60 ML/MIN/1.73
GLUCOSE BLD-MCNC: 126 MG/DL (ref 74–99)
GLUCOSE, FLUID: 68 MG/DL
HCT VFR BLD CALC: 23.7 % (ref 37–54)
HCT VFR BLD CALC: 24.1 % (ref 37–54)
HEMOGLOBIN: 7.8 G/DL (ref 12.5–16.5)
HEMOGLOBIN: 7.8 G/DL (ref 12.5–16.5)
LYMPHOCYTES ABSOLUTE: 0.41 E9/L (ref 1.5–4)
LYMPHOCYTES RELATIVE PERCENT: 5.2 % (ref 20–42)
MCH RBC QN AUTO: 33.8 PG (ref 26–35)
MCHC RBC AUTO-ENTMCNC: 32.4 % (ref 32–34.5)
MCV RBC AUTO: 104.3 FL (ref 80–99.9)
METAMYELOCYTES RELATIVE PERCENT: 0.9 % (ref 0–1)
MONOCYTE, FLUID: 32 %
MONOCYTES ABSOLUTE: 0.57 E9/L (ref 0.1–0.95)
MONOCYTES RELATIVE PERCENT: 7 % (ref 2–12)
NEUTROPHIL, FLUID: 68 %
NEUTROPHILS ABSOLUTE: 7.22 E9/L (ref 1.8–7.3)
NEUTROPHILS RELATIVE PERCENT: 87 % (ref 43–80)
NUCLEATED CELLS FLUID: 452 /UL
OVALOCYTES: ABNORMAL
PDW BLD-RTO: 13.5 FL (ref 11.5–15)
PLATELET # BLD: 127 E9/L (ref 130–450)
PMV BLD AUTO: 13 FL (ref 7–12)
POIKILOCYTES: ABNORMAL
POLYCHROMASIA: ABNORMAL
POTASSIUM SERPL-SCNC: 4.1 MMOL/L (ref 3.5–5)
RBC # BLD: 2.31 E12/L (ref 3.8–5.8)
RBC FLUID: NORMAL /UL
SCHISTOCYTES: ABNORMAL
SODIUM BLD-SCNC: 138 MMOL/L (ref 132–146)
WBC # BLD: 8.2 E9/L (ref 4.5–11.5)

## 2020-07-13 PROCEDURE — 1200000000 HC SEMI PRIVATE

## 2020-07-13 PROCEDURE — 97535 SELF CARE MNGMENT TRAINING: CPT

## 2020-07-13 PROCEDURE — 85014 HEMATOCRIT: CPT

## 2020-07-13 PROCEDURE — 6360000002 HC RX W HCPCS: Performed by: ORTHOPAEDIC SURGERY

## 2020-07-13 PROCEDURE — 87070 CULTURE OTHR SPECIMN AEROBIC: CPT

## 2020-07-13 PROCEDURE — 36415 COLL VENOUS BLD VENIPUNCTURE: CPT

## 2020-07-13 PROCEDURE — 71260 CT THORAX DX C+: CPT

## 2020-07-13 PROCEDURE — 96372 THER/PROPH/DIAG INJ SC/IM: CPT

## 2020-07-13 PROCEDURE — 2580000003 HC RX 258: Performed by: ORTHOPAEDIC SURGERY

## 2020-07-13 PROCEDURE — 73552 X-RAY EXAM OF FEMUR 2/>: CPT

## 2020-07-13 PROCEDURE — 6360000004 HC RX CONTRAST MEDICATION: Performed by: RADIOLOGY

## 2020-07-13 PROCEDURE — 87205 SMEAR GRAM STAIN: CPT

## 2020-07-13 PROCEDURE — 6370000000 HC RX 637 (ALT 250 FOR IP): Performed by: ORTHOPAEDIC SURGERY

## 2020-07-13 PROCEDURE — 85018 HEMOGLOBIN: CPT

## 2020-07-13 PROCEDURE — 80048 BASIC METABOLIC PNL TOTAL CA: CPT

## 2020-07-13 PROCEDURE — 89060 EXAM SYNOVIAL FLUID CRYSTALS: CPT

## 2020-07-13 PROCEDURE — 97161 PT EVAL LOW COMPLEX 20 MIN: CPT | Performed by: PHYSICAL THERAPIST

## 2020-07-13 PROCEDURE — 96376 TX/PRO/DX INJ SAME DRUG ADON: CPT

## 2020-07-13 PROCEDURE — 89051 BODY FLUID CELL COUNT: CPT

## 2020-07-13 PROCEDURE — 97116 GAIT TRAINING THERAPY: CPT

## 2020-07-13 PROCEDURE — 97165 OT EVAL LOW COMPLEX 30 MIN: CPT

## 2020-07-13 PROCEDURE — 97110 THERAPEUTIC EXERCISES: CPT | Performed by: PHYSICAL THERAPIST

## 2020-07-13 PROCEDURE — 85025 COMPLETE CBC W/AUTO DIFF WBC: CPT

## 2020-07-13 PROCEDURE — 97530 THERAPEUTIC ACTIVITIES: CPT | Performed by: PHYSICAL THERAPIST

## 2020-07-13 PROCEDURE — 97530 THERAPEUTIC ACTIVITIES: CPT

## 2020-07-13 PROCEDURE — 82947 ASSAY GLUCOSE BLOOD QUANT: CPT

## 2020-07-13 RX ORDER — OXYCODONE HYDROCHLORIDE 5 MG/1
5 TABLET ORAL EVERY 6 HOURS PRN
Qty: 28 TABLET | Refills: 0 | Status: SHIPPED | OUTPATIENT
Start: 2020-07-13 | End: 2020-07-20

## 2020-07-13 RX ADMIN — KETOROLAC TROMETHAMINE 15 MG: 15 INJECTION, SOLUTION INTRAMUSCULAR; INTRAVENOUS at 21:39

## 2020-07-13 RX ADMIN — DOCUSATE SODIUM AND SENNOSIDES 1 TABLET: 8.6; 5 TABLET, FILM COATED ORAL at 08:28

## 2020-07-13 RX ADMIN — Medication 10 ML: at 21:40

## 2020-07-13 RX ADMIN — IOPAMIDOL 75 ML: 755 INJECTION, SOLUTION INTRAVENOUS at 15:04

## 2020-07-13 RX ADMIN — ASPIRIN 81 MG 81 MG: 81 TABLET ORAL at 21:40

## 2020-07-13 RX ADMIN — OXYCODONE HYDROCHLORIDE 10 MG: 5 TABLET ORAL at 13:05

## 2020-07-13 RX ADMIN — OXYCODONE HYDROCHLORIDE 5 MG: 5 TABLET ORAL at 03:57

## 2020-07-13 RX ADMIN — HEPARIN SODIUM 5000 UNITS: 5000 INJECTION, SOLUTION INTRAVENOUS; SUBCUTANEOUS at 13:05

## 2020-07-13 RX ADMIN — OXYCODONE HYDROCHLORIDE 10 MG: 5 TABLET ORAL at 21:39

## 2020-07-13 RX ADMIN — CEFAZOLIN SODIUM 2 G: 2 SOLUTION INTRAVENOUS at 01:15

## 2020-07-13 RX ADMIN — OXYCODONE HYDROCHLORIDE 10 MG: 5 TABLET ORAL at 17:01

## 2020-07-13 RX ADMIN — CLOPIDOGREL 75 MG: 75 TABLET, FILM COATED ORAL at 21:40

## 2020-07-13 RX ADMIN — DOCUSATE SODIUM AND SENNOSIDES 1 TABLET: 8.6; 5 TABLET, FILM COATED ORAL at 21:40

## 2020-07-13 RX ADMIN — HEPARIN SODIUM 5000 UNITS: 5000 INJECTION, SOLUTION INTRAVENOUS; SUBCUTANEOUS at 01:29

## 2020-07-13 RX ADMIN — OXYCODONE HYDROCHLORIDE 10 MG: 5 TABLET ORAL at 08:24

## 2020-07-13 RX ADMIN — DESMOPRESSIN ACETATE 40 MG: 0.2 TABLET ORAL at 21:40

## 2020-07-13 RX ADMIN — KETOROLAC TROMETHAMINE 15 MG: 15 INJECTION, SOLUTION INTRAMUSCULAR; INTRAVENOUS at 10:33

## 2020-07-13 ASSESSMENT — PAIN DESCRIPTION - FREQUENCY: FREQUENCY: INTERMITTENT

## 2020-07-13 ASSESSMENT — PAIN SCALES - GENERAL
PAINLEVEL_OUTOF10: 3
PAINLEVEL_OUTOF10: 7
PAINLEVEL_OUTOF10: 4
PAINLEVEL_OUTOF10: 7
PAINLEVEL_OUTOF10: 2
PAINLEVEL_OUTOF10: 0
PAINLEVEL_OUTOF10: 8
PAINLEVEL_OUTOF10: 6

## 2020-07-13 ASSESSMENT — PAIN DESCRIPTION - LOCATION: LOCATION: HIP

## 2020-07-13 ASSESSMENT — PAIN DESCRIPTION - DESCRIPTORS: DESCRIPTORS: SHARP;DISCOMFORT;THROBBING

## 2020-07-13 ASSESSMENT — PAIN DESCRIPTION - PAIN TYPE: TYPE: SURGICAL PAIN

## 2020-07-13 ASSESSMENT — PAIN DESCRIPTION - ORIENTATION: ORIENTATION: RIGHT

## 2020-07-13 NOTE — PROGRESS NOTES
Department of Orthopedic Surgery  Resident Progress Note    Patient seen and examined this morning. Slightly confused, but oriented to self and place. Pain controlled. No new complaints. Denies chest pain, shortness of breath, calf pain, dizziness/lightheadedness. VITALS:  /62   Pulse 87   Temp 97.8 °F (36.6 °C) (Axillary)   Resp 16   Ht 6' (1.829 m)   Wt 157 lb (71.2 kg)   SpO2 99%   BMI 21.29 kg/m²     GENERAL: awake, AAx2  MUSCULOSKELETAL:   right lower extremity:  · Aquacel C/D/I  · Compartments soft and compressible, calf non-tender  · Palpable dorsalis pedis and posterior tibialis pulse, brisk cap refill to toes, foot warm and perfused  · Sensation intact to light touch in sural/deep peroneal/superficial peroneal/saphenous/posterior tibial nerve distributions to foot/ankle.   · Demonstrates foot drop on right side, -EHL, ankle ROM    Left upper extremity:  · Olecranon bursitis   · +flucutance, -warmth  · +TTP L elbow  · Compartments soft and compressible  · +AIN/PIN/Ulnar nerve function intact grossly  · +Radial pulse, Brisk Cap refill, hand warm and perfused  · Sensation intact to touch in radial/ulnar/median nerve distributions to hand        CBC:   Lab Results   Component Value Date    WBC 8.2 07/13/2020    HGB 7.8 07/13/2020    HCT 24.1 07/13/2020     07/13/2020       ASSESSMENT  · S/p ORIF R Intertrochanteric fx     PLAN    WBAT RLE  24 hour abx coverage  Deep venous thrombosis prophylaxis - aspirin, early mobilization  PT/OT  Pain Control: IV and PO  Monitor H&H 7.8/24.1  Plan for I&D L elbow bursitis today  · Discuss with Dr. Zamora Shoulder

## 2020-07-13 NOTE — PROGRESS NOTES
OCCUPATIONAL THERAPY  Initial Evaluation  Date:2020  Patient Name: Nancy Mata  MRN: [id number]  : 1950  ROOM #: 6332/8370-72     Referring Provider: Mesha Odom DO   Evaluating OT: James Pineda OTR/L [id number]    Placement Recommendation: Inpatient Rehab   Recommended Adaptive Equipment: none     [facility]   AM-PAC Daily Activity Inpatient   How much help for putting on and taking off regular lower body clothing?: Total  How much help for Bathing?: A Lot  How much help for Toileting?: Total  How much help for putting on and taking off regular upper body clothing?: A Lot  How much help for taking care of personal grooming?: A Lot  How much help for eating meals?: A Little  AM-PAC Inpatient Daily Activity Raw Score: 11  AM-PAC Inpatient ADL T-Scale Score : 29.04  ADL Inpatient CMS 0-100% Score: 70.42  ADL Inpatient CMS G-Code Modifier : CL    Diagnosis:   1. Closed 2-part intertrochanteric fracture of right femur, initial encounter ([facility]    2. Fracture, hip, right, closed, initial encounter [facility])      Pertinent Medical History:   Past Medical History:   Diagnosis Date    Cancer ([facility] 2012    throat-underwent chemo and radiaiton    [facility]     CVA (cerebral vascular accident) ([facility]     right sided residual- uses a walker for ambulation      Precautions:  falls, ORIF: R hip, Partial Weight Bearing: R LE, hx of CVA with R hemibody  Pain Scale: Numeric Rate: 8/10 in R hip; Nursing notified. Social history: with family: spouse     Home architecture: single family home, [facility], 1+1+3 steps to enter with rail to the last 3 steps, tub shower. PLOF: needs assistance with BADL and IADL   Equipment owned: quad cane, bedside commode, tub transfer bench, R UE sling d/t subluxation   Cognition: oriented x 3; follows 2 step directions.     fair  Problem solving skills   fair  Memory    fair  Sequencing  Communication: intact   Visual perceptual skills: intact Glasses: no   Edema: no     Sensation: intact   Hand Dominance:  Left     X Right     Left Right Comment   Passive range of motion WFL R hemibody    Active range of motion WFL  R hemibody     Muscle Grade 4/5 0/5    /pinch Strength Intact  Absent       Functional Assessment:   Initial Eval Status  Date: 7/13/2020 Treatment Status  Date: STGs = LTGs  Time frame: 5-7 days   Feeding Supervision  Independent    Grooming Moderate Assist   Independent    UB Dressing Maximal Assist   Minimal Assist    LB Dressing Dependent   Minimal Assist    Bathing Maximal Assist  Minimal Assist    Toileting Dependent   Minimal Assist    Bed Mobility  Supine to sit: Moderate Assist x 2  Scooting: Moderate Assist x 2  Sit to supine: Moderate Assist x 2   Supine to sit: Supervision   Sit to supine: Supervision    Functional Transfers Maximal assist x 2 from EOB to mellisa walker  Minimal Assist    Functional Mobility N/T   Modified [place]    Activity Tolerance fair  Good      Balance:   Sitting: good   Standing: fair with mellisa walker    Endurance: fair     Comments: Upon arrival to the room the patient was supine. At end of the session, the patient was in chair position in bed. Call light and phone within reach. All lines and tubes intact. Overall patient demonstrated decreased independence and safety during completion of ADL/functional transfer/mobility tasks. Pt would benefit from continued skilled OT to increase safety and independence with completion of ADL/IADL tasks for functional independence and quality of life. Treatment: Assistance needed to don R AFO and R UE sling d/t subluxation. Therapist facilitated bed mobility. Sitting balance at EOB to increase dynamic sitting balance and activity tolerance.  Transfer training with verbal cues to for hand placement throughout evaluation to improve safety, static standing balance with mellisa-walker x 2 reps to improve balance, pt assisted back to bed, assistance for rolling, [id number] 19    Therapeutic Exercise          [id number]     Manual Therapy                  41074     Neuro Re-ed                       90842     Orthotic manage/training    32732     Total Timed Treatment 27 2     Evaluation time includes thorough review of current medical information, gathering information on past medical history/social history and prior level of function, completion of standardized testing/informal observation of tasks, assessment of data, and development of POC/Goals.     Victory Ramón OTR/L [id number]

## 2020-07-13 NOTE — PROGRESS NOTES
midline. No thyromegaly. No JVD. No bruits. Heart:    Rhythm regular, rate controlled. No murmurs. Lungs:    Symmetrical. Clear to auscultation bilaterally. No wheezes. No rhonchi. No rales. Abdomen:   Soft. Non-tender. Non-distended. Bowel sounds positive. No organomegaly or masses. No pain on palpation  Extremities:    Peripheral pulses present. No peripheral edema. No ulcers. Neurologic:    Alert x3. Right upper extremity paresis is identified. Right lower extremity weakness is identified. Skin:    No petechia. No hemorrhage. Postoperative dressings were in place. Psych:   Behavior is normal. Mood appears normal. Speech is not rapid or pressured. Musculokeletal:  Spine ROM normal. Muscular strength intact. Gait not assessed.   Genitalia/Breast:  Deferred    Scheduled Meds:   clopidogrel  75 mg Per G Tube Nightly    aspirin  81 mg Per G Tube Nightly    heparin (porcine)  5,000 Units Subcutaneous Q12H    sodium chloride flush  10 mL Intravenous 2 times per day    sennosides-docusate sodium  1 tablet Oral BID    atorvastatin  40 mg Per G Tube Nightly       Objective Data:  CBC with Differential:    Lab Results   Component Value Date    WBC 8.2 07/13/2020    RBC 2.31 07/13/2020    HGB 7.8 07/13/2020    HCT 24.1 07/13/2020     07/13/2020    .3 07/13/2020    MCH 33.8 07/13/2020    MCHC 32.4 07/13/2020    RDW 13.5 07/13/2020    SEGSPCT 71 01/16/2014    METASPCT 0.9 07/13/2020    LYMPHOPCT 5.2 07/13/2020    MONOPCT 7.0 07/13/2020    MYELOPCT 2 09/27/2012    BASOPCT 0.1 07/13/2020    MONOSABS 0.57 07/13/2020    LYMPHSABS 0.41 07/13/2020    EOSABS 0.00 07/13/2020    BASOSABS 0.00 07/13/2020     BMP:    Lab Results   Component Value Date     07/13/2020    K 4.1 07/13/2020    K 4.0 07/01/2019     07/13/2020    CO2 27 07/13/2020    BUN 17 07/13/2020    LABALBU 3.5 07/12/2020    CREATININE 0.5 07/13/2020    CALCIUM 8.6 07/13/2020    GFRAA >60 07/13/2020    LABGLOM >60 07/13/2020 GLUCOSE 126 07/13/2020       Assessment:   1. Mechanical fall resulting in a displaced intratrochanteric fracture of the right hip status post orthopedic repair  2. Postoperative blood loss anemia  3. CVA with residual right-sided deficits  4. Dysphagia status post PEG tube placement  5. Non-oxygen dependent COPD with chronic respiratory failure  6. Hyperlipidemia  7. Abnormal imaging result-6 mm nodular density within the right perihilar region-dedicated CT of the thorax is recommended-we will need obtained when patient stable. Plan:   Hemoglobin has trended downward substantially since initial presentation to the hospital.  We will reassess the hemoglobin at noon today. If stable, we will resume antiplatelet therapy. The orthopedic team continues to follow. Home medications will be resumed. He will work with the therapy teams today to determine discharge needs. There may need to be consideration for acute rehab as his hip fracture is complicated by chronic deficits related to recent CVA. More than 50% of my time was spent at the bedside counseling/coordinating care with the patient and/or family with face to face contact. This time was spent reviewing notes and laboratory data as well as instructing and counseling the patient. Time I spent with the family or surrogate(s) is included only if the patient was incapable of providing the necessary information or participating in medical decisions. I also discussed the differential diagnosis and all of the proposed management plans with the patient and individuals accompanying the patient. I am readily available for any further decision-making and intervention.        Beverley Huston DO, [email or web address].  7/13/2020  7:38 AM

## 2020-07-13 NOTE — PROGRESS NOTES
OT BEDSIDE TREATMENT NOTE      Date:2020  Patient Name: Jef Camacho  MRN: [id number]  : 1950  Room: [address] [place] [address]     Referring Provider: DO Nasim Batista OT: Nidia MURPHY/L [id number]     Placement Recommendation: Inpatient Rehab   Recommended Adaptive Equipment: none      [facility]   AM-PAC Daily Activity Inpatient   How much help for putting on and taking off regular lower body clothing?: Total  How much help for Bathing?: A Lot  How much help for Toileting?: Total  How much help for putting on and taking off regular upper body clothing?: A Lot  How much help for taking care of personal grooming?: A Lot  How much help for eating meals?: A Little  AM-PAC Inpatient Daily Activity Raw Score: 11  AM-PAC Inpatient ADL T-Scale Score : 29.04  ADL Inpatient CMS 0-100% Score: 70.42  ADL Inpatient CMS G-Code Modifier : CL     Diagnosis:   1. Closed 2-part intertrochanteric fracture of right femur, initial encounter ([facility]    2. Fracture, hip, right, closed, initial encounter [facility])      Pertinent Medical History:   Past Medical History        Past Medical History:   Diagnosis Date    [facility]     throat-underwent chemo and radiaiton    COPD (chronic obstructive pulmonary disease) ([facility]      CVA (cerebral vascular accident) ([facility]       right sided residual- uses a walker for ambulation         Precautions:  falls, ORIF: R hip, Partial Weight Bearing: R LE, hx of CVA with R hemibody  Pain Scale: Numeric Rate: unquantified pain in R hip; Nursing aware. ; wife present at end of session     Social history: with family: spouse     Home architecture: single family [place] [facility], 1+1+3 steps to enter with rail to the last 3 steps, tub shower. PLOF: needs assistance with BADL and IADL   Equipment owned: quad cane, bedside commode, tub transfer bench, R UE sling d/t subluxation   Cognition: oriented x 3; follows 2 step directions.                fair  Problem solving skills fair  Memory               fair  Sequencing  Communication: intact   Visual perceptual skills: intact                Glasses: no   Edema: no     Sensation: intact   Hand Dominance:  Left     X Right       Left Right Comment   Passive range of motion WFL R hemibody     Active range of motion WFL  R hemibody      Muscle Grade 4/5 0/5     /pinch Strength Intact  Absent       - LUE AROM exercises: 10 reps in all planes of movement to increase ROM/endurance/activity tolerance required for functional transfers/ADL participation. Exercises completed in shoulder and elbow flexion/extension, internal/external rotation, abduction/adduction, supination/pronation, digit and wrist flexion/extension. R UE in sling; Pt states that he does not have anyone complete ROM on R UE any longer. L UE  AROM appears to be [place] when seated EOB. R UE appears flaccid and sling donned. . Fair/fair minus activity tolerance during ex's when seated EOB. Functional Assessment:    Initial Eval Status  Date: 7/13/2020 Treatment Status  Date:7/13/2020 STGs = LTGs  Time frame: 5-7 days   Feeding Supervision  N/T Independent    Grooming Moderate Assist  N/T Independent    UB Dressing Maximal Assist  Max A to tie back of gown; Min A to don shoulder sling Minimal Assist    LB Dressing Dependent  Dep to don/doff shoes and R AFO Minimal Assist    Bathing Maximal Assist  N/T Minimal Assist    Toileting Dependent   MIn A with use of urinal when pt seated in  bed Minimal Assist    Bed Mobility  Supine to sit: Moderate Assist x 2  Scooting: Moderate Assist x 2  Sit to supine:  Moderate Assist x 2   Mod A x 2 for supine to sit; mod A x 2 for scooting; mod A x 2 for sit to supine; assist to guide UB and BLE's, specifically R LE to/from supine Supine to sit: Supervision   Sit to supine: Supervision    Functional Transfers Maximal assist x 2 from EOB to mellisa walker  Mod A x 2 for sit to stand from EOB with use of chux as sling and ayaan stedy x 2 reps; pt pulled up with L UE on stedy; Sit to stand from EOB x 3 reps with use of chux pad with mod A x 2 with use of hemicane; R LE blocked; pt unable to advance feet at this time Minimal Assist    Functional Mobility N/T   N/T Modified [place]    Activity Tolerance fair  fair/fair minus Good         Comments: Patient cleared by nursing staff. Upon arrival pt supine in bed. Pt educated with regards to bed mobility, hand placement, safety awareness, static sitting balance,  standing balance, ROM ex's, toileting/hygiene, LE/UE dressing, ECT's. At end of session pt supine in bed with all lines and tubes intact, call light within reach. Positioning provided. Overall, pt demonstrated decreased independence and safety during completion of ADL/functional transfers/mobility tasks. Pt would benefit from continued skilled OT to increase safety and independence with completion of ADL/IADL tasks for functional independence and quality of life. · Pt has made fair progress towards set goals as noted through :  · Increased standing tolerance during session  · Pt able to hold urinal for toileting and complete hygiene when long seated in bed  · completion of therapeutic exercises to facilitate ROM/endurance required  for completion of ADL/IADL tasks  · through completion of bed mobility, sitting balance/tolerance, standing balance/tolerance.  Jim Hull verbal cuing for safety awareness, joint protection upright position   · Continue with current plan of care    Treatment Time In: 3:25 PM     Treatment Time Out: 4:01PM            Treatment Charges: Mins Units   ADL/Home Mgt     04769 10 1   Thera Activities     11747 20 1   Ther Ex                 24002 6 0   Manual Therapy    [id number]     Neuro Re-ed         14768     Orthotic manage/training                               10088     Non Billable Time     Total Timed Treatment 36 [address]

## 2020-07-13 NOTE — PROGRESS NOTES
Physical Therapy Treatment Note    Room #:  0323/0323-01  Patient Name: Vanita Iverson  YOB: 1950  MRN: [id number]    Referring Provider: Obie Nunez DO    Date of Service: 7/13/2020    Evaluating Physical Therapist: Shea Stone, PT #[id number]      Diagnosis: Closed intertrochanteric fracture of right femur, initial encounter ([facility] Jaylin Crandall S/P Closed reduction with fluoroscopic guidance with long cephalo-medullary locked nail fixation    Patient Active Problem List   Diagnosis    Esophageal cancer ([address]    Oral pharyngeal candidiasis    Emphysematous COPD ([address]    Mucositis due to antineoplastic therapy    Thrush of mouth and esophagus ([address]    Pancytopenia ([address]    Febrile neutropenia ([address]    Acute ischemic cerebrovascular accident (CVA) involving left middle cerebral artery [facility])    Cerebrovascular accident (CVA) due to occlusion of middle cerebral artery ([address]    Dysphagia, oropharyngeal    Severe protein-calorie malnutrition ([address]    Carotid artery stenosis, symptomatic, right    Hypernatremia    Acute hypoxemic respiratory failure ([address]    Painless hematuria    Right hip fracture ([address]      Tentative placement recommendation: Inpatient Rehab    Equipment recommendation: Patient has needed equipment       Prior Level of Function: Patient ambulated with quad cane    Rehab Potential: good for baseline    Past medical history:   Past Medical History:   Diagnosis Date    Cancer ([address] 06/25/2012    throat-underwent chemo and radiaiton    COPD (chronic obstructive pulmonary disease) ([address]     CVA (cerebral vascular accident) ([address]     right sided residual- uses a walker for ambulation     Past Surgical History:   Procedure Laterality Date    DENTAL SURGERY Bilateral 03/26/2018    FEMUR FRACTURE SURGERY Right 7/12/2020    FEMUR OPEN REDUCTION INTERNAL FIXATION performed by Obie Nunez DO at [address] [facility]      right thumb    MOUTH BIOPSY 06/25/2012    right lateral pharynx biopsy. Precautions: falls, alarm and confusion, history CVA R hemiparesis, sling Right upper extremitiy for subluxation, throat cancer, R jaw repair, PWB (partial weight bearing) Right, AFO, tube feed,    SUBJECTIVE:    Social history: Patient lives with spouse in a capecod able to reside first floor  with 1 + 1+3 steps to enter with rail, last 3 only Tub shower     Equipment owned: Bedside commode, Tub transfer bench, Hemiwalker/cane and Quad cane, sling Right upper extremitiy     AM-PAC Basic Mobility        AM-[facility] Mobility Inpatient   How much difficulty turning over in bed?: A Lot  How much difficulty sitting down on / standing up from a chair with arms?: A Lot  How much difficulty moving from lying on back to sitting on side of bed?: A Lot  How much help from another person moving to and from a bed to a chair?: A Lot  How much help from another person needed to walk in hospital room?: Total  How much help from another person for climbing 3-5 steps with a railing?: Total  AM-PAC Inpatient Mobility Raw Score : 10  AM-PAC Inpatient T-Scale Score : 32.29  Mobility Inpatient CMS 0-100% Score: 76.75  Mobility Inpatient CMS G-Code Modifier : CL    Nursing cleared patient for PT treatment. Patient was returning to room on transport cart from CT. OBJECTIVE:   Initial Evaluation  Date: 7/13/2020 Treatment Date:   7/13/2020  Short Term/ Long Term   Goals   Was pt agreeable to Eval/treatment? Yes   Yes To be met in 3 days   Pain level   8/10  Right lower extremity  No number assigned to right lower extremity pain    Bed Mobility  Rolling: Moderate assist of 1    Supine to sit: Moderate assist of  2    Sit to supine: Moderate assist of  2    Scooting: Moderate assist of  2   Rolling: Not assessed    Supine to sit: Moderate assist of  2   Sit to supine:  Moderate assist of  2   Scooting: Maximal assist of 1  Rolling: Minimal assist of 1    Supine to sit: Minimal assist of 1 end of treatment. Patient required maximal assistance to scoot up in bed, right lower extremity and right upper extremity were elevated on pillows, and head of bed was elevated. Ice applied to right upper extremity and right lower extremity. Therapeutic Exercises: Patient performed ankle pumps, long arc quad and seated marching x 10 reps. Verbal cues were given for correct technique and to perform 2-3x daily. Patient unable to perform hip abduction or ankle pumps with right lower extremity. At end of session, patient was in bed with alarm activated, call light and phone within reach, all lines were intact, and nursing was notified. Family was present. ASSESSMENT:  Patient was able to stand using hemicane but was unable to take steps due to weakness and pain. Patient required encouragement throughout. Patient would benefit from continued skilled Physical Therapy to improve functional independence and quality of life. PLAN:    Patient is making good progress towards established goals, will continue with current plan of care.       Time in: 3:25  Time out: 4:01    Total Treatment Time: 36 minutes    CPT codes:  Therapeutic activities (75536)   18 minutes  1 unit(s)  Gait Training (56579) 18 minutes 1 unit(s)    Elizabeth Hancock PTA   LIC# [id number]

## 2020-07-13 NOTE — PROCEDURES
Left Elbow Aspiration     Indication: left elbow bursitis    Procedure: The patient was positioned appropriately and the skin over the aspiration site was prepped with betadine and draped in a sterile fashion. Local anesthesia was not performed . 18 gauge needle was placed into skin and 10cc synovial appearing fluid was aspirated. Fluid sent for analysis. 4x4s, webril, and ace placed over aspiration site. The patient tolerated the procedure well.     Complications: None

## 2020-07-13 NOTE — PROGRESS NOTES
Physical Therapy Initial Evaluation    Room #:  0323/0323-01  Patient Name: Augusto Calzada  YOB: 1950  MRN: [id number]    Referring Provider: Zachary Sales DO    Date of Service: 7/13/2020    Evaluating Physical Therapist:  Tom Rojas, PT #[id number]      Diagnosis:   Closed intertrochanteric fracture of right femur, initial encounter ([facility] Kobi Brown   S/P Closed reduction with fluoroscopic guidance with long cephalo-medullary locked nail fixation    Patient Active Problem List   Diagnosis    Esophageal cancer ([address]    Oral pharyngeal candidiasis    Emphysematous COPD ([address]    Mucositis due to antineoplastic therapy    Thrush of mouth and esophagus ([address]    Pancytopenia ([address]    Febrile neutropenia ([address]    Acute ischemic cerebrovascular accident (CVA) involving left middle cerebral artery territory [facility])    Cerebrovascular accident (CVA) due to occlusion of middle cerebral artery ([address]    Dysphagia, oropharyngeal    Severe protein-calorie malnutrition ([address]    Carotid artery stenosis, symptomatic, right    Hypernatremia    Acute hypoxemic respiratory failure ([address]    Painless hematuria    Right hip fracture ([address]        Tentative placement recommendation: Inpatient Rehab    Equipment recommendation: Patient has needed equipment       Prior Level of Function: Patient ambulated with quad cane    Rehab Potential: good  for baseline    Past medical history:   Past Medical History:   Diagnosis Date    Cancer ([address] 06/25/2012    throat-underwent chemo and radiaiton    COPD (chronic obstructive pulmonary disease) ([address]     CVA (cerebral vascular accident) ([address]     right sided residual- uses a walker for ambulation     Past Surgical History:   Procedure Laterality Date    DENTAL SURGERY Bilateral 03/26/2018    FRACTURE SURGERY      right thumb    MOUTH BIOPSY  06/25/2012    right lateral pharynx biopsy.        Precautions: falls, alarm and confusion , history CVA R hemiparesis feet using  mellisa cane/walker with Moderate assist of 1    Stair negotiation: ascended and descended   Not assessed       5 steps 1 rail moderate    ROM Within functional limits except right hemibody due to history CVA and right hip pain    Increase range of motion 10% of affected joints    Strength BUE: refer to OT eval/5  RLE:  0/5  LLE:  3+/5   Increase strength in affected mm groups by 1/3 grade   Balance Sitting EOB:  fair off loads right hip  Dynamic Standing:  poor hemiwalker  Sitting EOB:  good -  Dynamic Standing: fair hemicane/walker      Patient is Alert & Oriented x person, place and time and follows one step directions    Sensation:  Patient  reports numbness and tingling to extremities    Edema:  yes right lower extremity    Endurance: fair          Patient education  Patient educated on role of Physical Therapy, risks of immobility, safety and plan of care and weight bearing status       Patient response to education:   Pt verbalized understanding Pt demonstrated skill Pt requires further education in this area   Yes Partial Yes      ASSESSMENT: Patient exhibits decreased strength, balance, coordination impairing functional mobility. Therapist educated and facilitated patient on techniques to increase safety and independence with bed mobility, balance, functional transfers, and functional mobility. Treatment:  Patient practiced and was instructed in the following treatment: performed exercises. Instruction in partial weight bearing Right lower extremity. Donned Right lower extremity elisabeth hose and AFO and bilateral shoes. Sat edge of bed 20 minutes with Minimal assist of 1 to increase dynamic sitting balance and activity tolerance. donned sling Right upper extremitiy due to shoulder subluxation. Stood x 2, nursing present for skin inspection. Therapeutic Exercises:   quad sets, glut sets x 15-20 each.  Active assist ankle pumps,  heelslide, hip abduction/adduction, straight leg raise x 10-15 each       At end of session, patient in bed in chair position with spouse preent call light and phone within reach,   all lines and tubes intact, nursing notified. Patient would benefit from continued skilled Physical Therapy to improve functional independence and quality of life. Patient's/ family goals   [place] if needed      Patient and or family understand(s) diagnosis, prognosis, and plan of care. PLAN:    Physical Therapy care will be provided in accordance with the objectives noted above. Exercises and functional mobility practice will be used as well as appropriate assistive devices or modalities to obtain goals. Patient and family education will also be administered as needed. Frequency of treatments: Patient will be seen  twice daily  for therapeutic exercise, functional retraining, endurance activities, balance exercises, family and patient education. Time in  845  Time out  934    Total Treatment Time  38 minutes    Evaluation time includes thorough review of current medical information, gathering information on past medical history/social history and prior level of function, completion of standardized testing/informal observation of tasks, assessment of data, and development of Plan of care and goals.      CPT codes:  Low Complexity PT evaluation (69148)  Therapeutic activities (79660)   30 minutes  2 unit(s)  Therapeutic exercises (53988)   8 minutes  1 unit(s)    Gabriel Morel PT

## 2020-07-13 NOTE — CARE COORDINATION
SS Note/Dischare plan:  Consult noted for d/c planning, \"request [facility] placement\" however per [facility] admissions liaison pt's insurance will NOT approve Acute rehab admission, sw met with pt's wife, Lino Simmons, pt being taken out of room for testing, explained sw role for transition of care and rehab options, SNF list provided, pt's wife prefers [facility] placement  at [facility] were she works, referral made to [facility] admissions liaison who confirmed bed is available and will submit for PRE CERT TODAY 9/33, nursing informed, sw/cm to follow to confirm transfer arrangements. Electronically signed by HOSSEIN Pelaez on 7/13/2020 at 3:52 PM

## 2020-07-14 PROBLEM — E43 SEVERE PROTEIN-CALORIE MALNUTRITION (HCC): Chronic | Status: ACTIVE | Noted: 2020-07-14

## 2020-07-14 LAB
ANION GAP SERPL CALCULATED.3IONS-SCNC: 7 MMOL/L (ref 7–16)
BASOPHILS ABSOLUTE: 0.06 E9/L (ref 0–0.2)
BASOPHILS RELATIVE PERCENT: 0.9 % (ref 0–2)
BLOOD BANK DISPENSE STATUS: NORMAL
BLOOD BANK PRODUCT CODE: NORMAL
BPU ID: NORMAL
BUN BLDV-MCNC: 20 MG/DL (ref 8–23)
CALCIUM SERPL-MCNC: 8.3 MG/DL (ref 8.6–10.2)
CHLORIDE BLD-SCNC: 102 MMOL/L (ref 98–107)
CO2: 28 MMOL/L (ref 22–29)
CREAT SERPL-MCNC: 0.5 MG/DL (ref 0.7–1.2)
CRYSTALS, FLUID: NORMAL
DESCRIPTION BLOOD BANK: NORMAL
EOSINOPHILS ABSOLUTE: 0.48 E9/L (ref 0.05–0.5)
EOSINOPHILS RELATIVE PERCENT: 7 % (ref 0–6)
GFR AFRICAN AMERICAN: >60
GFR NON-AFRICAN AMERICAN: >60 ML/MIN/1.73
GLUCOSE BLD-MCNC: 142 MG/DL (ref 74–99)
GRAM STAIN ORDERABLE: NORMAL
HCT VFR BLD CALC: 22.5 % (ref 37–54)
HCT VFR BLD CALC: 26.3 % (ref 37–54)
HEMOGLOBIN: 7.1 G/DL (ref 12.5–16.5)
HEMOGLOBIN: 8.4 G/DL (ref 12.5–16.5)
LYMPHOCYTES ABSOLUTE: 1.1 E9/L (ref 1.5–4)
LYMPHOCYTES RELATIVE PERCENT: 15.7 % (ref 20–42)
MCH RBC QN AUTO: 33.6 PG (ref 26–35)
MCHC RBC AUTO-ENTMCNC: 31.6 % (ref 32–34.5)
MCV RBC AUTO: 106.6 FL (ref 80–99.9)
MONOCYTES ABSOLUTE: 0.28 E9/L (ref 0.1–0.95)
MONOCYTES RELATIVE PERCENT: 4.3 % (ref 2–12)
NEUTROPHILS ABSOLUTE: 4.97 E9/L (ref 1.8–7.3)
NEUTROPHILS RELATIVE PERCENT: 72.2 % (ref 43–80)
OVALOCYTES: ABNORMAL
PDW BLD-RTO: 13.8 FL (ref 11.5–15)
PLATELET # BLD: 123 E9/L (ref 130–450)
PMV BLD AUTO: 13 FL (ref 7–12)
POIKILOCYTES: ABNORMAL
POTASSIUM SERPL-SCNC: 4 MMOL/L (ref 3.5–5)
RBC # BLD: 2.11 E12/L (ref 3.8–5.8)
SODIUM BLD-SCNC: 137 MMOL/L (ref 132–146)
WBC # BLD: 6.9 E9/L (ref 4.5–11.5)

## 2020-07-14 PROCEDURE — 85014 HEMATOCRIT: CPT

## 2020-07-14 PROCEDURE — 85018 HEMOGLOBIN: CPT

## 2020-07-14 PROCEDURE — 80048 BASIC METABOLIC PNL TOTAL CA: CPT

## 2020-07-14 PROCEDURE — 2580000003 HC RX 258: Performed by: ORTHOPAEDIC SURGERY

## 2020-07-14 PROCEDURE — 85025 COMPLETE CBC W/AUTO DIFF WBC: CPT

## 2020-07-14 PROCEDURE — 6370000000 HC RX 637 (ALT 250 FOR IP): Performed by: ORTHOPAEDIC SURGERY

## 2020-07-14 PROCEDURE — P9016 RBC LEUKOCYTES REDUCED: HCPCS

## 2020-07-14 PROCEDURE — 96372 THER/PROPH/DIAG INJ SC/IM: CPT

## 2020-07-14 PROCEDURE — 97530 THERAPEUTIC ACTIVITIES: CPT

## 2020-07-14 PROCEDURE — 36430 TRANSFUSION BLD/BLD COMPNT: CPT

## 2020-07-14 PROCEDURE — 2580000003 HC RX 258: Performed by: INTERNAL MEDICINE

## 2020-07-14 PROCEDURE — 6360000002 HC RX W HCPCS: Performed by: ORTHOPAEDIC SURGERY

## 2020-07-14 PROCEDURE — 1200000000 HC SEMI PRIVATE

## 2020-07-14 PROCEDURE — 36415 COLL VENOUS BLD VENIPUNCTURE: CPT

## 2020-07-14 RX ORDER — 0.9 % SODIUM CHLORIDE 0.9 %
20 INTRAVENOUS SOLUTION INTRAVENOUS ONCE
Status: COMPLETED | OUTPATIENT
Start: 2020-07-14 | End: 2020-07-14

## 2020-07-14 RX ADMIN — MAGNESIUM HYDROXIDE 30 ML: 400 SUSPENSION ORAL at 16:04

## 2020-07-14 RX ADMIN — SODIUM CHLORIDE 20 ML: 9 INJECTION, SOLUTION INTRAVENOUS at 11:00

## 2020-07-14 RX ADMIN — OXYCODONE HYDROCHLORIDE 10 MG: 5 TABLET ORAL at 10:55

## 2020-07-14 RX ADMIN — DESMOPRESSIN ACETATE 40 MG: 0.2 TABLET ORAL at 19:56

## 2020-07-14 RX ADMIN — ASPIRIN 81 MG 81 MG: 81 TABLET ORAL at 19:56

## 2020-07-14 RX ADMIN — OXYCODONE HYDROCHLORIDE 10 MG: 5 TABLET ORAL at 15:14

## 2020-07-14 RX ADMIN — KETOROLAC TROMETHAMINE 15 MG: 15 INJECTION, SOLUTION INTRAMUSCULAR; INTRAVENOUS at 05:35

## 2020-07-14 RX ADMIN — HEPARIN SODIUM 5000 UNITS: 5000 INJECTION, SOLUTION INTRAVENOUS; SUBCUTANEOUS at 01:34

## 2020-07-14 RX ADMIN — DOCUSATE SODIUM AND SENNOSIDES 1 TABLET: 8.6; 5 TABLET, FILM COATED ORAL at 07:47

## 2020-07-14 RX ADMIN — DOCUSATE SODIUM AND SENNOSIDES 1 TABLET: 8.6; 5 TABLET, FILM COATED ORAL at 19:57

## 2020-07-14 RX ADMIN — ACETAMINOPHEN 650 MG: 325 TABLET, FILM COATED ORAL at 05:39

## 2020-07-14 RX ADMIN — Medication 10 ML: at 21:04

## 2020-07-14 RX ADMIN — CLOPIDOGREL 75 MG: 75 TABLET, FILM COATED ORAL at 19:56

## 2020-07-14 RX ADMIN — OXYCODONE HYDROCHLORIDE 10 MG: 5 TABLET ORAL at 23:25

## 2020-07-14 RX ADMIN — Medication 10 ML: at 07:47

## 2020-07-14 RX ADMIN — OXYCODONE HYDROCHLORIDE 10 MG: 5 TABLET ORAL at 19:24

## 2020-07-14 RX ADMIN — HEPARIN SODIUM 5000 UNITS: 5000 INJECTION, SOLUTION INTRAVENOUS; SUBCUTANEOUS at 14:42

## 2020-07-14 RX ADMIN — OXYCODONE HYDROCHLORIDE 10 MG: 5 TABLET ORAL at 05:35

## 2020-07-14 ASSESSMENT — PAIN SCALES - GENERAL
PAINLEVEL_OUTOF10: 5
PAINLEVEL_OUTOF10: 0
PAINLEVEL_OUTOF10: 9
PAINLEVEL_OUTOF10: 1
PAINLEVEL_OUTOF10: 8
PAINLEVEL_OUTOF10: 7
PAINLEVEL_OUTOF10: 8
PAINLEVEL_OUTOF10: 2
PAINLEVEL_OUTOF10: 8

## 2020-07-14 ASSESSMENT — PAIN DESCRIPTION - PROGRESSION: CLINICAL_PROGRESSION: NOT CHANGED

## 2020-07-14 NOTE — PROGRESS NOTES
OT BEDSIDE TREATMENT NOTE      Date:2020  Patient Name: Claude Kaiser  MRN: [id number]  : 1950  Room: [address] [place] [address]     Referring Provider: Lashay Bhakta DO   Evaluating OT: Peggy Gaona OTR/L [id number]     Placement Recommendation: Inpatient Rehab   Recommended Adaptive Equipment: none      [facility]   AM-PAC Daily Activity Inpatient   How much help for putting on and taking off regular lower body clothing?: Total  How much help for Bathing?: A Lot  How much help for Toileting?: Total  How much help for putting on and taking off regular upper body clothing?: A Lot  How much help for taking care of personal grooming?: A Lot  How much help for eating meals?: A Little  AM-PAC Inpatient Daily Activity Raw Score: 11  AM-PAC Inpatient ADL T-Scale Score : 29.04  ADL Inpatient CMS 0-100% Score: 70.42  ADL Inpatient CMS G-Code Modifier : CL     Diagnosis:   1. Closed 2-part intertrochanteric fracture of right femur, initial encounter ([facility]    2. Fracture, hip, right, closed, initial encounter [facility])      Pertinent Medical History:   Past Medical History        Past Medical History:   Diagnosis Date    [facility] 2012     throat-underwent chemo and radiaiton    COPD (chronic obstructive pulmonary disease) ([facility]      CVA (cerebral vascular accident) ([facility]       right sided residual- uses a walker for ambulation         Precautions:  falls, ORIF: R hip, Partial Weight Bearing: R LE, hx of CVA with R hemibody  Pain Scale: Numeric Rate: unquantified pain in R hip; Nursing aware. ; wife present during session     Social history: with family: spouse     Home architecture: single family home, [place] [facility], 1+1+3 steps to enter with rail to the last 3 steps, tub shower. PLOF: needs assistance with BADL and IADL   Equipment owned: quad cane, bedside commode, tub transfer bench, R UE sling d/t subluxation   Cognition: oriented x 3; follows 2 step directions.                fair  Problem solving skills fair  Memory               fair  Sequencing  Communication: intact   Visual perceptual skills: intact                Glasses: no   Edema: no     Sensation: intact   Hand Dominance:  Left     X Right       Left Right Comment   Passive range of motion WFL R hemibody     Active range of motion WFL  R hemibody      Muscle Grade 4/5 0/5     /pinch Strength Intact  Absent        Functional Assessment:    Initial Eval Status  Date: 7/13/2020 Treatment Status  Date:7/14/2020 STGs = LTGs  Time frame: 5-7 days   Feeding Supervision  Mod A to unscrew lid on water bottle; pt able to hold onto water bottle and bring to mouth with L hand Independent    Grooming Moderate Assist  N/T Independent    UB Dressing Maximal Assist  Max A to tie back of gown; Min A to don shoulder sling Minimal Assist    LB Dressing Dependent  Dep to doff shoes and R AFO; wife donned shoes and AFO Minimal Assist    Bathing Maximal Assist  N/T Minimal Assist    Toileting Dependent   N/T Minimal Assist    Bed Mobility  Supine to sit: Moderate Assist x 2  Scooting: Moderate Assist x 2  Sit to supine: Moderate Assist x 2   Mod A x 2 for supine to sit; mod A x 2 for scooting; mod A x 2/max A x 2 for sit to supine; assist to guide UB and BLE's to/from supine Supine to sit: Supervision   Sit to supine: Supervision    Functional Transfers Maximal assist x 2 from EOB to mellisa walker  Max A x 2 for sit to stand from EOB with  use of hemicane x 2 reps and chux pad as sling; Sit to stand from EOB x 3 reps with use of hemiwalker and max A x 2; R LE blocked; pt unable to advance feet at this time; pt demo'd decreased standing balance/tolerance and B knee flexion; cuing and assist for upright position; pt had received blood earlier on this date. Minimal Assist    Functional Mobility N/T   N/T Modified [place]    Activity Tolerance fair  fair/fair minus Good         Comments: Patient cleared by nursing staff. Upon arrival pt supine in bed.    Pt educated with regards to bed mobility, hand placement, safety awareness, static sitting balance,  standing balance, LE/UE dressing, ECT's. At end of session pt supine in bed with all lines and tubes intact, call light within reach. Positioning provided. Overall, pt demonstrated decreased independence and safety during completion of ADL/functional transfers/mobility tasks. Pt would benefit from continued skilled OT to increase safety and independence with completion of ADL/IADL tasks for functional independence and quality of life. · Pt has made fair progress towards set goals as noted through :  · Pt willing to participate in therapy. · Pt fatigued on this date and demo'd difficulty with upright posture d/t fatigue, pain and pt receiving blood prior to session. · Pt able to assist in donning R UE sling  · through completion of bed mobility, sitting balance/tolerance, standing balance/tolerance.  Brenda Gaucher verbal cuing for safety awareness, joint protection upright position   · Continue with current plan of care    Treatment Time In: 2:00 PM     Treatment Time Out: 2:32 PM            Treatment Charges: Mins Units   ADL/Home Mgt     38930     Thera Activities     [id number] 32 2   Ther Ex                 65764     Manual Therapy    [id number]     Neuro Re-ed         75835     Orthotic manage/training                               56549     Non Billable Time     Total Timed Treatment 32 [address] [facility], [address] [place] [address]

## 2020-07-14 NOTE — PROGRESS NOTES
Room #:  2999/0986-00    Date: 2020       Patient Name: Chris Henson  : 1950      MRN: [id number]     Patient unavailable for physical therapy treatment due to pt receiving blood.       Mello Khan [facility]  LIC# [id number]

## 2020-07-14 NOTE — DISCHARGE INSTR - COC
Continuity of Care Form    Patient Name: Nancy Mata   :  1950  MRN:  [id number]    Admit date:  2020  Discharge date:  7/15/2020    Code Status Order: Full Code   Advance Directives:   Advance Care Flowsheet Documentation     Date/Time Healthcare Directive Type of Healthcare Directive Copy in [address] Agent's Name Healthcare Agent's Phone Number    20 [phone]  Yes, patient has an advance directive for healthcare treatment  Durable power of  for health care;Living will  No, copy requested from family  Spouse  Glenn Mike  --    20 1544  No, patient does not have an advance directive for healthcare treatment  --  --  --  --  --          Admitting Physician:  Anna Holland DO  PCP: Tracy Pineda DO    Discharging Nurse: [facility] Unit/Room#: 0073/9047-27  Discharging Unit Phone Number: [phone]    Emergency Contact:   Extended Emergency Contact Information  Primary Emergency Contact: Isabelle Benson  Address: [address] Phone: [phone]  Relation: Spouse  Secondary Emergency Contact: More Hart  Address: [address] [place] [address] Phone: [phone]  Relation: Child    Past Surgical History:  Past Surgical History:   Procedure Laterality Date    DENTAL SURGERY Bilateral 2018    FEMUR FRACTURE SURGERY Right 2020    FEMUR OPEN REDUCTION INTERNAL FIXATION performed by Mesha Odom DO at [address] [facility]      right thumb    MOUTH BIOPSY  2012    right lateral pharynx biopsy. Immunization History: There is no immunization history on file for this patient.     Active Problems:  Patient Active Problem List   Diagnosis Code    Esophageal cancer ([address] C15.9    Oral pharyngeal candidiasis B37.0    Emphysematous COPD ([address] J43.9    Mucositis due to antineoplastic therapy K12.31    Thrush of mouth and esophagus (HCC) B37.81, B37.0    Pancytopenia (HCC) D61.818    Febrile neutropenia (HCC) D70.9, R50.81    Acute ischemic cerebrovascular accident (CVA) involving left middle cerebral artery territory [facility]) K07.678    Cerebrovascular accident (CVA) due to occlusion of middle cerebral artery (HCC) I63.519    Dysphagia, oropharyngeal R13.12    Severe protein-calorie malnutrition ([facility] E43    Carotid artery stenosis, symptomatic, right I65.21    Hypernatremia E87.0    Acute hypoxemic respiratory failure (HCC) J96.01    Painless hematuria R31.9    Right hip fracture ([facility]) S72.141A       Isolation/Infection:   Isolation          No Isolation        Patient Infection Status     Infection Onset Added Last Indicated Last Indicated By Review Planned Expiration Resolved Resolved By    None active    Resolved    COVID-19 Rule Out 07/11/20 07/11/20 07/11/20 COVID-19 (Ordered)   07/11/20 Rule-Out Test Resulted          Nurse Assessment:  Last Vital Signs: BP (!) 102/53   Pulse 102   Temp 99.2 °F (37.3 °C) (Oral)   Resp 18   Ht 6' (1.829 m)   Wt 157 lb (71.2 kg)   SpO2 91%   BMI 21.29 kg/m²     Last documented pain score (0-10 scale): Pain Level: 1  Last Weight:   Wt Readings from Last 1 Encounters:   07/11/20 157 lb (71.2 kg)     Mental Status:  disoriented and aphasic    IV Access:  - None    Nursing Mobility/ADLs:  Walking   Assisted  Transfer  Assisted  Bathing  Assisted  Dressing  Assisted  Toileting  Assisted  Feeding  Assisted  Med Admin  Assisted  Med Delivery   crushed    Wound Care Documentation and Therapy:        Elimination:  Continence:   · Bowel: {YES / AO:14933}  · Bladder: {YES / JT:15466}  Urinary Catheter: None   Colostomy/Ileostomy/Ileal Conduit: No       Date of Last BM:     Intake/Output Summary (Last 24 hours) at 7/14/2020 0855  Last data filed at 7/14/2020 0454  Gross per 24 hour   Intake 2065 ml   Output 730 ml   Net 1335 ml     I/O last 3 completed shifts: In: 2065 [P.O.:30; NG/GT:2035]  Out: 730 [Urine:730]    Safety Concerns:     History of Falls (last 30 days) and At Risk for Falls    Impairments/Disabilities:      Speech    Nutrition Therapy:  Current Nutrition Therapy:   - Tube Feedings:  Standard with fiber    Routes of Feeding: Oral  Liquids: No Liquids  Daily Fluid Restriction: no  Last Modified Barium Swallow with Video (Video Swallowing Test): not done    Treatments at the Time of Hospital Discharge:   Respiratory Treatments: ***  Oxygen Therapy:  is not on home oxygen therapy. Ventilator:    - No ventilator support    Rehab Therapies: Physical Therapy and Occupational Therapy  Weight Bearing Status/Restrictions: No weight bearing restirctions  Other Medical Equipment (for information only, NOT a DME order):  walker  Other Treatments: ***    Patient's personal belongings (please select all that are sent with patient):  Dentures upper    RN SIGNATURE:  Electronically signed by Mora Silverman RN on 7/15/20 at 11:07 AM EDT    CASE MANAGEMENT/SOCIAL WORK SECTION    Inpatient Status Date: 7/11/2020    Readmission Risk Assessment Score:  Readmission Risk              Risk of Unplanned Readmission:        13           Discharging to Facility/ Agency   · Name: [facility]  · Address:  · Phone: [phone]  · Fax: [phone]    Dialysis Facility (if applicable)   · Name:  · Address:  · Dialysis Schedule:  · Phone:  · Fax:    / signature: Electronically signed by HOSSEIN Acevedo on 7/14/20 at 8:55 AM EDT    PHYSICIAN SECTION    Prognosis: Good    Condition at Discharge: Stable    Rehab Potential (if transferring to Rehab): Good    Recommended Labs or Other Treatments After Discharge: ***    Physician Certification: I certify the above information and transfer of Brad Vieyra  is necessary for the continuing treatment of the diagnosis listed and that he requires Manuel Chad for less 30 days.      Update Admission H&P: {CHP DME Changes in [facility]}    PHYSICIAN SIGNATURE:  Electronically signed by Kay Jewell DO on 7/15/20 at 7:57 AM EDT

## 2020-07-14 NOTE — PROGRESS NOTES
Physical Therapy Treatment Note    Room #:  0323/0323-01  Patient Name: Gerber Singletoning  YOB: 1950  MRN: [id number]    Referring Provider: Ashley Ball DO    Date of Service: 7/14/2020    Evaluating Physical Therapist: Josefina Li, PT #[id number]      Diagnosis: Closed intertrochanteric fracture of right femur, initial encounter ([facility] [address] Wayne Blunt S/P Closed reduction with fluoroscopic guidance with long cephalo-medullary locked nail fixation    Patient Active Problem List   Diagnosis    Esophageal cancer ([address]    Oral pharyngeal candidiasis    Emphysematous COPD ([address]    Mucositis due to antineoplastic therapy    Thrush of mouth and esophagus ([address]    Pancytopenia ([address]    Febrile neutropenia ([address]    Acute ischemic cerebrovascular accident (CVA) involving left middle cerebral artery [facility])    Cerebrovascular accident (CVA) due to occlusion of middle cerebral artery ([address]    Dysphagia, oropharyngeal    Severe protein-calorie malnutrition ([address]    Carotid artery stenosis, symptomatic, right    Hypernatremia    Acute hypoxemic respiratory failure ([address]    Painless hematuria    Right hip fracture ([address]      Tentative placement recommendation: Inpatient Rehab    Equipment recommendation: Patient has needed equipment       Prior Level of Function: Patient ambulated with quad cane    Rehab Potential: good for baseline    Past medical history:   Past Medical History:   Diagnosis Date    Cancer ([address] 06/25/2012    throat-underwent chemo and radiaiton    COPD (chronic obstructive pulmonary disease) ([address]     CVA (cerebral vascular accident) ([address]     right sided residual- uses a walker for ambulation     Past Surgical History:   Procedure Laterality Date    DENTAL SURGERY Bilateral 03/26/2018    FEMUR FRACTURE SURGERY Right 7/12/2020    FEMUR OPEN REDUCTION INTERNAL FIXATION performed by Ashley Ball DO at [facility]      right thumb    MOUTH BIOPSY 06/25/2012    right lateral pharynx biopsy. Precautions: falls, alarm and confusion, history CVA R hemiparesis, sling Right upper extremitiy for subluxation, throat cancer, R jaw repair, PWB (partial weight bearing) Right, AFO, tube feed,    SUBJECTIVE:    Social history: Patient lives with spouse in a capecod able to reside first floor  with 1 + 1+3 steps to enter with rail, last 3 only Tub shower     Equipment owned: Bedside commode, Tub transfer bench, Hemiwalker/cane and Quad cane, sling Right upper extremitiy     AM-PAC Basic Mobility        AM-[facility] Mobility Inpatient   How much difficulty turning over in bed?: A Lot  How much difficulty sitting down on / standing up from a chair with arms?: A Lot  How much difficulty moving from lying on back to sitting on side of bed?: A Lot  How much help from another person moving to and from a bed to a chair?: A Lot  How much help from another person needed to walk in hospital room?: A Lot  How much help from another person for climbing 3-5 steps with a railing?: Total  -PAC Inpatient Mobility Raw Score : 11  AM-PAC Inpatient T-Scale Score : 33.86  Mobility Inpatient CMS 0-100% Score: 72.57  Mobility Inpatient CMS G-Code Modifier : CL    Nursing cleared patient for PT treatment. OBJECTIVE:   Initial Evaluation  Date: 7/13/2020 Treatment Date:   7/14/2020  Short Term/ Long Term   Goals   Was pt agreeable to Eval/treatment? Yes   Yes To be met in 3 days   Pain level   8/10  Right lower extremity  No number assigned to right lower extremity pain    Bed Mobility  Rolling: Moderate assist of 1    Supine to sit: Moderate assist of  2    Sit to supine: Moderate assist of  2    Scooting: Moderate assist of  2   Rolling: Moderate assist of 1   Supine to sit: Moderate assist of  2   Sit to supine:  Moderate assist of  2   Scooting: Maximal assist of 1  Rolling: Minimal assist of 1    Supine to sit: Minimal assist of 1    Sit to supine: Minimal assist of 1    Scooting: Minimal assist of 1     Transfers Sit to stand: Maximal assist of  2 chux as sling Sit to stand: Maximal assist of  2 x 5reps using  hemicane and chux for support  Sit to stand: Moderate assist of 1 hemicane   Ambulation    not assessed patient unable to advance lower extremities  Not assessed due to unable to advance bilateral lower extremities  10 feet using  mellisa cane/walker with Moderate assist of 1    Stair negotiation: ascended and descended   Not assessed    Not assessed   5 steps 1 rail moderate    ROM Within functional limits except right hemibody due to history CVA and right hip pain    Increase range of motion 10% of affected joints    Strength BUE: refer to OT eval/5  RLE:  0/5  LLE:  3+/5   Increase strength in affected mm groups by 1/3 grade   Balance Sitting EOB:  fair off loads right hip  Dynamic Standing:  poor hemiwalker Sitting EOB: fair   Dynamic Standing: poor due to right side weakness Sitting EOB:  good -  Dynamic Standing: fair hemicane/walker      Patient is Alert & Oriented x person, place, time and situation and follows directions    Patient education  Patient was educated and facilitated on techniques to increase safety and independence with bed mobility, balance, functional transfers, and functional mobility. Patient was explained the the benefits of mobility and risks of immobility. Patient response to education:   Pt verbalized understanding Pt demonstrated skill Pt requires further education in this area    Yes Partial Yes      Treatment: Patient practiced and was instructed in the following treatment:      Patient transferred to side of bed and sat up x 20 minutes to increase dynamic sitting balance and activity tolerance. Patient stood x 5 reps using hemicane. . Patient was returned to bed at end of treatment. Patient required maximal assistance to scoot up in bed, right lower extremity and right upper extremity were elevated on pillows, and head of bed was elevated.  Ice applied to right upper extremity and right lower extremity. Therapeutic Exercises: Patient performed  x 10 reps. Verbal cues were given for correct technique and to perform 2-3x daily. Patient unable to perform hip abduction or ankle pumps with right lower extremity. At end of session, patient was in bed with alarm activated, call light and phone within reach, all lines were intact, and nursing was notified. Family was present. ASSESSMENT:  Patient was able to stand using hemicane but was unable to take steps due to weakness and pain. Patient required encouragement throughout. Pt's wife present and supportive and able to direct care and encourage patient     Patient would benefit from continued skilled Physical Therapy to improve functional independence and quality of life. PLAN:    Patient is making good progress towards established goals, will continue with current plan of care.       Time in: 200  Time out: 232    Total Treatment Time:  32minutes    CPT codes:  Therapeutic activities (76958)   32 minutes  2 unit(s)    Jj Allen PTA  LIC# [id number]

## 2020-07-14 NOTE — PROGRESS NOTES
Internal Medicine Progress Note     [facility]     Rony Hobbs, F.A.ANKUSHOKendalIKendal Campbell D.O., PRIYANKODINAH Moses D.O. Domo Monge, MSN, APRN, NP-C  Lazarus Leep. Katherine Fothergill, MSN, APRN-CNP     Primary Care Physician: Jeramy Alicea DO   Admitting Physician:  Claudia Looney DO  Admission date and time: 7/11/2020 11:45 AM    Room:  0323/0323-01    Patient Name: Claude Kaiser  MRN: [id number]    Date of Service: 7/14/2020     Subjective:  Severiano Bingham seems relatively comfortable during my examination today. We discussed the results of the CT scan of the chest which indicates no mass. He has had a mild downward trend in his hemoglobin following the reinstitution of aspirin and Plavix. We discussed packed red blood cell transfusion. We also discussed discharge planning to a skilled nursing facility. His wife was not present during the examination today but will be updated accordingly. Review of System: HEENT:  [place] ear pain, sore throat, sinus or eye problems  Cardiovascular:   Denies any chest pain, irregular heartbeats, or palpitations. Respiratory:   Denies shortness of breath, coughing, sputum production, hemoptysis, or wheezing. Gastrointestinal:   Denies nausea, vomiting, diarrhea, or constipation. Denies any abdominal pain. Extremities:   Postoperative hip pain as to be expected. Neurology:    Chronic deficits related to previous CVA. Derm:    Denies any rashes, ulcers, or excoriations. Denies bruising. Genitourinary:    Denies any urgency, frequency, hematuria. Voiding without difficulty. Musculoskeletal:  Denies myalgias, joint complaints or back pain      Physical Exam:  No intake/output data recorded. Blood pressure (!) 102/53, pulse 102, temperature 99.2 °F (37.3 °C), temperature source Oral, resp. rate 18, height 6' (1.829 m), weight 157 lb (71.2 kg), SpO2 91 %. HEENT:    PERRLA. EOMI. Sclera clear.   Buccal mucosa moist.  Neck:    Supple. Trachea midline. No thyromegaly. No JVD. No bruits. Heart:    Rhythm regular, rate controlled. No murmurs. Lungs:    Symmetrical. Clear to auscultation bilaterally. No wheezes. No rhonchi. No rales. Abdomen:   Soft. Non-tender. Non-distended. Bowel sounds positive. No organomegaly or masses. No pain on palpation  Extremities:    Peripheral pulses present. No peripheral edema. No ulcers. Neurologic:    Alert x3. Right upper extremity paresis is identified. Right lower extremity weakness is identified. Skin:    No petechia. No hemorrhage. Postoperative dressings were in place. Psych:   Behavior is normal. Mood appears normal. Speech is not rapid or pressured. Musculokeletal:  Spine ROM normal. Muscular strength intact. Gait not assessed.   Genitalia/Breast:  Deferred    Scheduled Meds:   sodium chloride  20 mL Intravenous Once    clopidogrel  75 mg Per G Tube Nightly    aspirin  81 mg Per G Tube Nightly    heparin (porcine)  5,000 Units Subcutaneous Q12H    sodium chloride flush  10 mL Intravenous 2 times per day    sennosides-docusate sodium  1 tablet Oral BID    atorvastatin  40 mg Per G Tube Nightly       Objective Data:  CBC with Differential:    Lab Results   Component Value Date    WBC 6.9 07/14/2020    RBC 2.11 07/14/2020    HGB 7.1 07/14/2020    HCT 22.5 07/14/2020     07/14/2020    .6 07/14/2020    MCH 33.6 07/14/2020    MCHC 31.6 07/14/2020    RDW 13.8 07/14/2020    SEGSPCT 71 01/16/2014    METASPCT 0.9 07/13/2020    LYMPHOPCT 15.7 07/14/2020    MONOPCT 4.3 07/14/2020    MYELOPCT 2 09/27/2012    BASOPCT 0.9 07/14/2020    MONOSABS 0.28 07/14/2020    LYMPHSABS 1.10 07/14/2020    EOSABS 0.48 07/14/2020    BASOSABS 0.06 07/14/2020     BMP:    Lab Results   Component Value Date     07/14/2020    K 4.0 07/14/2020    K 4.0 07/01/2019     07/14/2020    CO2 28 07/14/2020    BUN 20 07/14/2020    LABALBU 3.5 07/12/2020    CREATININE 0.5 07/14/2020    CALCIUM 8.3 07/14/2020    GFRAA >60 07/14/2020    LABGLOM >60 07/14/2020    GLUCOSE 142 07/14/2020       Assessment:   1. Mechanical fall resulting in a displaced intratrochanteric fracture of the right hip status post orthopedic repair  2. Postoperative blood loss anemia  3. CVA with residual right-sided deficits  4. Dysphagia status post PEG tube placement  5. Non-oxygen dependent COPD with chronic respiratory failure  6. Hyperlipidemia    Plan:   Following the institution of aspirin and Prilosec yesterday, hemoglobin did trend downward mildly. We will transfuse 1 unit of packed red blood cells and reassess hemoglobin. We did move forward with CT scan of the chest yesterday in lieu of CXR findings of a masslike appearance. CT scan did not identify any acute abnormality. The patient was updated extensively. The patient's insurance does not cover acute rehabilitation at [facility] and discharge planning is underway for transfer to a skilled nursing facility. I anticipate discharging the patient to the skilled nursing facility tomorrow. He will work extensively with the therapy teams today. More than 50% of my time was spent at the bedside counseling/coordinating care with the patient and/or family with face to face contact. This time was spent reviewing notes and laboratory data as well as instructing and counseling the patient. Time I spent with the family or surrogate(s) is included only if the patient was incapable of providing the necessary information or participating in medical decisions. I also discussed the differential diagnosis and all of the proposed management plans with the patient and individuals accompanying the patient. I am readily available for any further decision-making and intervention.        Anastasia Urban DO, F.A.C.O.I.  7/14/2020  7:34 AM

## 2020-07-14 NOTE — PLAN OF CARE
Problem: Pain:  Goal: Pain level will decrease  Description: Pain level will decrease  7/13/2020 2058 by Chery Crandall RN  Outcome: Met This Shift     Problem: Pain:  Goal: Control of acute pain  Description: Control of acute pain  7/13/2020 2058 by Chery Crandall RN  Outcome: Met This Shift     Problem: Falls - Risk of:  Goal: Will remain free from falls  Description: Will remain free from falls  7/13/2020 2058 by Chery Crandall RN  Outcome: Met This Shift     Problem: Falls - Risk of:  Goal: Absence of physical injury  Description: Absence of physical injury  Outcome: Met This Shift     Problem: Skin Integrity:  Goal: Will show no infection signs and symptoms  Description: Will show no infection signs and symptoms  7/13/2020 2058 by Chery Crandall RN  Outcome: Met This Shift     Problem: Skin Integrity:  Goal: Absence of new skin breakdown  Description: Absence of new skin breakdown  7/13/2020 2058 by Chery Crandall RN  Outcome: Met This Shift     Problem: HEMODYNAMIC STATUS  Goal: Patient has stable vital signs and fluid balance  7/13/2020 2058 by Chery Crandall RN  Outcome: Met This Shift

## 2020-07-14 NOTE — PLAN OF CARE
Problem: Pain:  Goal: Pain level will decrease  Description: Pain level will decrease  Outcome: Met This Shift  Goal: Control of acute pain  Description: Control of acute pain  Outcome: Met This Shift  Goal: Control of chronic pain  Description: Control of chronic pain  Outcome: Met This Shift     Problem: Falls - Risk of:  Goal: Will remain free from falls  Description: Will remain free from falls  Outcome: Met This Shift  Goal: Absence of physical injury  Description: Absence of physical injury  Outcome: Met This Shift     Problem: HEMODYNAMIC STATUS  Goal: Patient has stable vital signs and fluid balance  Outcome: Met This Shift

## 2020-07-14 NOTE — CARE COORDINATION
SS Note/Discharge plan:  COVID NEGATIVE ( 7/11) Consult noted to d/c to SNF 7/15, Ondina Tirado liaison for [employer], PRE CERT SUBMITTED 1/69, RADHA generated in [address] [place] [address] exemption completed, pt/wife and nursing notified. Electronically signed by HOSSEIN Hodgson on 7/14/2020 at 8:50 AM

## 2020-07-14 NOTE — PROGRESS NOTES
Department of Orthopedic Surgery  Resident Progress Note    Patient seen and examined this morning. Able to transfer yesterday with PT. Pain controlled. No new complaints. Denies chest pain, shortness of breath, calf pain, dizziness/lightheadedness. VITALS:  BP (!) 102/53   Pulse 102   Temp 99.2 °F (37.3 °C) (Oral)   Resp 18   Ht 6' (1.829 m)   Wt 157 lb (71.2 kg)   SpO2 91%   BMI 21.29 kg/m²     GENERAL: awake, AAx2  MUSCULOSKELETAL:   right lower extremity:  · Aquacel C/D/I  · Compartments soft and compressible, calf non-tender  · Palpable dorsalis pedis and posterior tibialis pulse, brisk cap refill to toes, foot warm and perfused  · Sensation intact to light touch in sural/deep peroneal/superficial peroneal/saphenous/posterior tibial nerve distributions to foot/ankle.   · Demonstrates foot drop on right side, -EHL, ankle ROM    Left upper extremity:  · Olecranon bursitis   · Minimal flucutance, -warmth  · -TTP L elbow  · Compartments soft and compressible  · +AIN/PIN/Ulnar nerve function intact grossly  · +Radial pulse, Brisk Cap refill, hand warm and perfused  · Sensation intact to touch in radial/ulnar/median nerve distributions to hand        CBC:   Lab Results   Component Value Date    WBC 6.9 07/14/2020    HGB 7.1 07/14/2020    HCT 22.5 07/14/2020     07/14/2020       ASSESSMENT  · S/p ORIF R Intertrochanteric fx     PLAN    WBAT RLE  L elbow bursitis aspirated yesterday  Deep venous thrombosis prophylaxis - aspirin, early mobilization  PT/OT  Pain Control: PO  Monitor H&H 7.1/22.5 will monitor  · Discuss with Dr. Slava Gan

## 2020-07-14 NOTE — PROGRESS NOTES
Comprehensive Nutrition Assessment    Type and Reason for Visit:  Initial, Positive Nutrition Screen    Nutrition Recommendations/Plan: Tube Feeding recommendation to better help meet nutritional needs. Recommend 1.5 Calorie with Fiber (Jevity 1.5) Bolus feedings TID (474ml TID) to provide 1422ml, 2133 calories, 91g protein, 872ml water. Nutrition Assessment:  Wife at bedside ; pt receiving bolus tube feedings ; wife states pt does drink Boost supplements at home ; physician ordered Ensure Enlive supplement BID ; pt meets criteria for severe malnutrition AEB muscle and fat wasting ; pt at further nutritional risk d/t increased needs from surgical wound healing (s/p femur fx surgery); hx of COPD ; noted dysphagia ; hx of throat CA s/p chemo/radiation ; will provide updated TF recommendations    Malnutrition Assessment:  Malnutrition Status:  Severe malnutrition    Context:  Chronic Illness     Findings of the 6 clinical characteristics of malnutrition:      Estimated Daily Nutrient Needs:  Energy (kcal):  9164-3712 (REE 1576 x 1.3 SF); Weight Used for Energy Requirements:  Current     Protein (g):  100-115 (1.3-1.5g/kg CBW);  Weight Used for Protein Requirements:  Current        Fluid (ml/day):  4004-1631; Weight Used for Fluid Requirements:  Current      Nutrition Related Findings:  +I&Os (+2.4 L), 1+ edema, A&O x 4, dentures at home, dry mucous membranes, active BS, gastrostomy, muscle and fat wasting, dysphagia      Wounds:  Surgical Wound(Incision x 1 noted to thigh)           Anthropometric Measures:  · Height: 6' (182.9 cm)  · Current Body Weight: 170 lb (77.1 kg)(7/14/20, bedscale per RD)   · Admission Body Weight: 157 lb (71.2 kg)(7/11/20, stated)    · Usual Body Weight: 160 lb (72.6 kg)(per wife ;  no weights available in EMR history from previous encounters)     · Ideal Body Weight: 178 lbs; 95.5 lbs   · BMI: 23.1  · BMI Categories: Normal Weight (BMI 22.0 to 24.9) age over 72           Nutrition Interventions:   Food and/or Nutrient Delivery:  Modify Tube Feeding  Nutrition Education/Counseling:  No recommendation at this time   Coordination of Nutrition Care:  Continued Inpatient Monitoring    Goals:  Tube feeding will meet nutritional needs with good tolerance       Nutrition Monitoring and Evaluation:   Behavioral-Environmental Outcomes:  Knowledge or Skill   Food/Nutrient Intake Outcomes:  Diet Advancement/Tolerance, Enteral Nutrition Intake/Tolerance  Physical Signs/Symptoms Outcomes:  Biochemical Data, Chewing or Swallowing, GI Status, Fluid Status or Edema, Hemodynamic Status, Meal Time Behavior, Nutrition Focused Physical Findings, Skin, Weight     Discharge Planning:     Too soon to determine     Electronically signed by Dozier Ahumada, RD, LD on 7/14/20 at 2:11 PM EDT    Contact: [phone]

## 2020-07-14 NOTE — PROGRESS NOTES
Attempted tx with pt, however pt currently unavailable for occupational therapy d/t receiving blood. Will attempt tx with pt at later time/date.  Michi Proctor, [address]

## 2020-07-15 VITALS
DIASTOLIC BLOOD PRESSURE: 54 MMHG | BODY MASS INDEX: 23.03 KG/M2 | OXYGEN SATURATION: 89 % | WEIGHT: 170 LBS | RESPIRATION RATE: 16 BRPM | HEIGHT: 72 IN | TEMPERATURE: 97.7 F | HEART RATE: 98 BPM | SYSTOLIC BLOOD PRESSURE: 99 MMHG

## 2020-07-15 LAB
ANION GAP SERPL CALCULATED.3IONS-SCNC: 6 MMOL/L (ref 7–16)
BASOPHILS ABSOLUTE: 0.02 E9/L (ref 0–0.2)
BASOPHILS RELATIVE PERCENT: 0.3 % (ref 0–2)
BUN BLDV-MCNC: 14 MG/DL (ref 8–23)
CALCIUM SERPL-MCNC: 8.2 MG/DL (ref 8.6–10.2)
CHLORIDE BLD-SCNC: 101 MMOL/L (ref 98–107)
CO2: 29 MMOL/L (ref 22–29)
CREAT SERPL-MCNC: 0.5 MG/DL (ref 0.7–1.2)
EOSINOPHILS ABSOLUTE: 0.15 E9/L (ref 0.05–0.5)
EOSINOPHILS RELATIVE PERCENT: 2 % (ref 0–6)
GFR AFRICAN AMERICAN: >60
GFR NON-AFRICAN AMERICAN: >60 ML/MIN/1.73
GLUCOSE BLD-MCNC: 135 MG/DL (ref 74–99)
HCT VFR BLD CALC: 24.3 % (ref 37–54)
HEMOGLOBIN: 7.9 G/DL (ref 12.5–16.5)
IMMATURE GRANULOCYTES #: 0.02 E9/L
IMMATURE GRANULOCYTES %: 0.3 % (ref 0–5)
LYMPHOCYTES ABSOLUTE: 0.97 E9/L (ref 1.5–4)
LYMPHOCYTES RELATIVE PERCENT: 13.1 % (ref 20–42)
MCH RBC QN AUTO: 33.2 PG (ref 26–35)
MCHC RBC AUTO-ENTMCNC: 32.5 % (ref 32–34.5)
MCV RBC AUTO: 102.1 FL (ref 80–99.9)
MONOCYTES ABSOLUTE: 0.89 E9/L (ref 0.1–0.95)
MONOCYTES RELATIVE PERCENT: 12.1 % (ref 2–12)
NEUTROPHILS ABSOLUTE: 5.33 E9/L (ref 1.8–7.3)
NEUTROPHILS RELATIVE PERCENT: 72.2 % (ref 43–80)
PDW BLD-RTO: 15.7 FL (ref 11.5–15)
PLATELET # BLD: 125 E9/L (ref 130–450)
PMV BLD AUTO: 12.3 FL (ref 7–12)
POTASSIUM SERPL-SCNC: 4 MMOL/L (ref 3.5–5)
RBC # BLD: 2.38 E12/L (ref 3.8–5.8)
SODIUM BLD-SCNC: 136 MMOL/L (ref 132–146)
WBC # BLD: 7.4 E9/L (ref 4.5–11.5)

## 2020-07-15 PROCEDURE — 96376 TX/PRO/DX INJ SAME DRUG ADON: CPT

## 2020-07-15 PROCEDURE — 85025 COMPLETE CBC W/AUTO DIFF WBC: CPT

## 2020-07-15 PROCEDURE — 6370000000 HC RX 637 (ALT 250 FOR IP): Performed by: ORTHOPAEDIC SURGERY

## 2020-07-15 PROCEDURE — 6360000002 HC RX W HCPCS: Performed by: ORTHOPAEDIC SURGERY

## 2020-07-15 PROCEDURE — 36415 COLL VENOUS BLD VENIPUNCTURE: CPT

## 2020-07-15 PROCEDURE — 97530 THERAPEUTIC ACTIVITIES: CPT

## 2020-07-15 PROCEDURE — 96372 THER/PROPH/DIAG INJ SC/IM: CPT

## 2020-07-15 PROCEDURE — 80048 BASIC METABOLIC PNL TOTAL CA: CPT

## 2020-07-15 RX ADMIN — HEPARIN SODIUM 5000 UNITS: 5000 INJECTION, SOLUTION INTRAVENOUS; SUBCUTANEOUS at 01:10

## 2020-07-15 RX ADMIN — KETOROLAC TROMETHAMINE 15 MG: 15 INJECTION, SOLUTION INTRAMUSCULAR; INTRAVENOUS at 11:24

## 2020-07-15 RX ADMIN — MAGNESIUM HYDROXIDE 30 ML: 400 SUSPENSION ORAL at 08:02

## 2020-07-15 RX ADMIN — OXYCODONE HYDROCHLORIDE 10 MG: 5 TABLET ORAL at 08:11

## 2020-07-15 ASSESSMENT — PAIN SCALES - GENERAL
PAINLEVEL_OUTOF10: 6
PAINLEVEL_OUTOF10: 7
PAINLEVEL_OUTOF10: 5

## 2020-07-15 ASSESSMENT — PAIN DESCRIPTION - DESCRIPTORS: DESCRIPTORS: SORE

## 2020-07-15 ASSESSMENT — PAIN DESCRIPTION - PAIN TYPE: TYPE: SURGICAL PAIN

## 2020-07-15 ASSESSMENT — PAIN DESCRIPTION - LOCATION: LOCATION: HIP

## 2020-07-15 ASSESSMENT — PAIN DESCRIPTION - PROGRESSION: CLINICAL_PROGRESSION: GRADUALLY IMPROVING

## 2020-07-15 ASSESSMENT — PAIN DESCRIPTION - ORIENTATION: ORIENTATION: RIGHT

## 2020-07-15 NOTE — DISCHARGE SUMMARY
Internal Medicine Progress Note     [facility]     Matilde Mason. Bertha Garnett., LOAN.[email or web address]. Kusum Meléndez D.O., JUAN LUIS Lama D.O. David Huynh, MSN, APRN, NP-C  Markus Guevara. Rickey Nance, MSN, APRN-CNP       Internal Medicine  Discharge Summary    NAME: Vanita Iverson  :  1950  MRN:  [id number]  PCP:Edward Thomasina Skiff, DO  ADMITTED: 2020      DISCHARGED: 7/15/20    ADMITTING PHYSICIAN: Matilde Nowak DO    CONSULTANT(S):   IP CONSULT TO INTERNAL MEDICINE  IP CONSULT TO ORTHOPEDIC SURGERY  IP CONSULT TO ANESTHESIOLOGY  IP CONSULT TO SOCIAL WORK  IP CONSULT TO SOCIAL WORK  IP CONSULT TO SOCIAL WORK  IP CONSULT TO SOCIAL WORK     ADMITTING DIAGNOSIS:   Closed intertrochanteric fracture of right femur, initial encounter [facility] [S72.141A]     DISCHARGE DIAGNOSES:   1. Mechanical fall resulting in a displaced intratrochanteric fracture of the right hip status post orthopedic repair  2. Postoperative blood loss anemia  3. CVA with residual right-sided deficits  4. Dysphagia status post PEG tube placement  5. Non-oxygen dependent COPD with chronic respiratory failure  6. Hyperlipidemia    BRIEF HISTORY OF PRESENT ILLNESS:   This is a 70-year-old  male who presented to the emergency department status post fall with complaint of right lower extremity pain. Patient does have history of CVA. He does have expressive aphasia therefore HPI is obtained from him as well as from chart review. The patient reports he was trying to let his dog out the back door today and that he lost his balance and fell. He denies loss of consciousness. He denied any lightheadedness or dizziness at the time. Denies having had hit his head. He reports no neck or back pain. Main complaint is that of right arm pain and right lower extremity pain.   Patient states status post stroke that he was left with right sided deficit.       Emergency room course: Vital signs: BP 148/83, temperature 98.5, pulse: 77, respirations: 18, SPO2 92%. X-rays were performed and revealed displaced intratrochanteric fracture of the right hip. A chest x-ray was also obtained which revealed no evidence of pneumonia or failure. A 6 mm nodular density was seen within the right perihilar region dedicated CT is recommended, and COPD. Consultation was had with Dr. Kika Love, orthopedic surgeon. Patient was in need of further evaluation and treatment was admitted with services of Dr. Leroy Cruz and Dr. Irma Baugh. LABS[de-identified]  Lab Results   Component Value Date    WBC 7.4 07/15/2020    HGB 7.9 (L) 07/15/2020    HCT 24.3 (L) 07/15/2020     (L) 07/15/2020     07/15/2020    K 4.0 07/15/2020     07/15/2020    CREATININE 0.5 (L) 07/15/2020    BUN 14 07/15/2020    CO2 29 07/15/2020    GLUCOSE 135 (H) 07/15/2020    ALT 20 07/12/2020    AST 11 07/12/2020    INR 1.2 07/11/2020     Lab Results   Component Value Date    INR 1.2 07/11/2020    INR 1.0 06/19/2019    INR 1.0 09/29/2012    PROTIME 13.2 (H) 07/11/2020    PROTIME 11.6 06/19/2019    PROTIME 11.8 09/29/2012      Lab Results   Component Value Date    TSH 0.521 07/12/2020     Lab Results   Component Value Date    TRIG 35 07/12/2020    TRIG 66 06/20/2019    TRIG 49 02/25/2015     Lab Results   Component Value Date    HDL 35 07/12/2020    HDL 39 06/20/2019    HDL 59 02/25/2015     Lab Results   Component Value Date    LDLCALC 22 07/12/2020    LDLCALC 94 06/20/2019    [address] 02/25/2015     Lab Results   Component Value Date    LABA1C 5.2 07/12/2020       IMAGING:  Xr Hip Right (2-3 Views)    Result Date: 7/11/2020  EXAMINATION: 4 XRAY VIEWS OF THE RIGHT FEMUR; TWO XRAY VIEWS OF THE RIGHT HIP 7/11/2020 12:33 pm COMPARISON: None. HISTORY: ORDERING SYSTEM PROVIDED HISTORY: Pain TECHNOLOGIST PROVIDED HISTORY: Reason for exam:->Pain FINDINGS: Hip: There is an intratrochanteric fracture of the right hip which is mildly displaced.   The femoral head is normally aligned with the acetabulum. There is no fracture of the pelvis. Femur: The distal right femur is intact. There are mild degenerative changes of the right knee. Displaced intratrochanteric fracture of the right hip. Xr Femur Right (min 2 Views)    Result Date: 7/14/2020  EXAMINATION: 4 X-RAY VIEWS OF THE RIGHT FEMUR 7/13/2020 8:48 pm COMPARISON: 07/11/2020 HISTORY: ORDERING SYSTEM PROVIDED HISTORY: Post op TECHNOLOGIST PROVIDED HISTORY: Reason for exam:->Post op Follow-up FINDINGS: Interval open reduction internal fixation right intertrochanteric fracture utilizing intramedullary emile with proximal and distal screw fixations. The fracture appears in near anatomic alignment. No hardware complication. ORIF right intertrochanteric fracture without hardware complication. Xr Femur Right (min 2 Views)    Result Date: 7/11/2020  EXAMINATION: 4 XRAY VIEWS OF THE RIGHT FEMUR; TWO XRAY VIEWS OF THE RIGHT HIP 7/11/2020 12:33 pm COMPARISON: None. HISTORY: ORDERING SYSTEM PROVIDED HISTORY: Pain TECHNOLOGIST PROVIDED HISTORY: Reason for exam:->Pain FINDINGS: Hip: There is an intratrochanteric fracture of the right hip which is mildly displaced. The femoral head is normally aligned with the acetabulum. There is no fracture of the pelvis. Femur: The distal right femur is intact. There are mild degenerative changes of the right knee. Displaced intratrochanteric fracture of the right hip. Ct Chest W Contrast    Result Date: 7/13/2020  EXAMINATION: CT OF THE CHEST WITH CONTRAST 7/13/2020 3:03 pm TECHNIQUE: CT of the chest was performed with the administration of intravenous contrast. Multiplanar reformatted images are provided for review. Dose modulation, iterative reconstruction, and/or weight based adjustment of the mA/kV was utilized to reduce the radiation dose to as low as reasonably achievable.  COMPARISON: Chest radiograph from 2 days prior HISTORY: ORDERING SYSTEM PROVIDED HISTORY: Hilar mass evaluation TECHNOLOGIST PROVIDED HISTORY: Reason for exam:->Hilar mass evaluation FINDINGS: Mediastinum: There is no enlarged lymph node. The heart and great vessels sizes are normal.  There is no pericardial effusion. Lungs/pleura: No lung nodule or mass is identified to correspond to the questioned density on recent chest radiograph. There is moderate upper lobe predominant emphysema. There is bilateral lower lobe scarring or atelectasis. There are small amounts of retained secretions in the trachea. No pleural effusion. Upper Abdomen: No acute abnormality. Soft Tissues/Bones: No aggressive bone lesion. No lung nodule or mass. Moderate emphysema. Xr Chest 1 Vw    Result Date: 7/11/2020  EXAMINATION: ONE XRAY VIEW OF THE CHEST 7/11/2020 1:10 pm COMPARISON: None. HISTORY: ORDERING SYSTEM PROVIDED HISTORY: hip fracture TECHNOLOGIST PROVIDED HISTORY: Reason for exam:->hip fracture FINDINGS: There is hyperinflation of the lungs and flattening of diaphragms consistent with COPD. There is no pulmonary infiltrate. Within the right mid lung zone there is a nodular focus. The nodular density measures approximately 6 mm. There is no pleural effusion. The cardiac silhouette is within normal limits. .     1. There is no evidence of pneumonia or failure. 2. 6 mm nodular density seen within the right perihilar region. Dedicated follow-up CT of the thorax is recommended for more detailed evaluation. 3. COPD     Fluoro For Surgical Procedures    Result Date: 7/12/2020  EXAMINATION: SPOT FLUOROSCOPIC IMAGES 7/12/2020 10:16 am TECHNIQUE: Fluoroscopy was provided by the radiology department for procedure. Radiologist was not present during examination.  FLUOROSCOPY DOSE AND TYPE OR TIME AND EXPOSURES: Fluoro time 1.25 minutes, total dose 13.88 mGy COMPARISON: None HISTORY: ORDERING SYSTEM PROVIDED HISTORY: Fracture, hip, right, closed, initial encounter [facility]) TECHNOLOGIST PROVIDED HISTORY: Reason for exam:->orif rt hip Intraprocedural imaging. FINDINGS: 4 spot images of the right hip were obtained. Intraprocedural fluoroscopic spot images as above. See separate procedure report for more information. HOSPITAL COURSE:   Severiano Bingham tolerated orthopedic intervention without complication. He did develop mild postoperative blood loss anemia and required packed red blood cell transfusion. Hemoglobin stabilized and he was resumed on his antiplatelet therapy in the setting of recent CVA. Therapy provided consultation and recommendations are for skilled nursing facility placement for ongoing rehabilitation. During the hospitalization, he also underwent work-up for an abnormality identified on chest x-ray. CT did not confirm any mass or suspicious process. Family has been updated accordingly. Otherwise, the patient improved and was deemed acceptable for transfer to the nursing home facility. BRIEF PHYSICAL EXAMINATION AND LABORATORIES ON DAY OF DISCHARGE:  VITALS:  BP (!) 99/54   Pulse 98   Temp 97.7 °F (36.5 °C) (Oral)   Resp 16   Ht 6' (1.829 m)   Wt 170 lb (77.1 kg) Comment: bedscale per RD  SpO2 (!) 88%   BMI 23.06 kg/m²     HEENT:  PERRLA. EOMI. Sclera clear. Buccal mucosa moist.    Neck:  Supple. Trachea midline. No thyromegaly. No JVD. No bruits. Heart:  Rhythm regular, rate controlled. No murmurs. Lungs:  Symmetrical. Clear to auscultation bilaterally. No wheezes. No rhonchi. No rales. Abdomen: Soft. Non-tender. Non-distended. Bowel sounds positive. No organomegaly or masses. No pain on palpation. PEG tube is in place. Extremities:  Peripheral pulses present. No peripheral edema. No ulcers. Postoperative dressings were in place to the surgical extremity. Neurologic: Chronic right-sided deficits related to previous CVA. Skin:  No petechia. No hemorrhage. No wounds. DISPOSITION:  The patient's condition is good.   At this time the patient is without objective evidence of an 7/15/2020  7:58 AM

## 2020-07-15 NOTE — CARE COORDINATION
SS Note/Discharge plan:  COVID NEGATIVE: 7/11. Contacted [employer] [facility] [employer] confirming pt's transfer for today at 11:40am [facility], pt and pt's wife notified and agreeable to transfer arrangements, nursing informed. Electronically signed by HOSSEIN Sifuentes on 7/15/2020 at 10:47 AM

## 2020-07-15 NOTE — PROGRESS NOTES
Department of Orthopedic Surgery  Resident Progress Note    Patient seen and examined this morning. Pain controlled. No new complaints. Denies chest pain, shortness of breath, calf pain, dizziness/lightheadedness. VITALS:  BP (!) 99/54   Pulse 98   Temp 97.7 °F (36.5 °C) (Oral)   Resp 16   Ht 6' (1.829 m)   Wt 170 lb (77.1 kg) Comment: bedscale per RD  SpO2 (!) 88%   BMI 23.06 kg/m²     GENERAL: awake, AAx2  MUSCULOSKELETAL:   right lower extremity:  · Aquacel C/D/I  · Compartments soft and compressible, calf non-tender  · Palpable dorsalis pedis and posterior tibialis pulse, brisk cap refill to toes, foot warm and perfused  · Sensation intact to light touch in sural/deep peroneal/superficial peroneal/saphenous/posterior tibial nerve distributions to foot/ankle.   · Demonstrates foot drop on right side, -EHL, ankle ROM    Left upper extremity:  · Olecranon bursitis   · Minimal flucutance, -warmth  · -TTP L elbow  · Compartments soft and compressible  · +AIN/PIN/Ulnar nerve function intact grossly  · +Radial pulse, Brisk Cap refill, hand warm and perfused  · Sensation intact to touch in radial/ulnar/median nerve distributions to hand        CBC:   Lab Results   Component Value Date    WBC 7.4 07/15/2020    HGB 7.9 07/15/2020    HCT 24.3 07/15/2020     07/15/2020       ASSESSMENT  · S/p ORIF R Intertrochanteric fx     PLAN    WBAT RLE  Deep venous thrombosis prophylaxis - aspirin, early mobilization  PT/OT  Pain Control: PO  Monitor H&H 7.9/24.3 will monitor  · Discuss with Dr. Raza Casillas

## 2020-07-15 NOTE — PROGRESS NOTES
06/25/2012    right lateral pharynx biopsy. Precautions: falls, alarm and confusion, history CVA R hemiparesis, sling Right upper extremitiy for subluxation, throat cancer, R jaw repair, PWB (partial weight bearing) Right, AFO, tube feed,    SUBJECTIVE:    Social history: Patient lives with spouse in a capecod able to reside first floor  with 1 + 1+3 steps to enter with rail, last 3 only Tub shower     Equipment owned: Bedside commode, Tub transfer bench, Hemiwalker/cane and Quad cane, sling Right upper extremitiy     AM-PAC Basic Mobility        AM-[facility] Mobility Inpatient   How much difficulty turning over in bed?: A Lot  How much difficulty sitting down on / standing up from a chair with arms?: A Lot  How much difficulty moving from lying on back to sitting on side of bed?: A Lot  How much help from another person moving to and from a bed to a chair?: A Lot  How much help from another person needed to walk in hospital room?: Total  How much help from another person for climbing 3-5 steps with a railing?: Total  -PAC Inpatient Mobility Raw Score : 10  AM-PAC Inpatient T-Scale Score : 32.29  Mobility Inpatient CMS 0-100% Score: 76.75  Mobility Inpatient CMS G-Code Modifier : CL    Nursing cleared patient for PT treatment. OBJECTIVE:   Initial Evaluation  Date: 7/13/2020 Treatment Date:   7/15/2020  Short Term/ Long Term   Goals   Was pt agreeable to Eval/treatment? Yes   Yes To be met in 3 days   Pain level   8/10  Right lower extremity  No number assigned to right lower extremity pain    Bed Mobility  Rolling: Moderate assist of 1    Supine to sit: Moderate assist of  2    Sit to supine: Moderate assist of  2    Scooting: Moderate assist of  2   Rolling: Moderate assist of 1   Supine to sit: Moderate assist of  2   Sit to supine:  Moderate assist of  2   Scooting: Maximal assist of 1  Rolling: Minimal assist of 1    Supine to sit: Minimal assist of 1    Sit to supine: Minimal assist of 1    Scooting: Minimal assist of 1     Transfers Sit to stand: Maximal assist of  2 chux as sling Sit to stand: Maximal assist of  2 x 5 reps decreasing to mod assist of two  using   and chux for support  Sit to stand: Moderate assist of 1 hemicane   Ambulation    not assessed patient unable to advance lower extremities  Not assessed due to unable to advance bilateral lower extremities  10 feet using  mellisa cane/walker with Moderate assist of 1    Stair negotiation: ascended and descended   Not assessed    Not assessed   5 steps 1 rail moderate    ROM Within functional limits except right hemibody due to history CVA and right hip pain    Increase range of motion 10% of affected joints    Strength BUE: refer to OT eval/5  RLE:  0/5  LLE:  3+/5   Increase strength in affected mm groups by 1/3 grade   Balance Sitting EOB:  fair off loads right hip  Dynamic Standing:  poor hemiwalker Sitting EOB: fair   Dynamic Standing: poor due to right side weakness Sitting EOB:  good -  Dynamic Standing: fair hemicane/walker      Patient is Alert & Oriented x person, place, time and situation and follows directions    Patient education  Patient was educated and facilitated on techniques to increase safety and independence with bed mobility, balance, functional transfers, and functional mobility. Patient was explained the the benefits of mobility and risks of immobility. Patient response to education:   Pt verbalized understanding Pt demonstrated skill Pt requires further education in this area    Yes Partial Yes      Treatment: Patient practiced and was instructed in the following treatment:      Patient transferred to side of bed and sat up x 25 minutes to increase dynamic sitting balance and activity tolerance. Patient stood x 5 reps using hemicane. . Patient was returned to bed at end of treatment.  Patient required maximal assistance to scoot up in bed, right lower extremity and right upper extremity were elevated on pillows, and head of bed was elevated. Ice applied to right upper extremity and right lower extremity. Therapeutic Exercises: Patient performed  x 10 reps. Verbal cues were given for correct technique and to perform 2-3x daily. Patient unable to perform hip abduction or ankle pumps with right lower extremity. At end of session, patient was in bed with alarm activated, call light and phone within reach, all lines were intact, and nursing was notified. Family was present. ASSESSMENT:  Patient was able to stand using hemicane but was unable to take steps due to weakness and pain. Patient required encouragement throughout. Pt's wife present and supportive and able to direct care and encourage patient     Patient would benefit from continued skilled Physical Therapy to improve functional independence and quality of life. PLAN:    Patient is making good progress towards established goals, will continue with current plan of care.       Time in: 932  Time out: 1012    Total Treatment Time: 40 minutes    CPT codes:  Therapeutic activities (98958)   40 minutes  3 unit(s)    Ynes Langston [facility]  LIC# [id number]

## 2020-07-15 NOTE — PROGRESS NOTES
OT BEDSIDE TREATMENT NOTE      Date:7/15/2020  Patient Name: Carolyn Robertson  MRN: [id number]  : 1950  Room: [address] [place] [address]     Referring Provider: DO Nasim Min OT: Siobhan Hodges OTR/L [id number]     Placement Recommendation: Inpatient Rehab   Recommended Adaptive Equipment: none      [facility]   AM-PAC Daily Activity Inpatient   How much help for putting on and taking off regular lower body clothing?: Total  How much help for Bathing?: A Lot  How much help for Toileting?: A Lot  How much help for putting on and taking off regular upper body clothing?: A Lot  How much help for taking care of personal grooming?: A Lot  How much help for eating meals?: A Little  AM-PAC Inpatient Daily Activity Raw Score: 12     Diagnosis:   1. Closed 2-part intertrochanteric fracture of right femur, initial encounter ([facility]    2. Fracture, hip, right, closed, initial encounter [facility])      Pertinent Medical History:   Past Medical History        Past Medical History:   Diagnosis Date    Cancer ([facility] 2012     throat-underwent chemo and radiaiton    COPD (chronic obstructive pulmonary disease) ([facility]      CVA (cerebral vascular accident) ([facility]       right sided residual- uses a walker for ambulation         Precautions:  falls, ORIF: R hip, Partial Weight Bearing: R LE, hx of CVA with R hemibody  Pain Scale: Numeric Rate: unquantified pain in R hip; Nursing aware. ; wife present during session     Social history: with family: spouse     Home architecture: single family home, [facility], 1+1+3 steps to enter with rail to the last 3 steps, tub shower. PLOF: needs assistance with BADL and IADL   Equipment owned: quad cane, bedside commode, tub transfer bench, R UE sling d/t subluxation   Cognition: oriented x 3; follows 2 step directions.                fair  Problem solving skills              fair  Memory               fair  Sequencing  Communication: intact   Visual perceptual skills: intact                Glasses: no   Edema: no     Sensation: intact   Hand Dominance:  Left     X Right       Left Right Comment   Passive range of motion WFL R hemibody     Active range of motion WFL  R hemibody      Muscle Grade 4/5 0/5     /pinch Strength Intact  Absent        Functional Assessment:    Initial Eval Status  Date: 7/13/2020 Treatment Status  Date:7/15/2020 STGs = LTGs  Time frame: 5-7 days   Feeding Supervision  Mod A to unscrew lid on water bottle; pt able to hold onto water bottle and bring to mouth with L hand Independent    Grooming Moderate Assist  N/T Independent    UB Dressing Maximal Assist  Max A to tie back of gown; Min A to don/doff shoulder sling Minimal Assist    LB Dressing Dependent  Dep to don/doff shoes and R AFO Minimal Assist    Bathing Maximal Assist  N/T Minimal Assist    Toileting Dependent   Pt used urinal when seated EOB; pt's wife assisted in placement of urinal; pt able to hold urinal and complete hygiene when seated EOB Minimal Assist    Bed Mobility  Supine to sit: Moderate Assist x 2  Scooting: Moderate Assist x 2  Sit to supine: Moderate Assist x 2  Mod A x 2 for supine to sit; mod A x 2 for scooting; mod A x 2/max A x 2 for sit to supine; assist to guide UB and BLE's to/from supine Supine to sit: Supervision   Sit to supine: Supervision    Functional Transfers Maximal assist x 2 from EOB to mellisa walker  Max A x 2 progressing to mod A x 2  for sit to stand transfers to/ from EOB x 5 reps with  use of  hemiwalker and use of chux pad as sling; cuing and assist for upright sitting/standing balance;  pt unable to advance feet at this time; pt initially demonstrated strong L lateral flexion upon standing;   cuing and assist for upright position Minimal Assist    Functional Mobility N/T   N/T Modified [place]    Activity Tolerance fair  fair/fair minus Good         Comments: Patient cleared by nursing staff. Upon arrival pt supine in bed.    Pt educated with regards to bed mobility, hand placement, safety awareness, static sitting balance,  standing balance, LE/UE dressing, self feeding, ECT's. At end of session pt supine in bed with all lines and tubes intact, call light within reach. Positioning provided. Overall, pt demonstrated decreased independence and safety during completion of ADL/functional transfers/mobility tasks. Pt would benefit from continued skilled OT to increase safety and independence with completion of ADL/IADL tasks for functional independence and quality of life. · Pt has made fair progress towards set goals as noted through :  · Pt willing to participate in therapy, despite pain and fatigue; Pt provided with encouragement. · Pt able to assist in donning R UE sling  · through completion of bed mobility, sitting balance/tolerance, standing balance/tolerance.   Min verbal cuing for safety awareness, joint protection, upright position   · Continue with current plan of care    Treatment Time In: 9:45 AM   Treatment Time Out: 10:12 AM           Treatment Charges: Mins Units   ADL/Home Mgt     [id number]     Thera Activities     [id number] 27 2   Ther Ex                 [id number]     Manual Therapy    [id number] [facility]     Neuro Re-ed         [id number]     Orthotic manage/training                               [id number]     Non Billable Time     Total Timed Treatment 27 [address]

## 2020-07-19 LAB
BODY FLUID CULTURE, STERILE: NORMAL
GRAM STAIN RESULT: NORMAL

## 2020-07-27 ENCOUNTER — OFFICE VISIT (OUTPATIENT)
Dept: ORTHOPEDIC SURGERY | Age: 70
End: 2020-07-27

## 2020-07-27 VITALS — BODY MASS INDEX: 23.03 KG/M2 | HEIGHT: 72 IN | TEMPERATURE: 98 F | WEIGHT: 170 LBS

## 2020-07-27 PROCEDURE — 99024 POSTOP FOLLOW-UP VISIT: CPT | Performed by: ORTHOPAEDIC SURGERY

## 2020-07-27 NOTE — PROGRESS NOTES
Chief Complaint:   Chief Complaint   Patient presents with    Leg Pain     Right Femur FX IM Nailing DOS 7/12/2020       Zaina Little is 2 weeks postop locked long cephalo-medullary nail right femur for an intertrochanteric fracture. He is doing fairly well. He has been moving a whole lot. He previously had suffered a stroke which affected his right side. This made things somewhat difficult. He has been treated in [facility] post hospitalization. His wife is ready to move to [facility] in the next couple of weeks. She will get him moving his first range of motion in the hip and leg. She seems very dedicated. Allergies; medications; past medical, surgical, family, and social history; and problem list have been reviewed today and updated as indicated in this encounter seen below. Exam: Wound is doing well. Well approximated with staples in place. They were removed here today without incident. Range of motion of the right hip is good though little guarded. Knee range of motion is good as well. There is mild tenderness laterally as expected so soon is postop. Neurovascular function is good grossly distal with the exception of the effects of the stroke. Radiographs: Intraoperative imaging was explained to the patient and his wife who were both interested in seeing what we did. ASSESSMENT:    Amanda Rockwell was seen today for leg pain. Diagnoses and all orders for this visit:    Closed nondisplaced intertrochanteric fracture of right femur, initial encounter ([facility] [address]        PLAN: Range of motion exercises right lower extremity. He may partial weight-bear and uses ambulatory aids to maintain safety. She would rather come back here for follow-up then seek care in [facility] and will see him in 6 weeks.   We will x-ray his femur at that time    Return in about 6 weeks (around 9/7/2020) for R femur X.       Current Outpatient Medications   Medication Sig Dispense Refill    aspirin 81 MG chewable tablet 81 mg by Per G Tube route nightly      atorvastatin (LIPITOR) 40 MG tablet 40 mg by Per G Tube route nightly      clopidogrel (PLAVIX) 75 MG tablet 75 mg by Per G Tube route nightly      polyethylene glycol (GLYCOLAX) powder Take 17 g by mouth daily      sodium chloride (OCEAN, BABY AYR) 0.65 % nasal spray 1 spray by Nasal route as needed for Congestion       No current facility-administered medications for this visit.       Facility-Administered Medications Ordered in Other Visits   Medication Dose Route Frequency Provider Last Rate Last Dose    filgrastim (NEUPOGEN) injection 300 mcg  300 mcg Subcutaneous Once Jose Hughes MD           Patient Active Problem List   Diagnosis    Esophageal cancer ([address]    Oral pharyngeal candidiasis    Emphysematous COPD ([address]    Mucositis due to antineoplastic therapy    Thrush of mouth and esophagus (HCC)    Pancytopenia (HCC)    Febrile neutropenia (HCC)    Acute ischemic cerebrovascular accident (CVA) involving left middle cerebral artery [facility])    Cerebrovascular accident (CVA) due to occlusion of middle cerebral artery ([address]    Dysphagia, oropharyngeal    Severe protein-calorie malnutrition ([address]    Carotid artery stenosis, symptomatic, right    Hypernatremia    Acute hypoxemic respiratory failure ([address]    Painless hematuria    Right hip fracture (HCC)       Past Medical History:   Diagnosis Date    Cancer ([address] 06/25/2012    throat-underwent chemo and radiaiton    COPD (chronic obstructive pulmonary disease) ([address]     CVA (cerebral vascular accident) ([address]     right sided residual- uses a walker for ambulation       Past Surgical History:   Procedure Laterality Date    DENTAL SURGERY Bilateral 03/26/2018    FEMUR FRACTURE SURGERY Right 7/12/2020    FEMUR OPEN REDUCTION INTERNAL FIXATION performed by Kalyan Steele DO at [address] [facility]      right thumb    MOUTH BIOPSY

## 2020-10-20 PROBLEM — G81.91 RIGHT HEMIPARESIS (HCC): Status: ACTIVE | Noted: 2020-10-20

## 2020-10-20 PROBLEM — R47.01 EXPRESSIVE APHASIA: Status: ACTIVE | Noted: 2020-10-20

## 2020-10-20 PROBLEM — I65.22 STENOSIS OF LEFT CAROTID ARTERY: Status: ACTIVE | Noted: 2020-10-20

## 2020-10-30 ENCOUNTER — HOSPITAL ENCOUNTER (OUTPATIENT)
Dept: ULTRASOUND IMAGING | Age: 70
Discharge: HOME | End: 2020-10-30
Attending: INTERNAL MEDICINE
Payer: MEDICARE

## 2020-10-30 DIAGNOSIS — I65.22 STENOSIS OF LEFT CAROTID ARTERY: ICD-10-CM

## 2020-10-30 PROCEDURE — 93880 EXTRACRANIAL BILAT STUDY: CPT

## 2020-11-02 NOTE — PROGRESS NOTES
Duplex Doppler ultrasound of Carotids report reviewed and shows high grade blockage on the left. Recommend consultation with vascular surgeon.   Please inform the patient

## 2020-11-02 NOTE — PROGRESS NOTES
I called and notified the pt of these results/recommendations. The pt verbalized understanding. A referral was place for Vascular Surg.

## 2020-11-11 ENCOUNTER — HOSPITAL ENCOUNTER (OUTPATIENT)
Dept: CT IMAGING | Age: 70
Discharge: HOME | End: 2020-11-11
Attending: INTERNAL MEDICINE
Payer: MEDICARE

## 2020-11-11 DIAGNOSIS — I65.22 STENOSIS OF LEFT CAROTID ARTERY: ICD-10-CM

## 2020-11-11 PROCEDURE — 74011000636 HC RX REV CODE- 636: Performed by: INTERNAL MEDICINE

## 2020-11-11 PROCEDURE — 70498 CT ANGIOGRAPHY NECK: CPT

## 2020-11-11 PROCEDURE — 74011000258 HC RX REV CODE- 258: Performed by: INTERNAL MEDICINE

## 2020-11-11 RX ORDER — SODIUM CHLORIDE 0.9 % (FLUSH) 0.9 %
10 SYRINGE (ML) INJECTION
Status: COMPLETED | OUTPATIENT
Start: 2020-11-11 | End: 2020-11-11

## 2020-11-11 RX ADMIN — IOPAMIDOL 50 ML: 755 INJECTION, SOLUTION INTRAVENOUS at 14:19

## 2020-11-11 RX ADMIN — SODIUM CHLORIDE 100 ML: 900 INJECTION, SOLUTION INTRAVENOUS at 14:19

## 2020-11-11 RX ADMIN — Medication 10 ML: at 14:19

## 2020-11-11 NOTE — PROGRESS NOTES
CT Angiogram of neck report reviewed and shows a high grade stenosis. Recommend vascular surgery consultation.  Please inform the patient

## 2020-11-11 NOTE — PROGRESS NOTES
I called and notified the pts wife, Jayy Yeager (on ROI), of these results/recommendations.   The pts wife verbalized understanding and stated they have an appt on Friday with the Vascular Dr.

## 2021-06-15 ENCOUNTER — HOSPITAL ENCOUNTER (OUTPATIENT)
Dept: ULTRASOUND IMAGING | Age: 71
Discharge: HOME | End: 2021-06-15
Attending: INTERNAL MEDICINE
Payer: MEDICARE

## 2021-06-15 DIAGNOSIS — Z13.6 SCREENING FOR AAA (ABDOMINAL AORTIC ANEURYSM): ICD-10-CM

## 2021-06-15 PROCEDURE — 93978 VASCULAR STUDY: CPT

## 2021-06-15 NOTE — PROGRESS NOTES
Aorta duplex doppler ultrasound report reviewed and it was negative for an aortic aneurysm (Diameter > 3 cm)

## 2021-12-12 ENCOUNTER — APPOINTMENT (OUTPATIENT)
Dept: CT IMAGING | Age: 71
End: 2021-12-12
Attending: EMERGENCY MEDICINE
Payer: MEDICARE

## 2021-12-12 ENCOUNTER — HOSPITAL ENCOUNTER (EMERGENCY)
Age: 71
Discharge: HOME | End: 2021-12-12
Attending: EMERGENCY MEDICINE
Payer: MEDICARE

## 2021-12-12 VITALS
DIASTOLIC BLOOD PRESSURE: 67 MMHG | OXYGEN SATURATION: 96 % | HEART RATE: 81 BPM | SYSTOLIC BLOOD PRESSURE: 120 MMHG | WEIGHT: 160 LBS | BODY MASS INDEX: 22.32 KG/M2 | RESPIRATION RATE: 18 BRPM | TEMPERATURE: 97.3 F

## 2021-12-12 DIAGNOSIS — S01.80XA OPEN WOUND OF FACE, INITIAL ENCOUNTER: Primary | ICD-10-CM

## 2021-12-12 PROCEDURE — 70486 CT MAXILLOFACIAL W/O DYE: CPT

## 2021-12-12 PROCEDURE — 99283 EMERGENCY DEPT VISIT LOW MDM: CPT

## 2021-12-12 RX ORDER — CEPHALEXIN 500 MG/1
500 CAPSULE ORAL 3 TIMES DAILY
Qty: 21 CAPSULE | Refills: 0 | Status: SHIPPED | OUTPATIENT
Start: 2021-12-12 | End: 2021-12-19

## 2021-12-12 NOTE — ED TRIAGE NOTES
Patient presents from home with right jaw wound patient with jaw reconstruction in 2019. Patient with history of having scab at the area. Patient wife states that this morning patient scab came off and noted metal showing underneath. No bleeding noted. No redness/fever.

## 2021-12-12 NOTE — ED PROVIDER NOTES
50-year-old white male underwent right jaw reconstructive surgery in 2019 due to a fracture that occurred while he was being prepared for dental implants. Since then he has had a recurrent wound to his right jaw. His wife reports that periodically the scab will come off and expose scar tissue underneath. Today, when the scab came off there was metal hardware exposed. No other complaints at this time. The history is provided by the patient.    Wound Check          Past Medical History:   Diagnosis Date    Cancer [facility] [address]     throat    Chronic obstructive pulmonary disease (HCC)     Hypercholesterolemia     Stroke [facility])        Past Surgical History:   Procedure Laterality Date    HX COLONOSCOPY      HX ORTHOPAEDIC Right 2019    hip replacement    HX ORTHOPAEDIC Right 01/2019    jaw frx    HX ORTHOPAEDIC Right 07/2020    fracture repair of femur/hip         Family History:   Problem Relation Age of Onset    Lupus Mother     No Known Problems Father     Cancer Sister         esophageal    No Known Problems Brother     No Known Problems Sister     No Known Problems Brother     No Known Problems Brother        Social History     Socioeconomic History    Marital status:      Spouse name: Not on file    Number of children: Not on file    Years of education: Not on file    Highest education level: Not on file   Occupational History    Occupation:    Tobacco Use    Smoking status: Current Every Day Smoker     Packs/day: 0.50     Years: 3.00     Pack years: 1.50     Types: Cigarettes    Smokeless tobacco: Never Used    Tobacco comment: 8-10 per day   Vaping Use    Vaping Use: Never used   Substance and Sexual Activity    Alcohol use: Not Currently    Drug use: Never    Sexual activity: Not on file   Other Topics Concern    Not on file   Social History Narrative    Not on file     Social Determinants of Health     Financial Resource Strain:     Difficulty of Paying Living Expenses: Not on file   Food Insecurity:     Worried About Running Out of Food in the Last Year: Not on file    Berenice of Food in the Last Year: Not on file   Transportation Needs:     Lack of Transportation (Medical): Not on file    Lack of Transportation (Non-Medical): Not on file   Physical Activity:     Days of Exercise per Week: Not on file    Minutes of Exercise per Session: Not on file   Stress:     Feeling of Stress : Not on file   Social Connections:     Frequency of Communication with Friends and Family: Not on file    Frequency of Social Gatherings with Friends and Family: Not on file    Attends [employer] Services: Not on file    Active Member of [address] [place] [address] [employer] or Organizations: Not on file    Attends Club or Organization Meetings: Not on file    Marital Status: Not on file   Intimate Partner Violence:     Fear of Current or Ex-Partner: Not on file    Emotionally Abused: Not on file    Physically Abused: Not on file    Sexually Abused: Not on file   Housing Stability:     Unable to Pay for Housing in the Last Year: Not on file    Number of [place] in the Last Year: Not on file    Unstable Housing in the Last Year: Not on file         ALLERGIES: Patient has no known allergies. Review of Systems   Constitutional: Negative for fever. Respiratory: Negative for shortness of breath. Gastrointestinal: Negative for vomiting. Neurological: Negative for headaches. Vitals:    12/12/21 1150   BP: 116/64   Pulse: 87   Resp: 18   Temp: 97.3 °F (36.3 °C)   SpO2: 96%   Weight: 72.6 kg (160 lb)            Physical Exam  Vitals and nursing note reviewed. Constitutional:       General: He is not in acute distress. Appearance: Normal appearance. He is not toxic-appearing. HENT:      Head: Normocephalic and atraumatic. Nose: Nose normal.      Mouth/Throat:      Mouth: Mucous membranes are moist.      Comments:  At the angle of the right mandible there is a crusting lesion with exposed hardware. The wound is approximately 2 cm in diameter. Eyes:      Pupils: Pupils are equal, round, and reactive to light. Cardiovascular:      Rate and Rhythm: Normal rate. Pulmonary:      Effort: Pulmonary effort is normal.   Musculoskeletal:      Cervical back: Normal range of motion and neck supple. Skin:     General: Skin is warm and dry. Neurological:      Mental Status: He is alert and oriented to person, place, and time. Psychiatric:         Mood and Affect: Mood normal.         Behavior: Behavior normal.          MDM  Number of Diagnoses or Management Options  Diagnosis management comments: Pain and shows soft tissue defect extending down to the hardware on the right side mandibular. Cannot rule out osteonecrosis or osteomyelitis. Findings were discussed with plastic surgery who will follow the patient in the office for definitive management. In the meantime will place on antibiotics.        Amount and/or Complexity of Data Reviewed  Tests in the radiology section of CPT®: ordered and reviewed  Discuss the patient with other providers: yes  Independent visualization of images, tracings, or specimens: yes    Risk of Complications, Morbidity, and/or Mortality  Presenting problems: moderate  Diagnostic procedures: moderate  Management options: moderate           Procedures

## 2021-12-12 NOTE — ED NOTES
I have reviewed discharge instructions with the patient and caregiver. The patient and caregiver verbalized understanding. Patient left ED via Discharge Method: wheelchair to Home with family. Opportunity for questions and clarification provided. Patient given 1 scripts. To continue your aftercare when you leave the hospital, you may receive an automated call from our care team to check in on how you are doing. This is a free service and part of our promise to provide the best care and service to meet your aftercare needs.  If you have questions, or wish to unsubscribe from this service please call [phone]. Thank you for Choosing our [facility] Emergency Department.

## 2022-03-19 ENCOUNTER — ANESTHESIA EVENT (OUTPATIENT)
Dept: SURGERY | Age: 72
End: 2022-03-19
Payer: MEDICARE

## 2022-03-19 PROBLEM — G81.91 RIGHT HEMIPARESIS (HCC): Status: ACTIVE | Noted: 2020-10-20

## 2022-03-19 PROBLEM — R47.01 EXPRESSIVE APHASIA: Status: ACTIVE | Noted: 2020-10-20

## 2022-03-19 PROBLEM — I65.22 STENOSIS OF LEFT CAROTID ARTERY: Status: ACTIVE | Noted: 2020-10-20

## 2022-03-22 ENCOUNTER — HOSPITAL ENCOUNTER (OUTPATIENT)
Age: 72
Setting detail: OBSERVATION
Discharge: HOME | End: 2022-03-23
Attending: SURGERY | Admitting: SURGERY
Payer: MEDICARE

## 2022-03-22 ENCOUNTER — ANESTHESIA (OUTPATIENT)
Dept: SURGERY | Age: 72
End: 2022-03-22
Payer: MEDICARE

## 2022-03-22 DIAGNOSIS — Z94.5 STATUS POST SKIN FLAP GRAFT: Primary | ICD-10-CM

## 2022-03-22 LAB
HCT VFR BLD AUTO: 33.5 % (ref 41.1–50.3)
HGB BLD-MCNC: 10.9 G/DL (ref 13.6–17.2)

## 2022-03-22 PROCEDURE — 2709999900 HC NON-CHARGEABLE SUPPLY: Performed by: SURGERY

## 2022-03-22 PROCEDURE — 74011250636 HC RX REV CODE- 250/636: Performed by: SURGERY

## 2022-03-22 PROCEDURE — 77030003666 HC NDL SPINAL BD -A: Performed by: SURGERY

## 2022-03-22 PROCEDURE — 74011250636 HC RX REV CODE- 250/636: Performed by: NURSE ANESTHETIST, CERTIFIED REGISTERED

## 2022-03-22 PROCEDURE — 74011000254 HC RX REV CODE- 254: Performed by: NURSE ANESTHETIST, CERTIFIED REGISTERED

## 2022-03-22 PROCEDURE — 74011250636 HC RX REV CODE- 250/636: Performed by: REGISTERED NURSE

## 2022-03-22 PROCEDURE — 74011000250 HC RX REV CODE- 250: Performed by: NURSE ANESTHETIST, CERTIFIED REGISTERED

## 2022-03-22 PROCEDURE — 77030002986 HC SUT PROL J&J -A: Performed by: SURGERY

## 2022-03-22 PROCEDURE — 36415 COLL VENOUS BLD VENIPUNCTURE: CPT

## 2022-03-22 PROCEDURE — 77030002996 HC SUT SLK J&J -A: Performed by: SURGERY

## 2022-03-22 PROCEDURE — 74011250636 HC RX REV CODE- 250/636: Performed by: ANESTHESIOLOGY

## 2022-03-22 PROCEDURE — 77030018836 HC SOL IRR NACL ICUM -A: Performed by: SURGERY

## 2022-03-22 PROCEDURE — 77030002933 HC SUT MCRYL J&J -A: Performed by: SURGERY

## 2022-03-22 PROCEDURE — 87102 FUNGUS ISOLATION CULTURE: CPT

## 2022-03-22 PROCEDURE — 2709999900 HC NON-CHARGEABLE SUPPLY

## 2022-03-22 PROCEDURE — 77030041445 HC PENCL ELECT SMK EVAC STRY -B: Performed by: SURGERY

## 2022-03-22 PROCEDURE — 77030008459 HC STPLR SKN COOP -B: Performed by: SURGERY

## 2022-03-22 PROCEDURE — 77030039425 HC BLD LARYNG TRULITE DISP TELE -A: Performed by: ANESTHESIOLOGY

## 2022-03-22 PROCEDURE — 77030040830 HC CATH URETH FOL MDII -A: Performed by: SURGERY

## 2022-03-22 PROCEDURE — 77030037088 HC TUBE ENDOTRACH ORAL NSL COVD-A: Performed by: ANESTHESIOLOGY

## 2022-03-22 PROCEDURE — G0378 HOSPITAL OBSERVATION PER HR: HCPCS

## 2022-03-22 PROCEDURE — 77030006627 HC BLD GRFT DRMTO ZIMM -B: Performed by: SURGERY

## 2022-03-22 PROCEDURE — 77030011283 HC ELECTRD NDL COVD -A: Performed by: SURGERY

## 2022-03-22 PROCEDURE — 77030031139 HC SUT VCRL2 J&J -A: Performed by: SURGERY

## 2022-03-22 PROCEDURE — 77030013629 HC ELECTRD NDL STRY -B: Performed by: SURGERY

## 2022-03-22 PROCEDURE — 85018 HEMOGLOBIN: CPT

## 2022-03-22 PROCEDURE — 76210000006 HC OR PH I REC 0.5 TO 1 HR: Performed by: SURGERY

## 2022-03-22 PROCEDURE — 74011000250 HC RX REV CODE- 250: Performed by: SURGERY

## 2022-03-22 PROCEDURE — 77030040922 HC BLNKT HYPOTHRM STRY -A: Performed by: ANESTHESIOLOGY

## 2022-03-22 PROCEDURE — 87116 MYCOBACTERIA CULTURE: CPT

## 2022-03-22 PROCEDURE — 74011000250 HC RX REV CODE- 250: Performed by: ANESTHESIOLOGY

## 2022-03-22 PROCEDURE — 77030008462 HC STPLR SKN PROX J&J -A: Performed by: SURGERY

## 2022-03-22 PROCEDURE — 76060000041 HC ANESTHESIA 5 TO 5.5 HR: Performed by: SURGERY

## 2022-03-22 PROCEDURE — 74011250637 HC RX REV CODE- 250/637: Performed by: ANESTHESIOLOGY

## 2022-03-22 PROCEDURE — 77030019908 HC STETH ESOPH SIMS -A: Performed by: ANESTHESIOLOGY

## 2022-03-22 PROCEDURE — 76010000177 HC OR TIME 5 TO 5.5 HR INTENSV-TIER 1: Performed by: SURGERY

## 2022-03-22 PROCEDURE — 77030040361 HC SLV COMPR DVT MDII -B: Performed by: SURGERY

## 2022-03-22 PROCEDURE — 77030013708 HC HNDPC SUC IRR PULS STRY –B: Performed by: SURGERY

## 2022-03-22 PROCEDURE — 77030010512 HC APPL CLP LIG J&J -C: Performed by: SURGERY

## 2022-03-22 PROCEDURE — 77030002916 HC SUT ETHLN J&J -A: Performed by: SURGERY

## 2022-03-22 PROCEDURE — 77030040506 HC DRN WND MDII -A: Performed by: SURGERY

## 2022-03-22 RX ORDER — FENTANYL CITRATE 50 UG/ML
100 INJECTION, SOLUTION INTRAMUSCULAR; INTRAVENOUS ONCE
Status: DISCONTINUED | OUTPATIENT
Start: 2022-03-22 | End: 2022-03-22 | Stop reason: HOSPADM

## 2022-03-22 RX ORDER — DEXAMETHASONE SODIUM PHOSPHATE 4 MG/ML
INJECTION, SOLUTION INTRA-ARTICULAR; INTRALESIONAL; INTRAMUSCULAR; INTRAVENOUS; SOFT TISSUE AS NEEDED
Status: DISCONTINUED | OUTPATIENT
Start: 2022-03-22 | End: 2022-03-22 | Stop reason: HOSPADM

## 2022-03-22 RX ORDER — CEFAZOLIN SODIUM/WATER 2 G/20 ML
2 SYRINGE (ML) INTRAVENOUS ONCE
Status: COMPLETED | OUTPATIENT
Start: 2022-03-22 | End: 2022-03-22

## 2022-03-22 RX ORDER — OXYCODONE HYDROCHLORIDE 5 MG/1
5 TABLET ORAL
Status: DISCONTINUED | OUTPATIENT
Start: 2022-03-22 | End: 2022-03-22

## 2022-03-22 RX ORDER — HYDROCODONE BITARTRATE AND ACETAMINOPHEN 5; 325 MG/1; MG/1
1 TABLET ORAL
Status: DISCONTINUED | OUTPATIENT
Start: 2022-03-22 | End: 2022-03-23 | Stop reason: HOSPADM

## 2022-03-22 RX ORDER — GLYCOPYRROLATE 0.2 MG/ML
INJECTION INTRAMUSCULAR; INTRAVENOUS AS NEEDED
Status: DISCONTINUED | OUTPATIENT
Start: 2022-03-22 | End: 2022-03-22 | Stop reason: HOSPADM

## 2022-03-22 RX ORDER — SODIUM CHLORIDE 9 MG/ML
INJECTION INTRAMUSCULAR; INTRAVENOUS; SUBCUTANEOUS AS NEEDED
Status: DISCONTINUED | OUTPATIENT
Start: 2022-03-22 | End: 2022-03-22 | Stop reason: HOSPADM

## 2022-03-22 RX ORDER — NALOXONE HYDROCHLORIDE 0.4 MG/ML
0.2 INJECTION, SOLUTION INTRAMUSCULAR; INTRAVENOUS; SUBCUTANEOUS AS NEEDED
Status: DISCONTINUED | OUTPATIENT
Start: 2022-03-22 | End: 2022-03-23 | Stop reason: HOSPADM

## 2022-03-22 RX ORDER — CEFAZOLIN SODIUM/WATER 2 G/20 ML
SYRINGE (ML) INTRAVENOUS AS NEEDED
Status: DISCONTINUED | OUTPATIENT
Start: 2022-03-22 | End: 2022-03-22 | Stop reason: HOSPADM

## 2022-03-22 RX ORDER — PROPOFOL 10 MG/ML
INJECTION, EMULSION INTRAVENOUS AS NEEDED
Status: DISCONTINUED | OUTPATIENT
Start: 2022-03-22 | End: 2022-03-22 | Stop reason: HOSPADM

## 2022-03-22 RX ORDER — EPHEDRINE SULFATE/0.9% NACL/PF 50 MG/5 ML
SYRINGE (ML) INTRAVENOUS AS NEEDED
Status: DISCONTINUED | OUTPATIENT
Start: 2022-03-22 | End: 2022-03-22 | Stop reason: HOSPADM

## 2022-03-22 RX ORDER — EPINEPHRINE 1 MG/ML
INJECTION INTRAMUSCULAR; INTRAVENOUS; SUBCUTANEOUS AS NEEDED
Status: DISCONTINUED | OUTPATIENT
Start: 2022-03-22 | End: 2022-03-22 | Stop reason: HOSPADM

## 2022-03-22 RX ORDER — OXYCODONE HYDROCHLORIDE 5 MG/1
10 TABLET ORAL
Status: DISCONTINUED | OUTPATIENT
Start: 2022-03-22 | End: 2022-03-22

## 2022-03-22 RX ORDER — NEOSTIGMINE METHYLSULFATE 1 MG/ML
INJECTION, SOLUTION INTRAVENOUS AS NEEDED
Status: DISCONTINUED | OUTPATIENT
Start: 2022-03-22 | End: 2022-03-22 | Stop reason: HOSPADM

## 2022-03-22 RX ORDER — SODIUM CHLORIDE 0.9 % (FLUSH) 0.9 %
5-40 SYRINGE (ML) INJECTION EVERY 8 HOURS
Status: DISCONTINUED | OUTPATIENT
Start: 2022-03-22 | End: 2022-03-23 | Stop reason: HOSPADM

## 2022-03-22 RX ORDER — INDOCYANINE GREEN AND WATER 25 MG
KIT INJECTION AS NEEDED
Status: DISCONTINUED | OUTPATIENT
Start: 2022-03-22 | End: 2022-03-22 | Stop reason: HOSPADM

## 2022-03-22 RX ORDER — LIDOCAINE HYDROCHLORIDE 10 MG/ML
0.1 INJECTION INFILTRATION; PERINEURAL AS NEEDED
Status: DISCONTINUED | OUTPATIENT
Start: 2022-03-22 | End: 2022-03-22 | Stop reason: HOSPADM

## 2022-03-22 RX ORDER — ACETAMINOPHEN 500 MG
1000 TABLET ORAL ONCE
Status: COMPLETED | OUTPATIENT
Start: 2022-03-22 | End: 2022-03-22

## 2022-03-22 RX ORDER — ONDANSETRON 2 MG/ML
INJECTION INTRAMUSCULAR; INTRAVENOUS AS NEEDED
Status: DISCONTINUED | OUTPATIENT
Start: 2022-03-22 | End: 2022-03-22 | Stop reason: HOSPADM

## 2022-03-22 RX ORDER — SODIUM CHLORIDE, SODIUM LACTATE, POTASSIUM CHLORIDE, CALCIUM CHLORIDE 600; 310; 30; 20 MG/100ML; MG/100ML; MG/100ML; MG/100ML
100 INJECTION, SOLUTION INTRAVENOUS CONTINUOUS
Status: DISCONTINUED | OUTPATIENT
Start: 2022-03-22 | End: 2022-03-22 | Stop reason: HOSPADM

## 2022-03-22 RX ORDER — SODIUM CHLORIDE 0.9 % (FLUSH) 0.9 %
5-40 SYRINGE (ML) INJECTION EVERY 8 HOURS
Status: DISCONTINUED | OUTPATIENT
Start: 2022-03-22 | End: 2022-03-22 | Stop reason: HOSPADM

## 2022-03-22 RX ORDER — SODIUM CHLORIDE 0.9 % (FLUSH) 0.9 %
5-40 SYRINGE (ML) INJECTION AS NEEDED
Status: DISCONTINUED | OUTPATIENT
Start: 2022-03-22 | End: 2022-03-22 | Stop reason: HOSPADM

## 2022-03-22 RX ORDER — FLUMAZENIL 0.1 MG/ML
0.2 INJECTION INTRAVENOUS
Status: DISCONTINUED | OUTPATIENT
Start: 2022-03-22 | End: 2022-03-23 | Stop reason: HOSPADM

## 2022-03-22 RX ORDER — MORPHINE SULFATE 2 MG/ML
2 INJECTION, SOLUTION INTRAMUSCULAR; INTRAVENOUS
Status: DISCONTINUED | OUTPATIENT
Start: 2022-03-22 | End: 2022-03-23 | Stop reason: HOSPADM

## 2022-03-22 RX ORDER — LIDOCAINE HYDROCHLORIDE 20 MG/ML
INJECTION, SOLUTION EPIDURAL; INFILTRATION; INTRACAUDAL; PERINEURAL AS NEEDED
Status: DISCONTINUED | OUTPATIENT
Start: 2022-03-22 | End: 2022-03-22 | Stop reason: HOSPADM

## 2022-03-22 RX ORDER — SODIUM CHLORIDE 0.9 % (FLUSH) 0.9 %
5-40 SYRINGE (ML) INJECTION AS NEEDED
Status: DISCONTINUED | OUTPATIENT
Start: 2022-03-22 | End: 2022-03-23 | Stop reason: HOSPADM

## 2022-03-22 RX ORDER — HYDROMORPHONE HYDROCHLORIDE 2 MG/ML
0.5 INJECTION, SOLUTION INTRAMUSCULAR; INTRAVENOUS; SUBCUTANEOUS
Status: DISCONTINUED | OUTPATIENT
Start: 2022-03-22 | End: 2022-03-22

## 2022-03-22 RX ORDER — LIDOCAINE HYDROCHLORIDE AND EPINEPHRINE 10; 10 MG/ML; UG/ML
INJECTION, SOLUTION INFILTRATION; PERINEURAL AS NEEDED
Status: DISCONTINUED | OUTPATIENT
Start: 2022-03-22 | End: 2022-03-22 | Stop reason: HOSPADM

## 2022-03-22 RX ORDER — FENTANYL CITRATE 50 UG/ML
INJECTION, SOLUTION INTRAMUSCULAR; INTRAVENOUS AS NEEDED
Status: DISCONTINUED | OUTPATIENT
Start: 2022-03-22 | End: 2022-03-22 | Stop reason: HOSPADM

## 2022-03-22 RX ORDER — DIPHENHYDRAMINE HYDROCHLORIDE 50 MG/ML
12.5 INJECTION, SOLUTION INTRAMUSCULAR; INTRAVENOUS
Status: DISCONTINUED | OUTPATIENT
Start: 2022-03-22 | End: 2022-03-23 | Stop reason: HOSPADM

## 2022-03-22 RX ORDER — HYDROMORPHONE HYDROCHLORIDE 2 MG/ML
INJECTION, SOLUTION INTRAMUSCULAR; INTRAVENOUS; SUBCUTANEOUS AS NEEDED
Status: DISCONTINUED | OUTPATIENT
Start: 2022-03-22 | End: 2022-03-22 | Stop reason: HOSPADM

## 2022-03-22 RX ORDER — SODIUM CHLORIDE, SODIUM LACTATE, POTASSIUM CHLORIDE, CALCIUM CHLORIDE 600; 310; 30; 20 MG/100ML; MG/100ML; MG/100ML; MG/100ML
100 INJECTION, SOLUTION INTRAVENOUS CONTINUOUS
Status: DISCONTINUED | OUTPATIENT
Start: 2022-03-22 | End: 2022-03-22

## 2022-03-22 RX ORDER — ROCURONIUM BROMIDE 10 MG/ML
INJECTION, SOLUTION INTRAVENOUS AS NEEDED
Status: DISCONTINUED | OUTPATIENT
Start: 2022-03-22 | End: 2022-03-22 | Stop reason: HOSPADM

## 2022-03-22 RX ADMIN — Medication 10 MG: at 10:39

## 2022-03-22 RX ADMIN — PHENYLEPHRINE HYDROCHLORIDE 100 MCG: 10 INJECTION INTRAVENOUS at 14:32

## 2022-03-22 RX ADMIN — SODIUM CHLORIDE, PRESERVATIVE FREE 10 ML: 5 INJECTION INTRAVENOUS at 23:17

## 2022-03-22 RX ADMIN — Medication 2 G: at 10:13

## 2022-03-22 RX ADMIN — DEXAMETHASONE SODIUM PHOSPHATE 4 MG: 4 INJECTION, SOLUTION INTRAMUSCULAR; INTRAVENOUS at 11:02

## 2022-03-22 RX ADMIN — PHENYLEPHRINE HYDROCHLORIDE 100 MCG: 10 INJECTION INTRAVENOUS at 11:19

## 2022-03-22 RX ADMIN — PHENYLEPHRINE HYDROCHLORIDE 100 MCG: 10 INJECTION INTRAVENOUS at 12:15

## 2022-03-22 RX ADMIN — Medication 10 MG: at 10:49

## 2022-03-22 RX ADMIN — SODIUM CHLORIDE, PRESERVATIVE FREE 10 ML: 5 INJECTION INTRAVENOUS at 17:25

## 2022-03-22 RX ADMIN — PHENYLEPHRINE HYDROCHLORIDE 100 MCG: 10 INJECTION INTRAVENOUS at 10:15

## 2022-03-22 RX ADMIN — PHENYLEPHRINE HYDROCHLORIDE 100 MCG: 10 INJECTION INTRAVENOUS at 11:33

## 2022-03-22 RX ADMIN — LIDOCAINE HYDROCHLORIDE 60 MG: 20 INJECTION, SOLUTION EPIDURAL; INFILTRATION; INTRACAUDAL; PERINEURAL at 10:04

## 2022-03-22 RX ADMIN — HYDROMORPHONE HYDROCHLORIDE 0.4 MG: 2 INJECTION INTRAMUSCULAR; INTRAVENOUS; SUBCUTANEOUS at 15:02

## 2022-03-22 RX ADMIN — Medication 10 MG: at 10:36

## 2022-03-22 RX ADMIN — PHENYLEPHRINE HYDROCHLORIDE 100 MCG: 10 INJECTION INTRAVENOUS at 13:35

## 2022-03-22 RX ADMIN — GLYCOPYRROLATE 0.4 MG: 0.2 INJECTION, SOLUTION INTRAMUSCULAR; INTRAVENOUS at 14:54

## 2022-03-22 RX ADMIN — ACETAMINOPHEN 1000 MG: 500 TABLET, FILM COATED ORAL at 17:17

## 2022-03-22 RX ADMIN — ROCURONIUM BROMIDE 10 MG: 50 INJECTION, SOLUTION INTRAVENOUS at 11:25

## 2022-03-22 RX ADMIN — PHENYLEPHRINE HYDROCHLORIDE 100 MCG: 10 INJECTION INTRAVENOUS at 14:19

## 2022-03-22 RX ADMIN — ONDANSETRON 4 MG: 2 INJECTION INTRAMUSCULAR; INTRAVENOUS at 14:41

## 2022-03-22 RX ADMIN — PROPOFOL 150 MG: 10 INJECTION, EMULSION INTRAVENOUS at 10:04

## 2022-03-22 RX ADMIN — PHENYLEPHRINE HYDROCHLORIDE 50 MCG: 10 INJECTION INTRAVENOUS at 11:01

## 2022-03-22 RX ADMIN — SODIUM CHLORIDE, SODIUM LACTATE, POTASSIUM CHLORIDE, AND CALCIUM CHLORIDE: 600; 310; 30; 20 INJECTION, SOLUTION INTRAVENOUS at 12:08

## 2022-03-22 RX ADMIN — PHENYLEPHRINE HYDROCHLORIDE 100 MCG: 10 INJECTION INTRAVENOUS at 14:37

## 2022-03-22 RX ADMIN — SODIUM CHLORIDE, SODIUM LACTATE, POTASSIUM CHLORIDE, AND CALCIUM CHLORIDE 100 ML/HR: 600; 310; 30; 20 INJECTION, SOLUTION INTRAVENOUS at 07:30

## 2022-03-22 RX ADMIN — ROCURONIUM BROMIDE 10 MG: 50 INJECTION, SOLUTION INTRAVENOUS at 11:52

## 2022-03-22 RX ADMIN — PHENYLEPHRINE HYDROCHLORIDE 100 MCG: 10 INJECTION INTRAVENOUS at 13:57

## 2022-03-22 RX ADMIN — Medication 10 MG: at 11:01

## 2022-03-22 RX ADMIN — Medication 1 G: at 11:20

## 2022-03-22 RX ADMIN — ROCURONIUM BROMIDE 10 MG: 50 INJECTION, SOLUTION INTRAVENOUS at 14:20

## 2022-03-22 RX ADMIN — FENTANYL CITRATE 50 MCG: 50 INJECTION INTRAMUSCULAR; INTRAVENOUS at 10:27

## 2022-03-22 RX ADMIN — FENTANYL CITRATE 50 MCG: 50 INJECTION INTRAMUSCULAR; INTRAVENOUS at 10:03

## 2022-03-22 RX ADMIN — PHENYLEPHRINE HYDROCHLORIDE 50 MCG: 10 INJECTION INTRAVENOUS at 10:49

## 2022-03-22 RX ADMIN — INDOCYANINE GREEN AND WATER 3.12 MG: KIT at 14:13

## 2022-03-22 RX ADMIN — ROCURONIUM BROMIDE 10 MG: 50 INJECTION, SOLUTION INTRAVENOUS at 12:50

## 2022-03-22 RX ADMIN — PHENYLEPHRINE HYDROCHLORIDE 100 MCG: 10 INJECTION INTRAVENOUS at 10:39

## 2022-03-22 RX ADMIN — Medication 10 MG: at 10:15

## 2022-03-22 RX ADMIN — ROCURONIUM BROMIDE 10 MG: 50 INJECTION, SOLUTION INTRAVENOUS at 13:35

## 2022-03-22 RX ADMIN — ROCURONIUM BROMIDE 40 MG: 50 INJECTION, SOLUTION INTRAVENOUS at 10:04

## 2022-03-22 RX ADMIN — Medication 3 MG: at 14:54

## 2022-03-22 RX ADMIN — PHENYLEPHRINE HYDROCHLORIDE 100 MCG: 10 INJECTION INTRAVENOUS at 10:36

## 2022-03-22 NOTE — PROGRESS NOTES
TRANSFER - IN REPORT:    Verbal report received from [id number] RN(name) on Sury Garcia  being received from PACU  (unit) for routine progression of care      Report consisted of patients Situation, Background, Assessment and   Recommendations(SBAR). Information from the following report(s) SBAR, Kardex and Procedure Summary was reviewed with the receiving nurse. Opportunity for questions and clarification was provided. Assessment completed upon patients arrival to unit and care assumed.

## 2022-03-22 NOTE — H&P
CC: R jaw wound   HPI: 81 yo with complex history of oropharyngeal cancer eventually requiring free fibula flap reconstruction of the R mandible. Now with exposed hardware at the mandible. Past Medical History:   Diagnosis Date    Cancer [facility])     throat, 2012    Chronic obstructive pulmonary disease ([facility] [address]     Hypercholesterolemia     Stroke ([facility] [address]     6/2019, tube feed, aspiration R weakness,      Past Surgical History:   Procedure Laterality Date    HX COLONOSCOPY      HX ORTHOPAEDIC Right 2019    hip replacement    HX ORTHOPAEDIC Right 01/2019    jaw frx    HX ORTHOPAEDIC Right 07/2020    fracture repair of femur/hip     A&O x3  RRR  Resp clear  R mandible with exposed reconstruction plate. A/P  Will proceed with right deltopectoral flap reconstruction and possible skin graft from right thigh. Risks, benefits and alternatives discussed. Consent affirmed.

## 2022-03-22 NOTE — ANESTHESIA POSTPROCEDURE EVALUATION
Procedure(s):  DELTOPECTORAL FLAP TO RIGHT MANDIBLE WOUND.    general    Anesthesia Post Evaluation      Multimodal analgesia: multimodal analgesia used between 6 hours prior to anesthesia start to PACU discharge  Patient location during evaluation: PACU  Patient participation: complete - patient participated  Level of consciousness: awake and alert  Pain management: adequate  Airway patency: patent  Anesthetic complications: no  Cardiovascular status: acceptable and hemodynamically stable  Respiratory status: acceptable  Hydration status: acceptable  Post anesthesia nausea and vomiting:  controlled  Final Post Anesthesia Temperature Assessment:  Normothermia (36.0-37.5 degrees C)      INITIAL Post-op Vital signs:   Vitals Value Taken Time   /72 03/22/22 1600   Temp 36.4 °C (97.6 °F) 03/22/22 1515   Pulse 69 03/22/22 1601   Resp 16 03/22/22 1542   SpO2 96 % 03/22/22 1601   Vitals shown include unvalidated device data.

## 2022-03-22 NOTE — PROGRESS NOTES
03/22/22 1700   Dual Skin Pressure Injury Assessment   Dual Skin Pressure Injury Assessment WDL   Second Care Provider (Based on [address] [place] [address]) [facility] [place] [facility]

## 2022-03-22 NOTE — PROGRESS NOTES
END OF SHIFT NOTE:    INTAKE/OUTPUT  No intake/output data recorded. Voiding: NO  Catheter: YES  Drain:   Maurizio-Thompson Drain 03/22/22 Right;Mid Chest (Active)   Dressing Status Clean, dry, & intact 03/22/22 1700   Status Draining 03/22/22 1700   Drainage Color Serosanguinous 03/22/22 1700   Output (ml) 20 ml 03/22/22 1738       Maurizio-Thompson Drain 03/22/22 Right; Outer Chest (Active)   Dressing Status Clean, dry, & intact 03/22/22 1700   Status Draining 03/22/22 1700   Drainage Color Serosanguinous 03/22/22 1700   Output (ml) 35 ml 03/22/22 1738       PEG/Gastrostomy Tube (Active)   Site Assessment Clean, dry, & intact 03/22/22 1734   Dressing Status Clean, dry, & intact 03/22/22 1734   G Port Status Clamped 03/22/22 1734               Flatus: Patient does not have flatus present. Stool:  0 occurrences. Characteristics:       Emesis: 0 occurrences. Characteristics:        VITAL SIGNS  Patient Vitals for the past 12 hrs:   Temp Pulse Resp BP SpO2   03/22/22 1640 97.5 °F (36.4 °C) 87 17 (!) 158/75 90 %   03/22/22 1542 -- 78 16 (!) 155/71 96 %   03/22/22 1541 -- 80 -- (!) 155/71 95 %   03/22/22 1540 -- 81 -- -- 95 %   03/22/22 1539 -- 83 -- -- 94 %   03/22/22 1538 -- 79 -- -- 94 %   03/22/22 1537 -- 77 16 (!) 152/74 93 %   03/22/22 1532 -- 79 16 (!) 146/73 93 %   03/22/22 1527 -- 78 -- (!) 154/77 99 %   03/22/22 1522 -- 81 16 (!) 154/76 100 %   03/22/22 1517 -- 82 -- -- 100 %   03/22/22 1515 97.6 °F (36.4 °C) 89 18 (!) 163/74 100 %   03/22/22 1512 97.6 °F (36.4 °C) 89 15 (!) 163/74 100 %       Pain Assessment  Pain Intensity 1: 5 (03/22/22 2539)        Patient Stated Pain Goal: 3    Ambulating  No    Shift report given to oncoming nurse at the bedside.     Bina Eduardo

## 2022-03-22 NOTE — ANESTHESIA PREPROCEDURE EVALUATION
Relevant Problems   RESPIRATORY SYSTEM   (+) Chronic obstructive pulmonary disease (HCC)       Anesthetic History               Review of Systems / Medical History  Patient summary reviewed and pertinent labs reviewed    Pulmonary    COPD: mild               Neuro/Psych       CVA (RUE hemiparesis)      Comments: Expressive aphasia  Cardiovascular                  Exercise tolerance: <4 METS: D/t RLE weakness     GI/Hepatic/Renal  Within defined limits             Comments: PEG tube (from CVA)  Endo/Other        Cancer (oropharyngeal s/p radiation)     Other Findings   Comments: Patient only able to drink small sips of thin liquids         Physical Exam    Airway  Mallampati: II  TM Distance: 4 - 6 cm  Neck ROM: normal range of motion   Mouth opening: Normal     Cardiovascular    Rhythm: regular  Rate: normal         Dental    Dentition: Edentulous     Pulmonary  Breath sounds clear to auscultation               Abdominal         Other Findings            Anesthetic Plan    ASA: 4  Anesthesia type: general          Induction: Intravenous  Anesthetic plan and risks discussed with: Patient and Spouse      H/O neck radiation but patient has wide mouth opening and is edentulous.

## 2022-03-23 VITALS
SYSTOLIC BLOOD PRESSURE: 111 MMHG | TEMPERATURE: 98.4 F | RESPIRATION RATE: 20 BRPM | HEART RATE: 98 BPM | BODY MASS INDEX: 22.12 KG/M2 | WEIGHT: 158 LBS | OXYGEN SATURATION: 92 % | HEIGHT: 71 IN | DIASTOLIC BLOOD PRESSURE: 63 MMHG

## 2022-03-23 LAB
ANION GAP SERPL CALC-SCNC: 4 MMOL/L (ref 7–16)
BUN SERPL-MCNC: 16 MG/DL (ref 8–23)
CALCIUM SERPL-MCNC: 9.2 MG/DL (ref 8.3–10.4)
CHLORIDE SERPL-SCNC: 109 MMOL/L (ref 98–107)
CO2 SERPL-SCNC: 29 MMOL/L (ref 21–32)
CREAT SERPL-MCNC: 0.4 MG/DL (ref 0.8–1.5)
GLUCOSE SERPL-MCNC: 94 MG/DL (ref 65–100)
HCT VFR BLD AUTO: 33.1 % (ref 41.1–50.3)
HGB BLD-MCNC: 10.7 G/DL (ref 13.6–17.2)
MAGNESIUM SERPL-MCNC: 2.2 MG/DL (ref 1.8–2.4)
PHOSPHATE SERPL-MCNC: 2.2 MG/DL (ref 2.3–3.7)
POTASSIUM SERPL-SCNC: 3.5 MMOL/L (ref 3.5–5.1)
SODIUM SERPL-SCNC: 142 MMOL/L (ref 138–145)

## 2022-03-23 PROCEDURE — 2709999900 HC NON-CHARGEABLE SUPPLY

## 2022-03-23 PROCEDURE — G0378 HOSPITAL OBSERVATION PER HR: HCPCS

## 2022-03-23 PROCEDURE — 74011250637 HC RX REV CODE- 250/637: Performed by: SURGERY

## 2022-03-23 PROCEDURE — 74011000250 HC RX REV CODE- 250: Performed by: ANESTHESIOLOGY

## 2022-03-23 PROCEDURE — 85018 HEMOGLOBIN: CPT

## 2022-03-23 PROCEDURE — 80048 BASIC METABOLIC PNL TOTAL CA: CPT

## 2022-03-23 PROCEDURE — 83735 ASSAY OF MAGNESIUM: CPT

## 2022-03-23 PROCEDURE — 84100 ASSAY OF PHOSPHORUS: CPT

## 2022-03-23 PROCEDURE — 36415 COLL VENOUS BLD VENIPUNCTURE: CPT

## 2022-03-23 RX ORDER — CEPHALEXIN 500 MG/1
500 CAPSULE ORAL 4 TIMES DAILY
Qty: 28 CAPSULE | Refills: 0 | Status: SHIPPED | OUTPATIENT
Start: 2022-03-23

## 2022-03-23 RX ORDER — GABAPENTIN 300 MG/1
300 CAPSULE ORAL 3 TIMES DAILY
Qty: 30 CAPSULE | Refills: 0 | Status: SHIPPED | OUTPATIENT
Start: 2022-03-23

## 2022-03-23 RX ORDER — DIAZEPAM 2 MG/1
2 TABLET ORAL
Qty: 30 TABLET | Refills: 0 | Status: SHIPPED | OUTPATIENT
Start: 2022-03-23

## 2022-03-23 RX ORDER — TRAMADOL HYDROCHLORIDE 50 MG/1
50 TABLET ORAL
Qty: 30 TABLET | Refills: 0 | Status: SHIPPED | OUTPATIENT
Start: 2022-03-23 | End: 2022-03-26

## 2022-03-23 RX ADMIN — SODIUM CHLORIDE, PRESERVATIVE FREE 10 ML: 5 INJECTION INTRAVENOUS at 05:57

## 2022-03-23 RX ADMIN — HYDROCODONE BITARTRATE AND ACETAMINOPHEN 1 TABLET: 5; 325 TABLET ORAL at 08:46

## 2022-03-23 NOTE — DISCHARGE INSTRUCTIONS
Skin Grafts: What to Expect at Home  Your Recovery  Skin grafts are thin sheets of healthy skin removed from one part of the body (donor site) and put on another part. Grafts can be used to treat skin damaged by burns, infection, or other injury. If possible, the doctor takes healthy skin from areas that are usually covered by clothes or are not easily seen. You will have a bandage over the skin graft. The area may be sore for 1 to 2 weeks. Keep the area of the skin graft dry while it heals, unless your doctor gives you other instructions. If possible, prop up the area of your body that has the skin graft. Keeping it raised will reduce swelling and fluid buildup that can cause problems with the graft. You also will have a bandage on the donor site. Try to avoid getting sunlight on the skin graft for several months. This helps to prevent a permanent change of color in the grafted skin. And for at least 3 weeks after surgery, avoid exercise that stretches the skin graft, unless your doctor gives you other instructions. If the graft was placed on your legs, arms, hands, or feet, you may need physical therapy to prevent scar tissue from limiting your movement. This therapy is very important. It may involve wearing splints and doing stretches and range-of-motion exercises. These may be painful, but they help you to heal properly. It may take months for you to regain some feeling in the grafted area. The feeling will be different than it was before your injury. You may not have sweat glands in the skin graft area. If the grafted area is large, this may make it hard for the area to cool off when you are hot. The grafted area may not have oil glands. This can make the skin graft dry and flaky. After your graft heals, you may need to use lotion to keep the skin moist. The skin graft may not grow hair. Sometimes skin grafts do not \"take\" or survive after being transferred.  If the skin graft doesn't work, you may need another graft. This care sheet gives you a general idea about how long it will take for you to recover. But each person recovers at a different rate, and certain areas of the body take longer to heal than others. Follow the steps below to feel better as quickly as possible. How can you care for yourself at home? Activity    · Rest when you feel tired. Getting enough sleep will help you recover.     · Try to walk each day, unless the grafted area is on your foot or leg. Start by walking a little more than you did the day before. Bit by bit, increase the amount you walk. Walking boosts blood flow to the skin grafts.     · Ask your doctor when you can drive again.     · Your doctor will tell you when you can return to work. It depends on the size of the skin graft, what part of your body was grafted, the type of work you do, and how you feel.     · Your doctor will tell you when you can take a shower. Do not take a bath for the first 2 weeks, or until your doctor tells you it is okay.     · Avoid strenuous activities, such as bicycle riding, jogging, weight lifting, or aerobic exercise, until your doctor says it is okay. Diet    · You can eat your normal diet. If your stomach is upset, try bland, low-fat foods like plain rice, broiled chicken, toast, and yogurt.     · Drink plenty of fluids (unless your doctor tells you not to).     · You may notice that your bowel movements are not regular right after your surgery. This is common. Try to avoid constipation and straining with bowel movements. You may want to take a fiber supplement every day. If you have not had a bowel movement after a couple of days, ask your doctor about taking a mild laxative. Medicines    · Your doctor will tell you if and when you can restart your medicines. He or she will also give you instructions about taking any new medicines.     · If you take aspirin or some other blood thinner, ask your doctor if and when to start taking it again. Make sure that you understand exactly what your doctor wants you to do.     · Be safe with medicines. Take pain medicines exactly as directed. ? If the doctor gave you a prescription medicine for pain, take it as prescribed. ? If you are not taking a prescription pain medicine, ask your doctor if you can take an over-the-counter medicine.     · If your doctor prescribed antibiotics, take them as directed. Do not stop taking them just because you feel better. You need to take the full course of antibiotics.     · If you think your pain medicine is making you sick to your stomach:  ? Take your medicine after meals (unless your doctor has told you not to). ? Ask your doctor for a different pain medicine. Skin graft and donor site care    · Leave the bandages on the skin graft and donor site until your doctor says you can take them off. You probably will receive instructions on how to change the bandages. Follow these instructions closely.     · Keep the area clean and dry, unless your doctor tells you differently.     · Do not rub the skin graft for 3 to 4 weeks. Follow-up care is a key part of your treatment and safety. Be sure to make and go to all appointments, and call your doctor if you are having problems. It's also a good idea to know your test results and keep a list of the medicines you take. When should you call for help? Call 911 anytime you think you may need emergency care. For example, call if:    · You passed out (lost consciousness).     · You have severe trouble breathing.     · You have sudden chest pain and shortness of breath, or you cough up blood.    Call your doctor now or seek immediate medical care if:    · You have pain that does not get better after you take pain medicine.     · You have loose stitches, or your skin graft comes loose.     · You have bleeding from the skin graft.     · You have symptoms of a blood clot in your leg (called a deep vein thrombosis), such as:  ? Pain in the calf, back of the knee, thigh, or groin. ? Redness and swelling in your leg or groin.     · You have signs of infection, such as:  ? Increased pain, swelling, warmth, or redness. ? Red streaks leading from the incision. ? Pus draining from the incision. ? A fever.     · You are sick to your stomach or cannot keep fluids down. Watch closely for changes in your health, and be sure to contact your doctor if:    · You do not get better as expected. Where can you learn more? Go to http://www.gray.com/  Enter V389 in the search box to learn more about \"Skin Grafts: What to Expect at Home. \"  Current as of: November 15, 2021               Content Version: 13.2  © 2006-2022 [employer]. Care instructions adapted under license by [employer] (which disclaims liability or warranty for this information). If you have questions about a medical condition or this instruction, always ask your healthcare professional. [place] [address] any warranty or liability for your use of this information. Surgical Drain Care: Care Instructions  Your Care Instructions     After a surgery, fluid may collect inside your body in the surgical area. This makes an infection or other problems more likely. A surgical drain allows the fluid to flow out. The doctor puts a thin, flexible rubber tube into the area of your body where the fluid is likely to collect. The rubber tube carries the fluid outside your body. The most common type of surgical drain carries the fluid into a collection bulb that you empty. This is called a Maurizio-Thompson (ELAINE) drain. The drain uses suction created by the bulb to pull the fluid from your body into the bulb. The rubber tube will probably be held in place by one or two stitches in your skin.  The bulb will probably be attached with a safety pin to your clothes or near the bandage so that it doesn't flip around or pull on the stitches. Another type of drain is called a Penrose drain. This type of drain doesn't have a bulb. Instead, the end of the tube is open. That allows the fluid to drain onto a dressing taped to your skin. The drain may be kept in place next to your skin with a stitch or a safety pin in the tube. When you first get the drain, the fluid will be bloody. It will change color from red to pink to a light yellow or clear as the wound heals and the fluid starts to go away. Your doctor may give you information on when you no longer need the drain and when it will be removed. Follow-up care is a key part of your treatment and safety. Be sure to make and go to all appointments, and call your doctor if you are having problems. It's also a good idea to know your test results and keep a list of the medicines you take. How do you empty the bulb of a Maurizio-Thompson drain? Follow any instructions your doctor gives you. How often you empty the bulb depends on how much fluid is draining. Empty the bulb when it is half full. 1. Wash your hands with soap and water. 2. Take the plug out of the bulb. 3. Empty the bulb. If your doctor asks you to measure the fluid, empty the fluid into a measuring cup, and write down the color and how much you collected. Your doctor will want to know this information. 4. Clean the plug with alcohol. 5. Squeeze the bulb until it is flat. This removes all the air from the bulb. You may need to put the bulb on a table or a counter to flatten it. 6. Keep the bulb flat, and put the plug in. The bulb should stay flat after you put the plug back in. This creates the suction that pulls the fluid into the bulb. 7. Empty the fluid into the toilet. 8. Wash your hands. How do you change the dressing around your surgical drain? You may have a dressing (bandage). The dressing is often made of gauze pads held on with tape. Your doctor will tell you how often to change it.   1. Wash your hands with soap and water. 2. Take off the dressing from around the drain. 3. Clean the drain site and the skin around it with soap and water. Use gauze or a cotton swab. 4. When the site is dry, put on a new dressing. The way your dressing is put on depends on what kind of drain you have. You will get instructions for your type of drain. 5. Wash your hands again with soap and water. Your doctor may ask you to keep track of your dressing changes. Write down the time of day and the amount and color of the fluid on the dressing. How do you help prevent clogs in your surgical drain? Squeezing or \"milking\" the tube of your surgical drain can help prevent clogs so that it drains correctly. Your doctor will tell you when you need to do this. In general, you do this when:  · You see a clot in the tube that prevents fluid from draining. The clot may look like a dark, stringy lining. · You see fluid leaking around the tube where it goes into the skin. Follow these steps for milking the tube. 1. Use one hand to hold and pinch the tube where it leaves the skin. 2. With the thumb and first finger of your other hand, pinch the tube just below where you're holding it. 3. Slowly and firmly push your thumb and first finger down the tubing toward the end of the tube. 4. Repeat this as many times as needed to move the clot. If you have a Maurizio-Thompson (ELAINE) drain, the clot should move down the tube and into the bulb. If you have a Penrose drain, the clot should move into the dressing. When should you call for help? Call your doctor now or seek immediate medical care if:    · You have signs of infection, such as:  ? Increased pain, swelling, warmth, or redness around the area. ? Red streaks leading from the area. ? Pus draining from the area. ? A fever.     · You see a sudden change in the color or smell of the drainage.     · The tube is coming loose where it leaves your skin.    Watch closely for changes in your health, and be sure to contact your doctor if:    · You see a lot of fluid around the drain.     · You cannot remove a clot from the tube by milking the tube. Where can you learn more? Go to http://www.gray.com/  Enter K117 in the search box to learn more about \"Surgical Drain Care: Care Instructions. \"  Current as of: July 1, 2021               Content Version: 13.2  © 2006-2022 [employer]. Care instructions adapted under license by [employer] (which disclaims liability or warranty for this information). If you have questions about a medical condition or this instruction, always ask your healthcare professional. [place] [address] any warranty or liability for your use of this information. DISCHARGE SUMMARY from Nurse    PATIENT INSTRUCTIONS:    After general anesthesia or intravenous sedation, for 24 hours or while taking prescription Narcotics:  · Limit your activities  · Do not drive and operate hazardous machinery  · Do not make important personal or business decisions  · Do  not drink alcoholic beverages  · If you have not urinated within 8 hours after discharge, please contact your surgeon on call. Report the following to your surgeon:  · Excessive pain, swelling, redness or odor of or around the surgical area  · Temperature over 100.5  · Nausea and vomiting lasting longer than 4 hours or if unable to take medications  · Any signs of decreased circulation or nerve impairment to extremity: change in color, persistent  numbness, tingling, coldness or increase pain  · Any questions     What to do at Home:    Recommended activity: Activity as tolerated. Keep incisional area clean and dry and change dressings as needed. Empty ELAINE drains at least 4 times a day and when needed.   Record drainage    If you experience any of the following symptoms, please call Dr Lizet Rob office: Temperature of 100.5 or greater, increased pain, any signs of abnormal bleeding or large increase in ELAINE drainage output, or increased redness, swelling, or drainage from surgical site    *  Please give a list of your current medications to your Primary Care Provider. *  Please update this list whenever your medications are discontinued, doses are      changed, or new medications (including over-the-counter products) are added. *  Please carry medication information at all times in case of emergency situations. These are general instructions for a healthy lifestyle:    No smoking/ No tobacco products/ Avoid exposure to second hand smoke  Surgeon General's Warning:  Quitting smoking now greatly reduces serious risk to your health. Obesity, smoking, and sedentary lifestyle greatly increases your risk for illness    A healthy diet, regular physical exercise & weight monitoring are important for maintaining a healthy lifestyle    You may be retaining fluid if you have a history of heart failure or if you experience any of the following symptoms:  Weight gain of 3 pounds or more overnight or 5 pounds in a week, increased swelling in our hands or feet or shortness of breath while lying flat in bed. Please call your doctor as soon as you notice any of these symptoms; do not wait until your next office visit. The discharge information has been reviewed with the patient and spouse. The patient and spouse verbalized understanding. Discharge medications reviewed with the patient and spouse and appropriate educational materials and side effects teaching were provided.   ___________________________________________________________________________________________________________________________________

## 2022-03-23 NOTE — PROGRESS NOTES
Placed call to MD per wife's request.  Wanted to ensure she would be here when Dr Alcantara Rather made rounds

## 2022-03-23 NOTE — PROGRESS NOTES
Patient with discharge orders today. No supportive needs identified. Family to provide transportation home. Patient has met all treatment goals and milestones. CM following until discharged today. Care Management Interventions  PCP Verified by CM:  Yes (Dr Vishnu Echavarria  [phone])  Mode of Transport at Discharge: Self  Transition of Care Consult (CM Consult): Discharge Planning  Support Systems: Spouse/Significant Other (Spopuse: Britta Barnes  [phone])  Confirm Follow Up Transport: Self  The Plan for Transition of Care is Related to the Following Treatment Goals : return to baseline  The Patient and/or Patient Representative was Provided with a Choice of Provider and Agrees with the Discharge Plan?: Yes  Freedom of Choice List was Provided with Basic Dialogue that Supports the Patient's Individualized Plan of Care/Goals, Treatment Preferences and Shares the Quality Data Associated with the Providers?: Yes  Discharge Location  Patient Expects to be Discharged to[de-identified] Home with family assistance

## 2022-03-23 NOTE — PROGRESS NOTES
Pt's D/C instructions completed. Verbalized understanding of all instructions including diet, activity, s/sx to alert MD, medications, wound care, and f/u appointment. Wife who is a nurse is at [employer].

## 2022-03-23 NOTE — BRIEF OP NOTE
Brief Postoperative Note    Patient: Marifer Kirkland  YOB: 1950  MRN: [id number]    Date of Procedure: 3/22/2022     Pre-Op Diagnosis: Dehiscence of operative wound, initial encounter Silvestre Zeng    Post-Op Diagnosis: Same    Procedure(s):  Pectoral flap to right mandibular wound    Surgeon(s):  Mary Dyson MD    Surgical Assistant: None    Anesthesia: General     Estimated Blood Loss (mL): 410 ml    Complications: None    Specimens:   ID Type Source Tests Collected by Time Destination   1 : soft tissue right neck wound Tissue Neck CULTURE & SMEAR, AFB, CULTURE, FUNGUS Moister, Mercedes Reyes MD 3/22/2022 1347 Microbiology

## 2022-03-23 NOTE — PROGRESS NOTES
Bird catheter and IV removed by NS with supervision.   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

## 2022-03-23 NOTE — PROGRESS NOTES
Comprehensive Nutrition Assessment    Type and Reason for Visit: Initial,Positive nutrition screen  Best Practice Alert for EN/PN PTA    Nutrition Recommendations/Plan:    Enteral Nutrition:   MD placed nursing miscellaneous orders for TF; RD consult not ordered, therefore RD to manage from distance.  Continue at home regimen:   (2) 237ml cartons Jevity 1.5 @ breakfast   (1) 237ml cartons Jevity 1.5 @ lunch   (2) 237ml cartons Jevity 1.5 @ dinner   350 ml free water flush before and after each bolus  Meals and Snacks:  Continue current diet. NPO     Malnutrition Assessment:  Malnutrition Status: Insufficient data    Nutrition Assessment:   Nutrition History: Wife provides nutrition hx. She reports pt remains NPO since 2019 folllowing CVA, PEG placed 2019. Pt also w/ hx of oropharyngeal cancer. At home regimen of (2) 237ml cartons Jevity 1.5 in AM, 1 carton at lunch, 2 cartons at dinner w/ 350ml free water flush before and after administration. Wife reports weight loss, however weight has remained stable at ~155-160lb within last 2 years. Nutrition Background: Pt with PMH significant for prior CVA, PEG w/ chronic TF, Hypercholesterolemia, COPD, hx of oropharyngeal cancer who presented to [facility] 3/22 for right deltopectoral flap reconstruction and possible skin graft from right thigh for reconstruction of R mandible. Nutrition Interval:  Pt seen at bedside, wife present. Wife provides nutrition as above. Wife also reports she has continued at home TF regimen during current admission without difficulties. Discussed RD to follow from distance as MD put TF orders in place, however tube feeding supplies available to patient in hospital if admission prolonged. Wife also voiced concerns over mold in PEG tube, reported PEG has never been replaced since 2019. No other needs for RD at this time. Referred pt to discuss with PCP, also sent PCP message about potential need for replacement.    Abdominal Status (last documented): Flat,Soft,Other (comment) (PEG tube) abdomen with Hypoactive  bowel sounds. Last BM 03/21/22. Pertinent Medications: n/a   Pertinent Labs:  Lab Results   Component Value Date/Time    Sodium 142 03/23/2022 06:52 AM    Potassium 3.5 03/23/2022 06:52 AM    Chloride 109 (H) 03/23/2022 06:52 AM    CO2 29 03/23/2022 06:52 AM    Anion gap 4 (L) 03/23/2022 06:52 AM    Glucose 94 03/23/2022 06:52 AM    BUN 16 03/23/2022 06:52 AM    Creatinine 0.40 (L) 03/23/2022 06:52 AM    Calcium 9.2 03/23/2022 06:52 AM    Albumin 4.6 03/16/2022 10:45 AM    Magnesium 2.2 03/23/2022 06:52 AM    Phosphorus 2.2 (L) 03/23/2022 06:52 AM   pt to be discharged before replacements able to be administered. Nutrition Related Findings:   Pt thin on visual exam, however complete NFPE deferred for pt comfort. Wife administers TF TID per home regimen. Wound Type: Surgical incision    Current Nutrition Therapies:  DIET NPO  Current Tube Feeding (TF) Orders:   · Feeding Route: PEG  · Formula: Standard with fiber  · Schedule: Bolus    · Regimen: 5 cartons Jevity 1.5 daily - 2 @ breakfast, 1 @ lunch, 2 @ dinner  · Additives/Modulars:  (none)  · Water Flushes: 350ml before and after each bolus  · Current TF & Flush Orders Provides: 1776 kcal (100% of estimated needs), 77g protein (100% estimated needs) and ~3000ml free fluid  · Goal TF & Flush Orders Provides: per outpatient RD, meeting estimated inpatient needs    Current Intake:   Average Meal Intake: NPO Average Supplement Intake: NPO      Anthropometric Measures:  Height: 5' 11\" (180.3 cm)  Current Body Wt: 71.7 kg (158 lb 1.1 oz) (3/22), Weight source: Standing scale  BMI: 22.1, Underweight (BMI less than 22) age over 72     Ideal Body Weight (lbs) (Calculated): 172 lbs (78 kg), 91.9 %  Usual Body Wt: 70.3 kg (155 lb) (per wife, UBW for last 2 years), Percent weight change: 2          Edema: No data recorded   Estimated Daily Nutrient Needs:  Energy (kcal/day): 3461-3564 (Kcal/kg (25-30), Weight Used: Current (71.7kg))  Protein (g/day): 72-90 (1-1.25g/kg) Weight Used: (Current (71.7kg))  Fluid (ml/day):   (1 ml/kcal)    Nutrition Diagnosis:   · Inadequate oral intake related to catabolic illness as evidenced by NPO or clear liquid status due to medical condition (hx of oropharyngeal cancer, PEG in place PTA)     Nutrition Interventions:   Food and/or Nutrient Delivery: Continue NPO,Continue tube feeding     Coordination of Nutrition Care: Continue to monitor while inpatient  Plan of Care discussed with Natali Zamudio RN    Goals:       Active Goal: Continue to tolerate at home TF regimen throughout admission    Nutrition Monitoring and Evaluation:      Food/Nutrient Intake Outcomes: Enteral nutrition intake/tolerance  Physical Signs/Symptoms Outcomes: Biochemical data,GI status,Weight    Discharge Planning:    Enteral nutrition    Akilah Barry RD (Abby), LD  Contact: [phone]

## 2022-03-23 NOTE — PROGRESS NOTES
Chart reviewed by [facility]. Patient POD 2, s/p pectoral flap to right mandibular wound. No supportive needs identified to CM. Please consult CM should needs arise. Care Management Interventions  PCP Verified by CM:  Yes (Dr Corey Pavon  [phone])  Mode of Transport at Discharge: Self  Transition of Care Consult (CM Consult): Discharge Planning  Support Systems: Spouse/Significant Other (Spopuse: Radha Videsmario alberto  [phone])  Confirm Follow Up Transport: Self  The Plan for Transition of Care is Related to the Following Treatment Goals : return to baseline  The Patient and/or Patient Representative was Provided with a Choice of Provider and Agrees with the Discharge Plan?: Yes  Freedom of Choice List was Provided with Basic Dialogue that Supports the Patient's Individualized Plan of Care/Goals, Treatment Preferences and Shares the Quality Data Associated with the Providers?: Yes  Discharge Location  Patient Expects to be Discharged to[de-identified] Home with family assistance

## 2022-03-24 ENCOUNTER — APPOINTMENT (OUTPATIENT)
Dept: GENERAL RADIOLOGY | Age: 72
End: 2022-03-24
Attending: EMERGENCY MEDICINE
Payer: MEDICARE

## 2022-03-24 ENCOUNTER — HOSPITAL ENCOUNTER (EMERGENCY)
Age: 72
Discharge: HOME | End: 2022-03-24
Attending: EMERGENCY MEDICINE
Payer: MEDICARE

## 2022-03-24 VITALS
RESPIRATION RATE: 20 BRPM | HEIGHT: 71 IN | DIASTOLIC BLOOD PRESSURE: 63 MMHG | SYSTOLIC BLOOD PRESSURE: 112 MMHG | OXYGEN SATURATION: 92 % | BODY MASS INDEX: 22.12 KG/M2 | WEIGHT: 158 LBS | TEMPERATURE: 98.3 F | HEART RATE: 102 BPM

## 2022-03-24 DIAGNOSIS — R33.9 URINARY RETENTION: Primary | ICD-10-CM

## 2022-03-24 DIAGNOSIS — K59.00 CONSTIPATION, UNSPECIFIED CONSTIPATION TYPE: ICD-10-CM

## 2022-03-24 PROBLEM — Z94.5 STATUS POST SKIN FLAP GRAFT: Status: ACTIVE | Noted: 2022-03-22

## 2022-03-24 LAB
APPEARANCE UR: CLEAR
BACTERIA URNS QL MICRO: 0 /HPF
BILIRUB UR QL: NEGATIVE
CASTS URNS QL MICRO: ABNORMAL /LPF
COLOR UR: YELLOW
EPI CELLS #/AREA URNS HPF: ABNORMAL /HPF
GLUCOSE UR STRIP.AUTO-MCNC: NEGATIVE MG/DL
HGB UR QL STRIP: ABNORMAL
KETONES UR QL STRIP.AUTO: NEGATIVE MG/DL
LEUKOCYTE ESTERASE UR QL STRIP.AUTO: NEGATIVE
NITRITE UR QL STRIP.AUTO: NEGATIVE
PH UR STRIP: 7 [PH] (ref 5–9)
PROT UR STRIP-MCNC: NEGATIVE MG/DL
RBC #/AREA URNS HPF: ABNORMAL /HPF
SP GR UR REFRACTOMETRY: 1.01 (ref 1–1.02)
UROBILINOGEN UR QL STRIP.AUTO: 0.2 EU/DL (ref 0.2–1)
WBC URNS QL MICRO: ABNORMAL /HPF

## 2022-03-24 PROCEDURE — 99284 EMERGENCY DEPT VISIT MOD MDM: CPT

## 2022-03-24 PROCEDURE — 51702 INSERT TEMP BLADDER CATH: CPT

## 2022-03-24 PROCEDURE — 74022 RADEX COMPL AQT ABD SERIES: CPT

## 2022-03-24 PROCEDURE — 81003 URINALYSIS AUTO W/O SCOPE: CPT

## 2022-03-24 RX ORDER — MAGNESIUM CITRATE
296 SOLUTION, ORAL ORAL
Status: DISCONTINUED | OUTPATIENT
Start: 2022-03-24 | End: 2022-03-24 | Stop reason: HOSPADM

## 2022-03-24 NOTE — ED NOTES
Approximately 600ml noted in rmaires catheter drainage bag s/p urinary catheter insertion- ua sent to lab

## 2022-03-24 NOTE — ED NOTES
I have reviewed discharge instructions with the patient. The spouse verbalized understanding. Patient left ED via Discharge Method: wheelchair to Home with wife    Opportunity for questions and clarification provided. Patient given magnesium citrate dose . To continue your aftercare when you leave the hospital, you may receive an automated call from our care team to check in on how you are doing. This is a free service and part of our promise to provide the best care and service to meet your aftercare needs.  If you have questions, or wish to unsubscribe from this service please call [phone]. Thank you for Choosing our [facility] Emergency Department.

## 2022-03-24 NOTE — ED PROVIDER NOTES
56-year-old man had Pectoral flap to right mandibular wound for wound dehiscence by Dr. Mckinley Zarco 2 days ago. He was discharged from the hospital yesterday. He was able to urinate in the hospital after Bird removal, but has been unable to urinate more than 100 cc since arriving home. Also reports constipation and unable to have a bowel movement. Tried MiraLAX without improvement. No vomiting. Does have abdominal discomfort. No documented fever. Urinary Retention   Associated symptoms include constipation. Pertinent negatives include no fever, no diarrhea, no nausea, no vomiting, no headaches, no chest pain and no back pain.         Past Medical History:   Diagnosis Date    Cancer [facility])     throat, 2012    [facility] [address]     Hypercholesterolemia     Stroke ([facility] [address]     6/2019, tube feed, aspiration R weakness,        Past Surgical History:   Procedure Laterality Date    HX COLONOSCOPY      HX ORTHOPAEDIC Right 2019    hip replacement    HX ORTHOPAEDIC Right 01/2019    jaw frx    HX ORTHOPAEDIC Right 07/2020    fracture repair of femur/hip         Family History:   Problem Relation Age of Onset    Lupus Mother     No Known Problems Father     Cancer Sister         esophageal    No Known Problems Brother     No Known Problems Sister     No Known Problems Brother     No Known Problems Brother        Social History     Socioeconomic History    Marital status:      Spouse name: Not on file    Number of children: Not on file    Years of education: Not on file    Highest education level: Not on file   Occupational History    Occupation:    Tobacco Use    Smoking status: Current Every Day Smoker     Packs/day: 0.50     Years: 3.00     Pack years: 1.50     Types: Cigarettes    Smokeless tobacco: Never Used    Tobacco comment: 8-10 per day   Vaping Use    Vaping Use: Never used   Substance and Sexual Activity    Alcohol use: Not Currently    Drug use: Never    Sexual activity: Not on file   Other Topics Concern    Not on file   Social History Narrative    Not on file     Social Determinants of Health     Financial Resource Strain:     Difficulty of Paying Living Expenses: Not on file   Food Insecurity:     Worried About Running Out of Food in the Last Year: Not on file    Berenice of Food in the Last Year: Not on file   Transportation Needs:     Lack of Transportation (Medical): Not on file    Lack of Transportation (Non-Medical): Not on file   Physical Activity:     Days of Exercise per Week: Not on file    Minutes of Exercise per Session: Not on file   Stress:     Feeling of Stress : Not on file   Social Connections:     Frequency of Communication with Friends and Family: Not on file    Frequency of Social Gatherings with Friends and Family: Not on file    Attends Latter day Services: Not on file    Active Member of [address] [place] [address] [employer] or Organizations: Not on file    Attends Club or Organization Meetings: Not on file    Marital Status: Not on file   Intimate Partner Violence:     Fear of Current or Ex-Partner: Not on file    Emotionally Abused: Not on file    Physically Abused: Not on file    Sexually Abused: Not on file   Housing Stability:     Unable to Pay for Housing in the Last Year: Not on file    Number of [place] in the Last Year: Not on file    Unstable Housing in the Last Year: Not on file         ALLERGIES: Patient has no known allergies. Review of Systems   Constitutional: Negative for fever. HENT: Negative for hearing loss. Eyes: Negative for visual disturbance. Respiratory: Negative for cough and shortness of breath. Cardiovascular: Negative for chest pain. Gastrointestinal: Positive for abdominal pain and constipation. Negative for diarrhea, nausea and vomiting. Genitourinary: Positive for difficulty urinating. Musculoskeletal: Negative for back pain. Skin: Negative for rash.    Neurological: Negative for headaches. Psychiatric/Behavioral: Negative for confusion. All other systems reviewed and are negative. Vitals:    03/24/22 1215 03/24/22 1313   BP: 128/67 128/71   Pulse: (!) 102    Resp: 20    Temp: 98.3 °F (36.8 °C)    SpO2: 97% 94%   Weight: 71.7 kg (158 lb)    Height: 5' 11\" (1.803 m)             Physical Exam  Vitals and nursing note reviewed. Constitutional:       Appearance: Normal appearance. HENT:      Head: Normocephalic and atraumatic. Comments: Large right facial and neck mass with dressing in place     Nose: Nose normal.      Mouth/Throat:      Mouth: Mucous membranes are moist.   Eyes:      Pupils: Pupils are equal, round, and reactive to light. Cardiovascular:      Rate and Rhythm: Regular rhythm. Tachycardia present. Pulmonary:      Effort: Pulmonary effort is normal.      Breath sounds: No stridor. Abdominal:      General: Abdomen is flat. Tenderness: There is abdominal tenderness. Musculoskeletal:         General: No deformity. Normal range of motion. Cervical back: Normal range of motion and neck supple. Skin:     General: Skin is warm and dry. Neurological:      General: No focal deficit present. Mental Status: He is alert. Mental status is at baseline. Psychiatric:         Mood and Affect: Mood normal.         Behavior: Behavior normal.          MDM  Number of Diagnoses or Management Options  Diagnosis management comments: Parts of this document were created using dragon voice recognition software. The chart has been reviewed but errors may still be present. I wore appropriate PPE throughout this patient's ED visit. Dorys Barclay MD, 1:20 PM    2:07 PM  600cc UOP with ramires. Will dc with leg bag. Large stool burden in right colon. Will give mag citrate to take home. Given urology follow up     I discussed the results of all labs, procedures, radiographs, and treatments with the patient and available family.   Treatment plan is agreed upon and the patient is ready for discharge. Questions about treatment in the ED and differential diagnosis of presenting condition were answered. Patient was given verbal discharge instructions including, but not limited to, importance of returning to the emergency department for any concern of worsening or continued symptoms. Instructions were given to follow up with a primary care provider or specialist within 1-2 days. Adverse effects of medications, if prescribed, were discussed and patient was advised to refrain from significant physical activity until followed up by primary care physician and to not drive or operate heavy machinery after taking any sedating substances.            Amount and/or Complexity of Data Reviewed  Clinical lab tests: ordered and reviewed  Tests in the radiology section of CPT®: reviewed and ordered  Tests in the medicine section of CPT®: ordered           Procedures

## 2022-03-24 NOTE — ED TRIAGE NOTES
Pt arrives via [facility] to triage. Reports had surgery 2 days ago and is now unable to void. Voided around 100mLs today. Wife denies seeing blood in urine. Also has not had a BM since surgery, started miralax today. NAD. Masked.

## 2022-04-04 NOTE — OP NOTES
Operative Note    Patient: Jasson Sheldon  YOB: 1950  MRN: [id number]    Date of Procedure: 3/22/2022     Pre-Op Diagnosis: Exposed mandible reconstruction plate    Post-Op Diagnosis: Same     Procedure(s):  Musculocutaneous pectoralis muscle flap reconstruction of mandibular defect  Intraoperative fluorescence angiography    Prior to the procedure the patient was met in holding. Once again a discussion of the risks, benefits and alternatives was conducted including but not limited to bleeding, infection, failure of the surgery, need for further procedures, disappointing or unacceptable cosmesis, damage to adjacent structures was conducted. The patient again affirmed understanding and consent. The patient was marked in the upright and relaxed position. Patient brought to the OR, surgical timeout performed and anesthesia induced. Patient positioned and prepped and draped. Attention turned to right chest. Doppler ultrasound used to evaluated pedicle and flap designed around pedicle course to include a skin island. Wound at the mandible excised and wound bed debrided with curette and pulsavac. Cultures sent. Patient noted to have bony defect underlying plate. Skin island excised and elevated on pectoralis. Dissection proceeded to pedicle and muscle released peripherally including division of the muscle insertion. Intraoperative fluorescence used to evaluate flap and skin paddle. Flap well perfused. Due to significant scarring, previous surgery and radiation damage at the neck the flap was brought over the intervening skin rather than tunneling. Flap inset along superior border of skin paddle with fascial and deep dermal 3-0 monocryl and 4-0 nylon at the skin. 10 flap ELAINE drain placed at the chest. Chest defect closed primarily with deep dermal 3-0 monocryl, fascial monocryl and staples. Angiography repeated after inset and flap remained well perfused.  Dressed with xeroform and gauze.           Surgeon(s):  Darlene Gonzalez MD    Surgical Assistant: None    Anesthesia: General     Estimated Blood Loss (mL): 10 ml    Complications: None immediate    Specimens:   ID Type Source Tests Collected by Time Destination   1 : soft tissue right neck wound Tissue Neck CULTURE & SMEAR, AFB, CULTURE, FUNGUS Emily Stroud MD 3/22/2022 2175 Microbiology        Implants: * No implants in log *    Drains:   [REMOVED] Maurizio-Thompson Drain 03/22/22 Right;Mid Chest (Removed)   Dressing Status New drainage 03/23/22 0820   Status Draining;Leakage around drain; Charged; Patent 03/23/22 0820   Drainage Color Sanguinous 03/23/22 0820   Output (ml) 10 ml 03/22/22 2316       [REMOVED] Maurizio-Thompson Drain 03/22/22 Right; Outer Chest (Removed)   Dressing Status New drainage 03/23/22 0820   Status Draining;Patent;Leakage around drain;Suction (specify 03/23/22 0820   Drainage Color Sanguinous 03/23/22 0820   Output (ml) 30 ml 03/22/22 2316       [REMOVED] PEG/Gastrostomy Tube (Removed)   Site Assessment Clean, dry, & intact 03/23/22 0820   Dressing Status Clean, dry, & intact 03/23/22 0820   G Port Status Clamped 03/23/22 0820

## 2022-04-22 ENCOUNTER — ANESTHESIA EVENT (OUTPATIENT)
Dept: SURGERY | Age: 72
End: 2022-04-22
Payer: MEDICARE

## 2022-04-22 LAB
FUNGUS CULTURE, RFCO2T: NORMAL
FUNGUS SMEAR, RFCO1T: NORMAL
FUNGUS SPEC CULT: NORMAL
FUNGUS STAIN, 188244: NORMAL
REFLEX TO ID, RFCO3T: NORMAL
SPECIMEN SOURCE: NORMAL
SPECIMEN SOURCE: NORMAL

## 2022-04-26 ENCOUNTER — HOSPITAL ENCOUNTER (OUTPATIENT)
Age: 72
Setting detail: OUTPATIENT SURGERY
Discharge: HOME | End: 2022-04-26
Attending: SURGERY | Admitting: SURGERY
Payer: MEDICARE

## 2022-04-26 ENCOUNTER — ANESTHESIA (OUTPATIENT)
Dept: SURGERY | Age: 72
End: 2022-04-26
Payer: MEDICARE

## 2022-04-26 VITALS
HEART RATE: 85 BPM | HEIGHT: 72 IN | OXYGEN SATURATION: 94 % | SYSTOLIC BLOOD PRESSURE: 126 MMHG | RESPIRATION RATE: 16 BRPM | WEIGHT: 160 LBS | DIASTOLIC BLOOD PRESSURE: 71 MMHG | BODY MASS INDEX: 21.67 KG/M2 | TEMPERATURE: 98 F

## 2022-04-26 PROCEDURE — 99283 EMERGENCY DEPT VISIT LOW MDM: CPT

## 2022-04-26 PROCEDURE — 74011000250 HC RX REV CODE- 250: Performed by: NURSE ANESTHETIST, CERTIFIED REGISTERED

## 2022-04-26 PROCEDURE — 77030037088 HC TUBE ENDOTRACH ORAL NSL COVD-A: Performed by: ANESTHESIOLOGY

## 2022-04-26 PROCEDURE — 77030013629 HC ELECTRD NDL STRY -B: Performed by: SURGERY

## 2022-04-26 PROCEDURE — 77030039425 HC BLD LARYNG TRULITE DISP TELE -A: Performed by: ANESTHESIOLOGY

## 2022-04-26 PROCEDURE — 65270000029 HC RM PRIVATE

## 2022-04-26 PROCEDURE — 77030040922 HC BLNKT HYPOTHRM STRY -A: Performed by: ANESTHESIOLOGY

## 2022-04-26 PROCEDURE — 77030031139 HC SUT VCRL2 J&J -A: Performed by: SURGERY

## 2022-04-26 PROCEDURE — 76010000161 HC OR TIME 1 TO 1.5 HR INTENSV-TIER 1: Performed by: SURGERY

## 2022-04-26 PROCEDURE — 85025 COMPLETE CBC W/AUTO DIFF WBC: CPT

## 2022-04-26 PROCEDURE — 80053 COMPREHEN METABOLIC PANEL: CPT

## 2022-04-26 PROCEDURE — 77030002996 HC SUT SLK J&J -A: Performed by: SURGERY

## 2022-04-26 PROCEDURE — 77030041445 HC PENCL ELECT SMK EVAC STRY -B: Performed by: SURGERY

## 2022-04-26 PROCEDURE — 74011250636 HC RX REV CODE- 250/636: Performed by: SURGERY

## 2022-04-26 PROCEDURE — 76210000006 HC OR PH I REC 0.5 TO 1 HR: Performed by: SURGERY

## 2022-04-26 PROCEDURE — 77030008462 HC STPLR SKN PROX J&J -A: Performed by: SURGERY

## 2022-04-26 PROCEDURE — 77030002933 HC SUT MCRYL J&J -A: Performed by: SURGERY

## 2022-04-26 PROCEDURE — 74011250636 HC RX REV CODE- 250/636: Performed by: ANESTHESIOLOGY

## 2022-04-26 PROCEDURE — 2709999900 HC NON-CHARGEABLE SUPPLY: Performed by: SURGERY

## 2022-04-26 PROCEDURE — 77030019908 HC STETH ESOPH SIMS -A: Performed by: ANESTHESIOLOGY

## 2022-04-26 PROCEDURE — 77030016441 HC APPL CLP LIG1 J&J -B: Performed by: SURGERY

## 2022-04-26 PROCEDURE — 74011250636 HC RX REV CODE- 250/636: Performed by: NURSE ANESTHETIST, CERTIFIED REGISTERED

## 2022-04-26 PROCEDURE — 76060000034 HC ANESTHESIA 1.5 TO 2 HR: Performed by: SURGERY

## 2022-04-26 PROCEDURE — 77030018836 HC SOL IRR NACL ICUM -A: Performed by: SURGERY

## 2022-04-26 PROCEDURE — 77030002916 HC SUT ETHLN J&J -A: Performed by: SURGERY

## 2022-04-26 PROCEDURE — 77030021678 HC GLIDESCP STAT DISP VERT -B: Performed by: ANESTHESIOLOGY

## 2022-04-26 PROCEDURE — 77030003666 HC NDL SPINAL BD -A: Performed by: SURGERY

## 2022-04-26 PROCEDURE — 76210000021 HC REC RM PH II 0.5 TO 1 HR: Performed by: SURGERY

## 2022-04-26 PROCEDURE — 77030011283 HC ELECTRD NDL COVD -A: Performed by: SURGERY

## 2022-04-26 PROCEDURE — 77030040506 HC DRN WND MDII -A: Performed by: SURGERY

## 2022-04-26 PROCEDURE — 74011000250 HC RX REV CODE- 250: Performed by: SURGERY

## 2022-04-26 RX ORDER — LIDOCAINE HYDROCHLORIDE 10 MG/ML
0.1 INJECTION INFILTRATION; PERINEURAL AS NEEDED
Status: DISCONTINUED | OUTPATIENT
Start: 2022-04-26 | End: 2022-04-26 | Stop reason: HOSPADM

## 2022-04-26 RX ORDER — ACETAMINOPHEN 325 MG/1
650 TABLET ORAL ONCE
Status: DISCONTINUED | OUTPATIENT
Start: 2022-04-26 | End: 2022-04-26 | Stop reason: HOSPADM

## 2022-04-26 RX ORDER — GLYCOPYRROLATE 0.2 MG/ML
INJECTION INTRAMUSCULAR; INTRAVENOUS AS NEEDED
Status: DISCONTINUED | OUTPATIENT
Start: 2022-04-26 | End: 2022-04-26 | Stop reason: HOSPADM

## 2022-04-26 RX ORDER — ONDANSETRON 2 MG/ML
INJECTION INTRAMUSCULAR; INTRAVENOUS AS NEEDED
Status: DISCONTINUED | OUTPATIENT
Start: 2022-04-26 | End: 2022-04-26 | Stop reason: HOSPADM

## 2022-04-26 RX ORDER — TRISODIUM CITRATE DIHYDRATE AND CITRIC ACID MONOHYDRATE 500; 334 MG/5ML; MG/5ML
30 SOLUTION ORAL
Status: DISCONTINUED | OUTPATIENT
Start: 2022-04-26 | End: 2022-04-26 | Stop reason: HOSPADM

## 2022-04-26 RX ORDER — ONDANSETRON 2 MG/ML
4 INJECTION INTRAMUSCULAR; INTRAVENOUS
Status: DISCONTINUED | OUTPATIENT
Start: 2022-04-26 | End: 2022-04-26 | Stop reason: HOSPADM

## 2022-04-26 RX ORDER — NEOSTIGMINE METHYLSULFATE 1 MG/ML
INJECTION, SOLUTION INTRAVENOUS AS NEEDED
Status: DISCONTINUED | OUTPATIENT
Start: 2022-04-26 | End: 2022-04-26 | Stop reason: HOSPADM

## 2022-04-26 RX ORDER — SODIUM CHLORIDE 0.9 % (FLUSH) 0.9 %
5-40 SYRINGE (ML) INJECTION AS NEEDED
Status: DISCONTINUED | OUTPATIENT
Start: 2022-04-26 | End: 2022-04-26 | Stop reason: HOSPADM

## 2022-04-26 RX ORDER — NALOXONE HYDROCHLORIDE 0.4 MG/ML
0.2 INJECTION, SOLUTION INTRAMUSCULAR; INTRAVENOUS; SUBCUTANEOUS AS NEEDED
Status: DISCONTINUED | OUTPATIENT
Start: 2022-04-26 | End: 2022-04-26 | Stop reason: HOSPADM

## 2022-04-26 RX ORDER — SODIUM CHLORIDE 0.9 % (FLUSH) 0.9 %
5-40 SYRINGE (ML) INJECTION EVERY 8 HOURS
Status: DISCONTINUED | OUTPATIENT
Start: 2022-04-26 | End: 2022-04-26 | Stop reason: HOSPADM

## 2022-04-26 RX ORDER — ALBUTEROL SULFATE 0.83 MG/ML
2.5 SOLUTION RESPIRATORY (INHALATION)
Status: DISCONTINUED | OUTPATIENT
Start: 2022-04-26 | End: 2022-04-26 | Stop reason: HOSPADM

## 2022-04-26 RX ORDER — CEFAZOLIN SODIUM/WATER 2 G/20 ML
2 SYRINGE (ML) INTRAVENOUS ONCE
Status: COMPLETED | OUTPATIENT
Start: 2022-04-26 | End: 2022-04-26

## 2022-04-26 RX ORDER — PROPOFOL 10 MG/ML
INJECTION, EMULSION INTRAVENOUS AS NEEDED
Status: DISCONTINUED | OUTPATIENT
Start: 2022-04-26 | End: 2022-04-26 | Stop reason: HOSPADM

## 2022-04-26 RX ORDER — FLUMAZENIL 0.1 MG/ML
0.2 INJECTION INTRAVENOUS
Status: DISCONTINUED | OUTPATIENT
Start: 2022-04-26 | End: 2022-04-26 | Stop reason: HOSPADM

## 2022-04-26 RX ORDER — OXYCODONE HYDROCHLORIDE 5 MG/1
5 TABLET ORAL
Status: DISCONTINUED | OUTPATIENT
Start: 2022-04-26 | End: 2022-04-26 | Stop reason: HOSPADM

## 2022-04-26 RX ORDER — ROCURONIUM BROMIDE 10 MG/ML
INJECTION, SOLUTION INTRAVENOUS AS NEEDED
Status: DISCONTINUED | OUTPATIENT
Start: 2022-04-26 | End: 2022-04-26 | Stop reason: HOSPADM

## 2022-04-26 RX ORDER — FENTANYL CITRATE 50 UG/ML
INJECTION, SOLUTION INTRAMUSCULAR; INTRAVENOUS AS NEEDED
Status: DISCONTINUED | OUTPATIENT
Start: 2022-04-26 | End: 2022-04-26 | Stop reason: HOSPADM

## 2022-04-26 RX ORDER — EPINEPHRINE 1 MG/ML
INJECTION INTRAMUSCULAR; INTRAVENOUS; SUBCUTANEOUS AS NEEDED
Status: DISCONTINUED | OUTPATIENT
Start: 2022-04-26 | End: 2022-04-26 | Stop reason: HOSPADM

## 2022-04-26 RX ORDER — HYDROMORPHONE HYDROCHLORIDE 2 MG/ML
0.5 INJECTION, SOLUTION INTRAMUSCULAR; INTRAVENOUS; SUBCUTANEOUS
Status: DISCONTINUED | OUTPATIENT
Start: 2022-04-26 | End: 2022-04-26 | Stop reason: HOSPADM

## 2022-04-26 RX ORDER — SODIUM CHLORIDE, SODIUM LACTATE, POTASSIUM CHLORIDE, CALCIUM CHLORIDE 600; 310; 30; 20 MG/100ML; MG/100ML; MG/100ML; MG/100ML
75 INJECTION, SOLUTION INTRAVENOUS CONTINUOUS
Status: DISCONTINUED | OUTPATIENT
Start: 2022-04-26 | End: 2022-04-26 | Stop reason: HOSPADM

## 2022-04-26 RX ORDER — LIDOCAINE HYDROCHLORIDE 10 MG/ML
INJECTION INFILTRATION; PERINEURAL AS NEEDED
Status: DISCONTINUED | OUTPATIENT
Start: 2022-04-26 | End: 2022-04-26 | Stop reason: HOSPADM

## 2022-04-26 RX ORDER — LIDOCAINE HYDROCHLORIDE 20 MG/ML
INJECTION, SOLUTION EPIDURAL; INFILTRATION; INTRACAUDAL; PERINEURAL AS NEEDED
Status: DISCONTINUED | OUTPATIENT
Start: 2022-04-26 | End: 2022-04-26 | Stop reason: HOSPADM

## 2022-04-26 RX ORDER — SODIUM CHLORIDE, SODIUM LACTATE, POTASSIUM CHLORIDE, CALCIUM CHLORIDE 600; 310; 30; 20 MG/100ML; MG/100ML; MG/100ML; MG/100ML
100 INJECTION, SOLUTION INTRAVENOUS CONTINUOUS
Status: DISCONTINUED | OUTPATIENT
Start: 2022-04-26 | End: 2022-04-26 | Stop reason: HOSPADM

## 2022-04-26 RX ORDER — DEXAMETHASONE SODIUM PHOSPHATE 4 MG/ML
INJECTION, SOLUTION INTRA-ARTICULAR; INTRALESIONAL; INTRAMUSCULAR; INTRAVENOUS; SOFT TISSUE AS NEEDED
Status: DISCONTINUED | OUTPATIENT
Start: 2022-04-26 | End: 2022-04-26 | Stop reason: HOSPADM

## 2022-04-26 RX ORDER — MIDAZOLAM HYDROCHLORIDE 1 MG/ML
2 INJECTION, SOLUTION INTRAMUSCULAR; INTRAVENOUS
Status: DISCONTINUED | OUTPATIENT
Start: 2022-04-26 | End: 2022-04-26 | Stop reason: HOSPADM

## 2022-04-26 RX ADMIN — DEXAMETHASONE SODIUM PHOSPHATE 4 MG: 4 INJECTION, SOLUTION INTRAMUSCULAR; INTRAVENOUS at 14:09

## 2022-04-26 RX ADMIN — LIDOCAINE HYDROCHLORIDE 80 MG: 20 INJECTION, SOLUTION EPIDURAL; INFILTRATION; INTRACAUDAL; PERINEURAL at 13:53

## 2022-04-26 RX ADMIN — HYDROMORPHONE HYDROCHLORIDE 0.5 MG: 2 INJECTION, SOLUTION INTRAMUSCULAR; INTRAVENOUS; SUBCUTANEOUS at 15:50

## 2022-04-26 RX ADMIN — GLYCOPYRROLATE 0.4 MG: 0.2 INJECTION, SOLUTION INTRAMUSCULAR; INTRAVENOUS at 15:04

## 2022-04-26 RX ADMIN — FENTANYL CITRATE 50 MCG: 50 INJECTION INTRAMUSCULAR; INTRAVENOUS at 14:28

## 2022-04-26 RX ADMIN — Medication 3 MG: at 15:04

## 2022-04-26 RX ADMIN — HYDROMORPHONE HYDROCHLORIDE 0.5 MG: 2 INJECTION, SOLUTION INTRAMUSCULAR; INTRAVENOUS; SUBCUTANEOUS at 16:00

## 2022-04-26 RX ADMIN — ROCURONIUM BROMIDE 40 MG: 50 INJECTION, SOLUTION INTRAVENOUS at 13:53

## 2022-04-26 RX ADMIN — PROPOFOL 150 MG: 10 INJECTION, EMULSION INTRAVENOUS at 13:53

## 2022-04-26 RX ADMIN — PHENYLEPHRINE HYDROCHLORIDE 100 MCG: 10 INJECTION INTRAVENOUS at 14:08

## 2022-04-26 RX ADMIN — Medication 2 G: at 14:06

## 2022-04-26 RX ADMIN — ONDANSETRON 4 MG: 2 INJECTION INTRAMUSCULAR; INTRAVENOUS at 14:09

## 2022-04-26 RX ADMIN — SODIUM CHLORIDE, POTASSIUM CHLORIDE, SODIUM LACTATE AND CALCIUM CHLORIDE 100 ML/HR: 600; 310; 30; 20 INJECTION, SOLUTION INTRAVENOUS at 11:41

## 2022-04-26 RX ADMIN — PHENYLEPHRINE HYDROCHLORIDE 100 MCG: 10 INJECTION INTRAVENOUS at 14:55

## 2022-04-26 RX ADMIN — PHENYLEPHRINE HYDROCHLORIDE 100 MCG: 10 INJECTION INTRAVENOUS at 14:12

## 2022-04-26 RX ADMIN — ROCURONIUM BROMIDE 10 MG: 50 INJECTION, SOLUTION INTRAVENOUS at 14:16

## 2022-04-26 NOTE — DISCHARGE INSTRUCTIONS
[address] care    How do I empty my [address]? You or a family member will need to empty the ELAINE drain a few times each day. Usually you empty the ELAINE drain when it is half full but follow caregiver's instructions closely. · Gather all the items you will need. · Clean urine collection cup or container to measure drainage. · Clean rubber or latex exam gloves. · Waterproof pad or bath towel. · Bowl of warm water, soap, washcloth, and hand towel. · Notebook to write down fluid amounts and information. · Wash your hands with warm water and soap. Dry your hands completely. · Put on clean gloves. · Unpin the drain tube and bulb from your clothing. · Place the waterproof pad or towel under the side where the drain is to soak up any spills. · Hold the bulb in one hand lower than the wound. This will prevent drainage from flowing back into the tubing and wound. · With the other hand, remove the plug from the drainage spout. Tip the bulb upside down and squeeze the fluid into the collection cup. Do not touch the tip of the spout with the mouth of the collection cup or anything else. This re-creates the suction inside the bulb. Do not squeeze the bulb if the plug is in place. · If your caregiver allows, squeeze the fluid in the tubing near the bandaged site and move it down into the bulb. This is called \"milking\" the tubing. Only milk the tubing if caregivers instruct you closely on how to do this. · Re-pin the tape tab of the drain tubing and bulb back to your clothing. · Gently wash any areas where fluid spilled with warm soapy water. Do not get the drain bandage wet. · Flush the drainage fluid and wash water down the toilet. Put all used supplies in the trash bag along with your gloves, which you take off last.   · Wash your hands and dry your hands. · Write down the amount, color, and odor of the fluid.   Caregivers will look at this information during your next visit. ACTIVITY  · As tolerated and as directed by your doctor. · Bathe or shower as directed by your doctor. DIET  · Clear liquids until no nausea or vomiting; then light diet for the first day. · Advance to regular diet on second day, unless your doctor orders otherwise. · If nausea and vomiting continues, call your doctor. PAIN  · Take pain medication as directed by your doctor. · Call your doctor if pain is NOT relieved by medication. · DO NOT take aspirin of blood thinners unless directed by your doctor. MEDICATION INTERACTION:During your procedure you potentially received a medication or medications which may reduce the effectiveness of oral contraceptives. Please consider other forms of contraception for 1 month following your procedure if you are currently using oral contraceptives as your primary form of birth control. In addition to this, we recommend continuing your oral contraceptive as prescribed, unless otherwise instructed by your physician, during this time      [address] IF   · Excessive bleeding that does not stop after holding pressure over the area  · Temperature of 101 degrees F or above  · Excessive redness, swelling or bruising, and/ or green or yellow, smelly discharge from incision    After general anesthesia or intravenous sedation, for 24 hours or while taking prescription Narcotics:  · Limit your activities  · A responsible adult needs to be with you for the next 24 hours  · Do not drive and operate hazardous machinery  · Do not make important personal or business decisions  · Do not drink alcoholic beverages  · If you have not urinated within 8 hours after discharge, and you are experiencing discomfort from urinary retention, please go to the nearest ED. · If you have sleep apnea and have a CPAP machine, please use it for all naps and sleeping. · Please use caution when taking narcotics and any of your home medications that may cause drowsiness.   * Please give a list of your current medications to your Primary Care Provider. *  Please update this list whenever your medications are discontinued, doses are      changed, or new medications (including over-the-counter products) are added. *  Please carry medication information at all times in case of emergency situations. These are general instructions for a healthy lifestyle:  No smoking/ No tobacco products/ Avoid exposure to second hand smoke  Surgeon General's Warning:  Quitting smoking now greatly reduces serious risk to your health. Obesity, smoking, and sedentary lifestyle greatly increases your risk for illness  A healthy diet, regular physical exercise & weight monitoring are important for maintaining a healthy lifestyle    You may be retaining fluid if you have a history of heart failure or if you experience any of the following symptoms:  Weight gain of 3 pounds or more overnight or 5 pounds in a week, increased swelling in our hands or feet or shortness of breath while lying flat in bed. Please call your doctor as soon as you notice any of these symptoms; do not wait until your next office visit.

## 2022-04-26 NOTE — ANESTHESIA PREPROCEDURE EVALUATION
Relevant Problems   RESPIRATORY SYSTEM   (+) Chronic obstructive pulmonary disease (HCC)       Anesthetic History               Review of Systems / Medical History  Patient summary reviewed and pertinent labs reviewed    Pulmonary    COPD: mild               Neuro/Psych       CVA (Stroke in 2019 - RUE hemiparesis, aphasia, aspiration )      Comments: Expressive aphasia  Cardiovascular              Hyperlipidemia    Exercise tolerance: <4 METS: D/t RLE weakness  Comments: Home meds - plavix and aspirin - last dose yesterday    3/2022 -  EKG: normal EKG, normal sinus rhythm    Carotid US 2020-  ARACELY:  No hemodynamically significant stenosis. Right external carotid artery  is occluded. LICA:  98-91% stenosis based on markedly elevated peak systolic velocity and ICA/CCA ratio. GI/Hepatic/Renal  Within defined limits             Comments: PEG tube (from CVA)  Endo/Other        Cancer (oropharyngeal s/p radiation)     Other Findings   Comments: Patient only able to drink small sips of thin liquids - aspiration risk    3/22/22 - Deltopectoral Flap to R mandible, easy DL for intubation                  Physical Exam    Airway  Mallampati: III  TM Distance: 4 - 6 cm  Neck ROM: decreased range of motion   Mouth opening: Normal     Cardiovascular    Rhythm: regular  Rate: normal         Dental    Dentition: Edentulous     Pulmonary  Breath sounds clear to auscultation               Abdominal         Other Findings            Anesthetic Plan    ASA: 3  Anesthesia type: general          Induction: Intravenous  Anesthetic plan and risks discussed with: Patient and Spouse      H/O neck radiation but easy DL. Spoke with Dr. Tri Morrow - Oral ETT ok for this case. Today's neck ROM limited compared to prior anesthetic. Have glide scope available.

## 2022-04-26 NOTE — H&P
CC: R jaw wound   HPI: 79 yo with complex history of oropharyngeal cancer eventually requiring free fibula flap reconstruction of the R mandible. Now with exposed hardware at the mandible. Past Medical History:   Diagnosis Date    Cancer [facility])     throat, 2012    [facility] [address]     Hypercholesterolemia     Stroke ([facility] [address]     6/2019, tube feed, aspiration R weakness,      Past Surgical History:   Procedure Laterality Date    HX COLONOSCOPY      HX ORTHOPAEDIC Right 2019    hip replacement    HX ORTHOPAEDIC Right 01/2019    jaw frx    HX ORTHOPAEDIC Right 07/2020    fracture repair of femur/hip     A&O x3  RRR  Resp clear  Flap c/d/i     A/P  Will continue with division and inset of right chest flap. Consent affirmed.

## 2022-04-26 NOTE — ANESTHESIA POSTPROCEDURE EVALUATION
Procedure(s):  DIVISION AND INSET OF RIGHT CHEEK MUSCLE FLAP/ 2ND STAGE. general    Anesthesia Post Evaluation      Multimodal analgesia: multimodal analgesia used between 6 hours prior to anesthesia start to PACU discharge  Patient location during evaluation: bedside  Patient participation: complete - patient participated  Level of consciousness: responsive to verbal stimuli  Pain management: adequate  Airway patency: patent  Anesthetic complications: no  Cardiovascular status: hemodynamically stable  Respiratory status: spontaneous ventilation  Hydration status: stable    Final Post Anesthesia Temperature Assessment:  Normothermia (36.0-37.5 degrees C)      INITIAL Post-op Vital signs:   Vitals Value Taken Time   /58 04/26/22 1600   Temp 36.1 °C (97 °F) 04/26/22 1520   Pulse 73 04/26/22 1601   Resp 16 04/26/22 1554   SpO2 93 % 04/26/22 1601   Vitals shown include unvalidated device data.

## 2022-04-27 ENCOUNTER — HOSPITAL ENCOUNTER (EMERGENCY)
Age: 72
Discharge: HOME | End: 2022-04-27
Attending: EMERGENCY MEDICINE
Payer: MEDICARE

## 2022-04-27 VITALS
HEIGHT: 71 IN | WEIGHT: 160 LBS | OXYGEN SATURATION: 94 % | HEART RATE: 85 BPM | RESPIRATION RATE: 18 BRPM | DIASTOLIC BLOOD PRESSURE: 74 MMHG | BODY MASS INDEX: 22.4 KG/M2 | SYSTOLIC BLOOD PRESSURE: 136 MMHG | TEMPERATURE: 97.3 F

## 2022-04-27 DIAGNOSIS — R33.8 ACUTE URINARY RETENTION: Primary | ICD-10-CM

## 2022-04-27 LAB
ALBUMIN SERPL-MCNC: 3.2 G/DL (ref 3.2–4.6)
ALBUMIN/GLOB SERPL: 0.7 {RATIO} (ref 1.2–3.5)
ALP SERPL-CCNC: 136 U/L (ref 50–136)
ALT SERPL-CCNC: 26 U/L (ref 12–65)
ANION GAP SERPL CALC-SCNC: 9 MMOL/L (ref 7–16)
APPEARANCE UR: CLEAR
AST SERPL-CCNC: 15 U/L (ref 15–37)
BASOPHILS # BLD: 0 K/UL (ref 0–0.2)
BASOPHILS NFR BLD: 0 % (ref 0–2)
BILIRUB SERPL-MCNC: 0.5 MG/DL (ref 0.2–1.1)
BILIRUB UR QL: NEGATIVE
BUN SERPL-MCNC: 16 MG/DL (ref 8–23)
CALCIUM SERPL-MCNC: 9.3 MG/DL (ref 8.3–10.4)
CHLORIDE SERPL-SCNC: 105 MMOL/L (ref 98–107)
CO2 SERPL-SCNC: 27 MMOL/L (ref 21–32)
COLOR UR: YELLOW
CREAT SERPL-MCNC: 0.8 MG/DL (ref 0.8–1.5)
DIFFERENTIAL METHOD BLD: ABNORMAL
EOSINOPHIL # BLD: 0 K/UL (ref 0–0.8)
EOSINOPHIL NFR BLD: 0 % (ref 0.5–7.8)
ERYTHROCYTE [DISTWIDTH] IN BLOOD BY AUTOMATED COUNT: 14.9 % (ref 11.9–14.6)
GLOBULIN SER CALC-MCNC: 4.5 G/DL (ref 2.3–3.5)
GLUCOSE SERPL-MCNC: 152 MG/DL (ref 65–100)
GLUCOSE UR STRIP.AUTO-MCNC: NEGATIVE MG/DL
HCT VFR BLD AUTO: 32 % (ref 41.1–50.3)
HGB BLD-MCNC: 10.5 G/DL (ref 13.6–17.2)
HGB UR QL STRIP: NEGATIVE
IMM GRANULOCYTES # BLD AUTO: 0 K/UL (ref 0–0.5)
IMM GRANULOCYTES NFR BLD AUTO: 0 % (ref 0–5)
KETONES UR QL STRIP.AUTO: NEGATIVE MG/DL
LEUKOCYTE ESTERASE UR QL STRIP.AUTO: NEGATIVE
LYMPHOCYTES # BLD: 0.4 K/UL (ref 0.5–4.6)
LYMPHOCYTES NFR BLD: 4 % (ref 13–44)
MCH RBC QN AUTO: 35.1 PG (ref 26.1–32.9)
MCHC RBC AUTO-ENTMCNC: 32.8 G/DL (ref 31.4–35)
MCV RBC AUTO: 107 FL (ref 79.6–97.8)
MONOCYTES # BLD: 0.4 K/UL (ref 0.1–1.3)
MONOCYTES NFR BLD: 5 % (ref 4–12)
NEUTS SEG # BLD: 8.1 K/UL (ref 1.7–8.2)
NEUTS SEG NFR BLD: 90 % (ref 43–78)
NITRITE UR QL STRIP.AUTO: NEGATIVE
NRBC # BLD: 0 K/UL (ref 0–0.2)
PH UR STRIP: 7.5 [PH] (ref 5–9)
PLATELET # BLD AUTO: 251 K/UL (ref 150–450)
PMV BLD AUTO: 12.4 FL (ref 9.4–12.3)
POTASSIUM SERPL-SCNC: 4.2 MMOL/L (ref 3.5–5.1)
PROT SERPL-MCNC: 7.7 G/DL (ref 6.3–8.2)
PROT UR STRIP-MCNC: NEGATIVE MG/DL
RBC # BLD AUTO: 2.99 M/UL (ref 4.23–5.6)
SODIUM SERPL-SCNC: 141 MMOL/L (ref 136–145)
SP GR UR REFRACTOMETRY: 1.01 (ref 1–1.02)
UROBILINOGEN UR QL STRIP.AUTO: 0.2 EU/DL (ref 0.2–1)
WBC # BLD AUTO: 9 K/UL (ref 4.3–11.1)

## 2022-04-27 PROCEDURE — 81003 URINALYSIS AUTO W/O SCOPE: CPT

## 2022-04-27 NOTE — ED TRIAGE NOTES
Patient  had a procedure done today and was discharged.  has been unable to urinate since then. States this happened previously after a procedure.  last urinated around 1000am this morning.

## 2022-04-27 NOTE — DISCHARGE INSTRUCTIONS
Call urology this morning for follow-up in few days for Bird catheter removal.  Return if any new, worsening or concerning symptoms

## 2022-04-27 NOTE — ED PROVIDER NOTES
[facility] Emergency Department Provider Note                     PCP:                Timothy Mckeon MD               Age: 80 y.o. Sex: M         No diagnosis found. MDM  Number of Diagnoses or Management Options  Diagnosis management comments: We will place Bird catheter and evaluate renal function. 1:38 AM  Patient improved with Bird catheter placement. 600 cc return initially. Creatinine normal.  Urinalysis negative for infection. Will discharge home with a leg bag to use during the day.   He will follow-up with urology once again in a few days for Bird catheter removal.       Amount and/or Complexity of Data Reviewed  Clinical lab tests: ordered and reviewed (Results for orders placed or performed during the hospital encounter of 04/27/22  -CBC WITH AUTOMATED DIFF:        Result                      Value             Ref Range           WBC                         9.0               4.3 - 11.1 K*       RBC                         2.99 (L)          4.23 - 5.6 M*       HGB                         10.5 (L)          13.6 - 17.2 *       HCT                         32.0 (L)          41.1 - 50.3 %       MCV                         107.0 (H)         79.6 - 97.8 *       MCH                         35.1 (H)          26.1 - 32.9 *       MCHC                        32.8              31.4 - 35.0 *       RDW                         14.9 (H)          11.9 - 14.6 %       PLATELET                    251               150 - 450 K/*       MPV                         12.4 (H)          9.4 - 12.3 FL       ABSOLUTE NRBC               0.00              0.0 - 0.2 K/*       DF                          AUTOMATED                             NEUTROPHILS                 90 (H)            43 - 78 %           LYMPHOCYTES                 4 (L)             13 - 44 %           MONOCYTES                   5                 4.0 - 12.0 %        EOSINOPHILS                 0 (L)             0.5 - 7.8 %         BASOPHILS 0                 0.0 - 2.0 %         IMMATURE GRANULOCYTES       0                 0.0 - 5.0 %         ABS. NEUTROPHILS            8.1               1.7 - 8.2 K/*       ABS. LYMPHOCYTES            0.4 (L)           0.5 - 4.6 K/*       ABS. MONOCYTES              0.4               0.1 - 1.3 K/*       ABS. EOSINOPHILS            0.0               0.0 - 0.8 K/*       ABS. BASOPHILS              0.0               0.0 - 0.2 K/*       ABS. IMM. GRANS.            0.0               0.0 - 0.5 K/*  -METABOLIC PANEL, COMPREHENSIVE:        Result                      Value             Ref Range           Sodium                      141               136 - 145 mm*       Potassium                   4.2               3.5 - 5.1 mm*       Chloride                    105               98 - 107 mmo*       CO2                         27                21 - 32 mmol*       Anion gap                   9                 7 - 16 mmol/L       Glucose                     152 (H)           65 - 100 mg/*       BUN                         16                8 - 23 MG/DL        Creatinine                  0.80              0.8 - 1.5 MG*       GFR est AA                  >60               >60 ml/min/1*       GFR est non-AA              >60               >60 ml/min/1*       Calcium                     9.3               8.3 - 10.4 M*       Bilirubin, total            0.5               0.2 - 1.1 MG*       ALT (SGPT)                  26                12 - 65 U/L         AST (SGOT)                  15                15 - 37 U/L         Alk.  phosphatase            136               50 - 136 U/L        Protein, total              7.7               6.3 - 8.2 g/*       Albumin                     3.2               3.2 - 4.6 g/*       Globulin                    4.5 (H)           2.3 - 3.5 g/*       A-G Ratio                   0.7 (L)           1.2 - 3.5      -URINALYSIS W/ RFLX MICROSCOPIC:        Result                      Value Ref Range           Color                       YELLOW                                Appearance                  CLEAR                                 Specific gravity            1.015             1.001 - 1.02*       pH (UA)                     7.5               5.0 - 9.0           Protein                     Negative          NEG mg/dL           Glucose                     Negative          mg/dL               Ketone                      Negative          NEG mg/dL           Bilirubin                   Negative          NEG                 Blood                       Negative          NEG                 Urobilinogen                0.2               0.2 - 1.0 EU*       Nitrites                    Negative          NEG                 Leukocyte Esterase          Negative          NEG            )  Obtain history from someone other than the patient: yes  Review and summarize past medical records: yes    Risk of Complications, Morbidity, and/or Mortality  Presenting problems: low  Diagnostic procedures: minimal  Management options: low    Patient Progress  Patient progress: improved      Orders Placed This Encounter    CBC WITH AUTOMATED DIFF    METABOLIC PANEL,COMP.    URINALYSIS    [address], IF UNABLE TO Mathew Denise is a 80 y.o. male who presents to the Emergency Department with chief complaint of    Chief Complaint   Patient presents with    Urinary Retention      HPI 61-year-old male presents with urinary retention following surgery this morning around 10 AM.. He complains of inability to urinate and a fullness in his suprapubic area/bladder area. This is happened before following general anesthesia. He denies dysuria urgency or frequency. He denies hematuria. No fevers or chills. He is due for repeat PSA in June and follow-up with urology at that time    Review of Systems   Constitutional: Negative for chills and fever. Gastrointestinal: Positive for abdominal pain. Negative for nausea and vomiting. Endocrine: Negative for polydipsia and polyuria. Genitourinary: Negative for dysuria, frequency, hematuria and urgency. Musculoskeletal: Negative for back pain and myalgias. All other systems reviewed and are negative. Past Medical History:   Diagnosis Date    Cancer [facility])     throat, 2012    [facility] obstructive [facility] disease [facility] [address]     Hypercholesterolemia     Stroke ([facility] [address]     6/2019, tube feed, aspiration R weakness,         Past Surgical History:   Procedure Laterality Date    HX COLONOSCOPY      HX ORTHOPAEDIC Right 2019    hip replacement    HX ORTHOPAEDIC Right 01/2019    jaw frx    HX ORTHOPAEDIC Right 07/2020    fracture repair of femur/hip       Family History   Problem Relation Age of Onset    Lupus Mother     No Known Problems Father     Cancer Sister         esophageal    No Known Problems Brother     No Known Problems Sister     No Known Problems Brother     No Known Problems Brother            Social Connections:     Frequency of Communication with Friends and Family: Not on file    Frequency of Social Gatherings with Friends and Family: Not on file    Attends Yarsani Services: Not on file    Active Member of Clubs or Organizations: Not on file    Attends Club or Organization Meetings: Not on file    Marital Status: Not on file        No Known Allergies     Vitals signs and nursing note reviewed. Patient Vitals for the past 4 hrs:   Temp Pulse Resp BP SpO2   04/27/22 0045    132/71    04/27/22 0043     94 %   04/26/22 2343 97.3 °F (36.3 °C) 96 18 113/69 97 %          Physical Exam  Vitals and nursing note reviewed. Constitutional:       Appearance: He is ill-appearing ( Chronically but not acutely ill-appearing). HENT:      Mouth/Throat:      Mouth: Mucous membranes are moist.   Eyes:      Conjunctiva/sclera: Conjunctivae normal.   Cardiovascular:      Rate and Rhythm: Normal rate and regular rhythm.       Heart sounds: Normal heart sounds. Pulmonary:      Effort: Pulmonary effort is normal.      Breath sounds: Normal breath sounds. Abdominal:      General: There is distension ( Suprapubic distention prior). Palpations: Abdomen is soft. Tenderness: There is abdominal tenderness ( Suprapubic tenderness). There is no guarding or rebound. Musculoskeletal:         General: No swelling. Right lower leg: No edema. Left lower leg: No edema. Skin:     General: Skin is warm and dry. Neurological:      Mental Status: He is alert and oriented to person, place, and time. Procedures    Results for orders placed or performed during the hospital encounter of 04/27/22   CBC WITH AUTOMATED DIFF   Result Value Ref Range    WBC 9.0 4.3 - 11.1 K/uL    RBC 2.99 (L) 4.23 - 5.6 M/uL    HGB 10.5 (L) 13.6 - 17.2 g/dL    HCT 32.0 (L) 41.1 - 50.3 %    .0 (H) 79.6 - 97.8 FL    MCH 35.1 (H) 26.1 - 32.9 PG    MCHC 32.8 31.4 - 35.0 g/dL    RDW 14.9 (H) 11.9 - 14.6 %    PLATELET 750 296 - 464 K/uL    MPV 12.4 (H) 9.4 - 12.3 FL    ABSOLUTE NRBC 0.00 0.0 - 0.2 K/uL    DF AUTOMATED      NEUTROPHILS 90 (H) 43 - 78 %    LYMPHOCYTES 4 (L) 13 - 44 %    MONOCYTES 5 4.0 - 12.0 %    EOSINOPHILS 0 (L) 0.5 - 7.8 %    BASOPHILS 0 0.0 - 2.0 %    IMMATURE GRANULOCYTES 0 0.0 - 5.0 %    ABS. NEUTROPHILS 8.1 1.7 - 8.2 K/UL    ABS. LYMPHOCYTES 0.4 (L) 0.5 - 4.6 K/UL    ABS. MONOCYTES 0.4 0.1 - 1.3 K/UL    ABS. EOSINOPHILS 0.0 0.0 - 0.8 K/UL    ABS. BASOPHILS 0.0 0.0 - 0.2 K/UL    ABS. IMM.  GRANS. 0.0 0.0 - 0.5 K/UL   METABOLIC PANEL, COMPREHENSIVE   Result Value Ref Range    Sodium 141 136 - 145 mmol/L    Potassium 4.2 3.5 - 5.1 mmol/L    Chloride 105 98 - 107 mmol/L    CO2 27 21 - 32 mmol/L    Anion gap 9 7 - 16 mmol/L    Glucose 152 (H) 65 - 100 mg/dL    BUN 16 8 - 23 MG/DL    Creatinine 0.80 0.8 - 1.5 MG/DL    GFR est AA >60 >60 ml/min/1.73m2    GFR est non-AA >60 >60 ml/min/1.73m2    Calcium 9.3 8.3 - 10.4 MG/DL Bilirubin, total 0.5 0.2 - 1.1 MG/DL    ALT (SGPT) 26 12 - 65 U/L    AST (SGOT) 15 15 - 37 U/L    Alk. phosphatase 136 50 - 136 U/L    Protein, total 7.7 6.3 - 8.2 g/dL    Albumin 3.2 3.2 - 4.6 g/dL    Globulin 4.5 (H) 2.3 - 3.5 g/dL    A-G Ratio 0.7 (L) 1.2 - 3.5     URINALYSIS W/ RFLX MICROSCOPIC   Result Value Ref Range    Color YELLOW      Appearance CLEAR      Specific gravity 1.015 1.001 - 1.023      pH (UA) 7.5 5.0 - 9.0      Protein Negative NEG mg/dL    Glucose Negative mg/dL    Ketone Negative NEG mg/dL    Bilirubin Negative NEG      Blood Negative NEG      Urobilinogen 0.2 0.2 - 1.0 EU/dL    Nitrites Negative NEG      Leukocyte Esterase Negative NEG          No orders to display         Voice dictation software was used during the making of this note. This software is not perfect and grammatical and other typographical errors may be present. This note has not been completely proofread for errors.

## 2022-05-06 ENCOUNTER — HOSPITAL ENCOUNTER (EMERGENCY)
Age: 72
Discharge: HOME | End: 2022-05-06
Attending: STUDENT IN AN ORGANIZED HEALTH CARE EDUCATION/TRAINING PROGRAM
Payer: MEDICARE

## 2022-05-06 VITALS
SYSTOLIC BLOOD PRESSURE: 150 MMHG | DIASTOLIC BLOOD PRESSURE: 87 MMHG | TEMPERATURE: 98 F | BODY MASS INDEX: 22.4 KG/M2 | HEART RATE: 103 BPM | HEIGHT: 71 IN | WEIGHT: 160 LBS | OXYGEN SATURATION: 96 % | RESPIRATION RATE: 14 BRPM

## 2022-05-06 DIAGNOSIS — R33.9 URINARY RETENTION: Primary | ICD-10-CM

## 2022-05-06 LAB
APPEARANCE UR: NORMAL
BILIRUB UR QL: NEGATIVE
COLOR UR: YELLOW
GLUCOSE UR STRIP.AUTO-MCNC: NEGATIVE MG/DL
HGB UR QL STRIP: NEGATIVE
KETONES UR QL STRIP.AUTO: NEGATIVE MG/DL
LEUKOCYTE ESTERASE UR QL STRIP.AUTO: NEGATIVE
NITRITE UR QL STRIP.AUTO: NEGATIVE
PH UR STRIP: 8 [PH] (ref 5–9)
PROT UR STRIP-MCNC: NEGATIVE MG/DL
SP GR UR REFRACTOMETRY: 1.01 (ref 1–1.02)
UROBILINOGEN UR QL STRIP.AUTO: 0.2 EU/DL (ref 0.2–1)

## 2022-05-06 PROCEDURE — 51798 US URINE CAPACITY MEASURE: CPT

## 2022-05-06 PROCEDURE — 81003 URINALYSIS AUTO W/O SCOPE: CPT

## 2022-05-06 PROCEDURE — 51702 INSERT TEMP BLADDER CATH: CPT

## 2022-05-06 PROCEDURE — 99283 EMERGENCY DEPT VISIT LOW MDM: CPT

## 2022-05-06 RX ORDER — TAMSULOSIN HYDROCHLORIDE 0.4 MG/1
0.4 CAPSULE ORAL DAILY
Qty: 15 CAPSULE | Refills: 0 | Status: SHIPPED | OUTPATIENT
Start: 2022-05-06 | End: 2022-05-18

## 2022-05-06 NOTE — ED TRIAGE NOTES
Pt arrived via POV c/o not able to urinate since 0900 this AM. Pt states that he had a catheter removed Wednesday for retention.

## 2022-05-07 NOTE — DISCHARGE INSTRUCTIONS
Take Flomax daily. Leave Bird catheter in place until seen by urology. Call urologist early next week for follow-up appointment. Return to the ER for worsening or worrisome symptoms.

## 2022-05-07 NOTE — ED NOTES
I have reviewed discharge instructions with the patient. The patient verbalized understanding. Patient left ED via Discharge Method: wheelchair to Home with (Spouse). Opportunity for questions and clarification provided. Patient given 1 scripts. To continue your aftercare when you leave the hospital, you may receive an automated call from our care team to check in on how you are doing. This is a free service and part of our promise to provide the best care and service to meet your aftercare needs.  If you have questions, or wish to unsubscribe from this service please call [phone]. Thank you for Choosing our [facility] Emergency Department.

## 2022-05-07 NOTE — ED PROVIDER NOTES
71-year-old male presents to the emergency department with concern for urinary retention. Recently had surgical procedure to his right jaw and had a Bird catheter placed. This was removed and patient was urinating normally until today when he last urinated early this morning unable to urinate is evidence of a came to the ER for evaluation. Denies fever, chills, nausea or vomiting. Denies abdominal pain. Denies history of enlarged prostate or difficulty with urinating in the past.  Is established with urology.            Past Medical History:   Diagnosis Date    Cancer [facility])     throat, 2012    Chronic obstructive pulmonary disease ([facility]     Hypercholesterolemia     Stroke ([facility] [address]     6/2019, tube feed, aspiration R weakness,        Past Surgical History:   Procedure Laterality Date    HX COLONOSCOPY      HX ORTHOPAEDIC Right 2019    hip replacement    HX ORTHOPAEDIC Right 01/2019    jaw frx    HX ORTHOPAEDIC Right 07/2020    fracture repair of femur/hip         Family History:   Problem Relation Age of Onset    Lupus Mother     No Known Problems Father     Cancer Sister         esophageal    No Known Problems Brother     No Known Problems Sister     No Known Problems Brother     No Known Problems Brother        Social History     Socioeconomic History    Marital status:      Spouse name: Not on file    Number of children: Not on file    Years of education: Not on file    Highest education level: Not on file   Occupational History    Occupation:    Tobacco Use    Smoking status: Current Every Day Smoker     Packs/day: 0.50     Years: 3.00     Pack years: 1.50     Types: Cigarettes    Smokeless tobacco: Never Used    Tobacco comment: 8-10 per day   Vaping Use    Vaping Use: Never used   Substance and Sexual Activity    Alcohol use: Not Currently    Drug use: Never    Sexual activity: Not on file   Other Topics Concern    Not on file   Social History Narrative    Not on file     Social Determinants of Health     Financial Resource Strain:     Difficulty of Paying Living Expenses: Not on file   Food Insecurity:     Worried About Running Out of Food in the Last Year: Not on file    Berenice of Food in the Last Year: Not on file   Transportation Needs:     Lack of Transportation (Medical): Not on file    Lack of Transportation (Non-Medical): Not on file   Physical Activity:     Days of Exercise per Week: Not on file    Minutes of Exercise per Session: Not on file   Stress:     Feeling of Stress : Not on file   Social Connections:     Frequency of Communication with Friends and Family: Not on file    Frequency of Social Gatherings with Friends and Family: Not on file    Attends Christian Services: Not on file    Active Member of [address] [place] [address] [employer] or Organizations: Not on file    Attends Club or Organization Meetings: Not on file    Marital Status: Not on file   Intimate Partner Violence:     Fear of Current or Ex-Partner: Not on file    Emotionally Abused: Not on file    Physically Abused: Not on file    Sexually Abused: Not on file   Housing Stability:     Unable to Pay for Housing in the Last Year: Not on file    Number of [place] in the Last Year: Not on file    Unstable Housing in the Last Year: Not on file         ALLERGIES: Patient has no known allergies. Review of Systems   Constitutional: Negative for chills and fever. HENT: Negative for congestion and sore throat. Eyes: Negative for visual disturbance. Respiratory: Negative for cough and shortness of breath. Cardiovascular: Negative for chest pain. Gastrointestinal: Negative for abdominal pain, diarrhea, nausea and vomiting. Endocrine: Negative for polyuria. Genitourinary: Positive for difficulty urinating. Negative for dysuria. Musculoskeletal: Negative for neck pain and neck stiffness. Skin: Negative for rash. Neurological: Negative for weakness and headaches.    All other systems reviewed and are negative. Vitals:    05/06/22 1807   BP: (!) 145/99   Pulse: (!) 103   Resp: 14   Temp: 98 °F (36.7 °C)   SpO2: 96%   Weight: 72.6 kg (160 lb)   Height: 5' 11\" (1.803 m)            Physical Exam  Vitals and nursing note reviewed. Constitutional:       Appearance: Normal appearance. HENT:      Head: Normocephalic and atraumatic. Comments: Postsurgical graft to the right jaw, without evidence of erythema. Nose: Nose normal.      Mouth/Throat:      Mouth: Mucous membranes are moist.   Eyes:      Extraocular Movements: Extraocular movements intact. Cardiovascular:      Rate and Rhythm: Normal rate and regular rhythm. Heart sounds: Normal heart sounds. Pulmonary:      Effort: Pulmonary effort is normal.      Breath sounds: Normal breath sounds. No wheezing, rhonchi or rales. Abdominal:      General: Abdomen is flat. Palpations: Abdomen is soft. Tenderness: There is no abdominal tenderness. There is no guarding. Musculoskeletal:         General: Normal range of motion. Cervical back: Normal range of motion. Skin:     General: Skin is warm and dry. Neurological:      General: No focal deficit present. Mental Status: He is alert and oriented to person, place, and time. Psychiatric:         Mood and Affect: Mood normal.          MDM  Number of Diagnoses or Management Options  Urinary retention  Diagnosis management comments: 71-year-old male presents ER with urinary retention. Bird catheter placed with immediate relief of his discomfort. Initially patient was tachycardic on arrival but heart rate normalized prior to my evaluation of patient. Patient is in no discomfort. Urinalysis without evidence of urinary tract infection. Patient will be placed on Flomax and will follow up with urology early next week. Patient is established will call to make this appointment. Given strict return precautions.   Patient and wife voiced understanding and agreement with this plan.        Amount and/or Complexity of Data Reviewed  Clinical lab tests: ordered and reviewed    Risk of Complications, Morbidity, and/or Mortality  Presenting problems: moderate  Diagnostic procedures: moderate  Management options: moderate           Procedures

## 2022-05-09 LAB
BILIRUB UR QL: NEGATIVE
GLUCOSE UR QL STRIP.AUTO: NEGATIVE MG/DL
KETONES UR-MCNC: NEGATIVE MG/DL
LEUKOCYTE ESTERASE UR QL STRIP: NEGATIVE
NITRITE UR QL: NEGATIVE
PH UR: 7.5 [PH] (ref 5–9)
PROT UR QL: NEGATIVE MG/DL
RBC # UR STRIP: NEGATIVE /UL
SERVICE CMNT-IMP: NORMAL
SP GR UR: 1.01 (ref 1–1.02)
UROBILINOGEN UR QL: 0.2 EU/DL (ref 0.2–1)

## 2022-05-09 NOTE — OP NOTES
Operative Note    Patient: Rodrigue Kurtz  YOB: 1950  MRN: [id number]    Date of Procedure: 4/26/2022     Pre-Op Diagnosis: Right mandible wound    Post-Op Diagnosis: Same    Procedure(s):  Division and inset of right pectoralis musculocutaneous flap    Prior to the procedure the patient was met in holding. Once again a discussion of the risks, benefits and alternatives was conducted including but not limited to bleeding, infection, failure of the surgery, need for further procedures, disappointing or unacceptable cosmesis, damage to adjacent structures was conducted. The patient again affirmed understanding and consent. The patient was marked in the upright and relaxed position. Patient brought to the OR, surgical timeout performed and anesthesia induced. Patient positioned and prepped and draped. Attention turned to flap. Flap pedicle (pectoralis branch of thora-acromial artery) was clamped and distal perfusion assessed. Skin paddle remained well vascularized. Pedicle divided at the chest and donor defect closed with fat fascial 3-0 vicryl, deep dermal 3-0 vicryl and staples. Soft tissue underlying the skin paddle was then thinned. Inferior portion of the wound elevated to facilitate inset. Wound irrigated copiously with 2 L NS. Inferior protion of flap then inset with deep dermal and fascial 3-0 monocryl and 4-0 nylon at the skin. Dressed with xeroform and gauze. Surgeon(s):  Mirlande Castro MD    Surgical Assistant: None    Anesthesia: General     Estimated Blood Loss (mL): 20 ml    Complications: None immediate    Specimens: * No specimens in log *     Implants: * No implants in log *    Drains:   [REMOVED] Maurizio-Thompson Drain 04/26/22 Right Neck (Removed)   Site Assessment Clean, dry, & intact 04/26/22 1621   Dressing Status Clean, dry, & intact 04/26/22 1621   Status Patent; Charged;Draining 04/26/22 1621   Drainage Color Sanguinous 04/26/22 1621       [REMOVED] Feeding Tube 04/22/22 (Removed)   Site Assessment Clean, dry, & intact 04/26/22 0006

## 2022-05-31 LAB
ACID FAST STN SPEC: NEGATIVE
MYCOBACTERIUM SPEC QL CULT: NEGATIVE
SPECIMEN PREPARATION: NORMAL
SPECIMEN SOURCE: NORMAL

## 2022-06-07 ENCOUNTER — OFFICE VISIT (OUTPATIENT)
Dept: INTERNAL MEDICINE CLINIC | Facility: CLINIC | Age: 72
End: 2022-06-07
Payer: MEDICARE

## 2022-06-07 VITALS
DIASTOLIC BLOOD PRESSURE: 62 MMHG | SYSTOLIC BLOOD PRESSURE: 107 MMHG | HEIGHT: 71 IN | BODY MASS INDEX: 21.56 KG/M2 | WEIGHT: 154 LBS | HEART RATE: 83 BPM | OXYGEN SATURATION: 99 %

## 2022-06-07 DIAGNOSIS — J44.9 CHRONIC OBSTRUCTIVE PULMONARY DISEASE, UNSPECIFIED COPD TYPE (HCC): ICD-10-CM

## 2022-06-07 DIAGNOSIS — E78.00 HYPERCHOLESTEROLEMIA: ICD-10-CM

## 2022-06-07 DIAGNOSIS — R47.01 EXPRESSIVE APHASIA: ICD-10-CM

## 2022-06-07 DIAGNOSIS — G81.91 RIGHT HEMIPARESIS (HCC): ICD-10-CM

## 2022-06-07 DIAGNOSIS — R97.20 ELEVATED PSA MEASUREMENT: Primary | ICD-10-CM

## 2022-06-07 DIAGNOSIS — R97.20 ELEVATED PSA MEASUREMENT: ICD-10-CM

## 2022-06-07 PROCEDURE — 99214 OFFICE O/P EST MOD 30 MIN: CPT | Performed by: INTERNAL MEDICINE

## 2022-06-07 PROCEDURE — 1123F ACP DISCUSS/DSCN MKR DOCD: CPT | Performed by: INTERNAL MEDICINE

## 2022-06-07 RX ORDER — TAMSULOSIN HYDROCHLORIDE 0.4 MG/1
0.4 CAPSULE ORAL DAILY
COMMUNITY
Start: 2022-05-18

## 2022-06-07 ASSESSMENT — ENCOUNTER SYMPTOMS: SHORTNESS OF BREATH: 0

## 2022-06-07 NOTE — PROGRESS NOTES
Guanaco Wood M.D. Internal Medicine  [address] , [address]   Office : [phone]  Fax : [phone]    Chief Complaint   Patient presents with    Hyperlipidemia       History of Present Illness:  Sushma Etienne is a 80 y.o. male. HPI    Hyperlipidemia  Patient is in for follow-up for hyperlipidemia. Diet and Lifestyle: generally follows a low fat low cholesterol diet. Risk factors for vascular disease consist of hyperlipidemia and O2 use. Last LDL was   Lab Results   Component Value Date    LDLCALC 34 03/16/2022   . Last ALT was   Lab Results   Component Value Date    ALT 26 04/26/2022   . No muscle aches. COPD:  The patient is being seen for follow up of COPD. Oxygen: he currently is not on home oxygen therapy. Symptoms: no limitations. Patient uses 1 pillows at night. Patient admits to smoking cigarettes. Right hemiparesis after CVA in 2019  No Bleeding issues on aspirin and plavix      Carotid Artery Disease  Saw Vascular surgeon in past but CEA contraindicated due previous RT and mandible surgery    LUTS/Elevated PSA  Started on Flomax by urology.   Urology deferred repeat PSA to PCP    Hepatitis C  Patient declined to follow up with GI    Past Medical History:  Past Medical History:   Diagnosis Date    Cancer ([address]     throat, 2012    Chronic obstructive pulmonary disease ([address]     Hypercholesterolemia     Stroke ([address]     6/2019, tube feed, aspiration R weakness,      Past Surgical History:  Past Surgical History:   Procedure Laterality Date    COLONOSCOPY      ORTHOPEDIC SURGERY Right 07/2020    fracture repair of femur/hip    ORTHOPEDIC SURGERY Right 01/2019    jaw frx    ORTHOPEDIC SURGERY Right 2019    hip replacement     Allergies:   No Known Allergies  Medications:   Current Outpatient Medications   Medication Sig Dispense Refill    tamsulosin (FLOMAX) 0.4 mg capsule Take 0.4 mg by mouth daily      aspirin 81 MG EC tablet Take by mouth daily      atorvastatin (LIPITOR) 40 MG tablet Take 40 mg by mouth daily      clopidogrel (PLAVIX) 75 MG tablet Take 75 mg by mouth daily      cephALEXin (KEFLEX) 500 MG capsule Take 500 mg by mouth 4 times daily (Patient not taking: Reported on 2022)      diazePAM (VALIUM) 2 MG tablet Take 2 mg by mouth every 6 hours as needed. (Patient not taking: Reported on 2022)      gabapentin (NEURONTIN) 300 MG capsule Take 300 mg by mouth 3 times daily. (Patient not taking: Reported on 2022)       No current facility-administered medications for this visit. Social History:  Social History     Tobacco Use    Smoking status: Current Every Day Smoker     Packs/day: 0.50    Smokeless tobacco: Never Used    Tobacco comment: Quit smokin-10 per day   Substance Use Topics    Alcohol use: Not Currently     Family History  Family History   Problem Relation Age of Onset    No Known Problems Brother     No Known Problems Brother     No Known Problems Sister     Cancer Sister         esophageal    No Known Problems Father     Lupus Mother     No Known Problems Brother        Review of Systems   Constitutional: Negative for appetite change, chills and fever. Respiratory: Negative for shortness of breath. Cardiovascular: Negative for chest pain. Genitourinary: Positive for difficulty urinating. Neurological: Positive for weakness. Psychiatric/Behavioral: Negative for confusion. Vital Signs  /62   Pulse 83   Ht 5' 11\" (1.803 m)   Wt 154 lb (69.9 kg)   SpO2 99%   BMI 21.48 kg/m²   Body mass index is 21.48 kg/m². Physical Exam  Vitals reviewed. Constitutional:       General: He is not in acute distress. Appearance: Normal appearance. He is not ill-appearing or toxic-appearing. HENT:      Head: Normocephalic and atraumatic. Eyes:      General: No scleral icterus.      Extraocular Movements: Extraocular movements intact. Conjunctiva/sclera: Conjunctivae normal.   Cardiovascular:      Rate and Rhythm: Normal rate and regular rhythm. Heart sounds: Normal heart sounds. No murmur heard. Pulmonary:      Effort: Pulmonary effort is normal.      Breath sounds: Normal breath sounds. Abdominal:      General: Bowel sounds are normal.   Musculoskeletal:         General: No swelling. Skin:     Coloration: Skin is not jaundiced. Findings: No rash. Neurological:      General: No focal deficit present. Mental Status: He is alert. Mental status is at baseline. Cranial Nerves: No cranial nerve deficit. Motor: Weakness present. Gait: Gait abnormal.   Psychiatric:         Mood and Affect: Mood normal.         Behavior: Behavior normal.           Assessment/Plan:  Luke Ayala was seen today for hyperlipidemia. Diagnoses and all orders for this visit:    Elevated PSA measurement  -     PSA, Total and Free; Future    Right hemiparesis (HCC)    Chronic obstructive pulmonary disease, unspecified COPD type ([facility]    Expressive aphasia    Hypercholesterolemia      Continue all medication    Return in about 6 months (around 12/7/2022), or if symptoms worsen or fail to improve.   __  iPnky Beck M.D.

## 2022-06-08 LAB
PSA FREE MFR SERPL: 17.8 %
PSA FREE SERPL-MCNC: 0.8 NG/ML
PSA SERPL-MCNC: 4.5 NG/ML

## 2022-11-21 RX ORDER — TAMSULOSIN HYDROCHLORIDE 0.4 MG/1
CAPSULE ORAL
Qty: 90 CAPSULE | Refills: 3 | Status: SHIPPED | OUTPATIENT
Start: 2022-11-21

## 2022-12-02 RX ORDER — ATORVASTATIN CALCIUM 40 MG/1
TABLET, FILM COATED ORAL
Qty: 90 TABLET | Refills: 0 | Status: SHIPPED | OUTPATIENT
Start: 2022-12-02

## 2022-12-05 RX ORDER — CLOPIDOGREL BISULFATE 75 MG/1
TABLET ORAL
Qty: 90 TABLET | OUTPATIENT
Start: 2022-12-05

## 2022-12-19 ENCOUNTER — OFFICE VISIT (OUTPATIENT)
Dept: INTERNAL MEDICINE CLINIC | Facility: CLINIC | Age: 72
End: 2022-12-19
Payer: MEDICARE

## 2022-12-19 VITALS
RESPIRATION RATE: 12 BRPM | WEIGHT: 159.8 LBS | HEART RATE: 66 BPM | HEIGHT: 71 IN | OXYGEN SATURATION: 94 % | SYSTOLIC BLOOD PRESSURE: 114 MMHG | BODY MASS INDEX: 22.37 KG/M2 | DIASTOLIC BLOOD PRESSURE: 72 MMHG | TEMPERATURE: 97.9 F

## 2022-12-19 DIAGNOSIS — I65.21 CAROTID ARTERY STENOSIS, SYMPTOMATIC, RIGHT: ICD-10-CM

## 2022-12-19 DIAGNOSIS — I65.22 OCCLUSION AND STENOSIS OF LEFT CAROTID ARTERY: ICD-10-CM

## 2022-12-19 DIAGNOSIS — J44.9 CHRONIC OBSTRUCTIVE PULMONARY DISEASE, UNSPECIFIED COPD TYPE (HCC): ICD-10-CM

## 2022-12-19 DIAGNOSIS — B37.0 ORAL PHARYNGEAL CANDIDIASIS: ICD-10-CM

## 2022-12-19 DIAGNOSIS — E78.49 OTHER HYPERLIPIDEMIA: Primary | ICD-10-CM

## 2022-12-19 PROCEDURE — 99214 OFFICE O/P EST MOD 30 MIN: CPT | Performed by: INTERNAL MEDICINE

## 2022-12-19 PROCEDURE — 1123F ACP DISCUSS/DSCN MKR DOCD: CPT | Performed by: INTERNAL MEDICINE

## 2022-12-19 RX ORDER — CLOPIDOGREL BISULFATE 75 MG/1
75 TABLET ORAL DAILY
Qty: 90 TABLET | Refills: 0 | Status: SHIPPED | OUTPATIENT
Start: 2022-12-19

## 2022-12-19 RX ORDER — ATORVASTATIN CALCIUM 40 MG/1
TABLET, FILM COATED ORAL
Qty: 90 TABLET | Refills: 0 | Status: SHIPPED | OUTPATIENT
Start: 2022-12-19

## 2022-12-19 ASSESSMENT — ANXIETY QUESTIONNAIRES
6. BECOMING EASILY ANNOYED OR IRRITABLE: 0
GAD7 TOTAL SCORE: 0
5. BEING SO RESTLESS THAT IT IS HARD TO SIT STILL: 0
7. FEELING AFRAID AS IF SOMETHING AWFUL MIGHT HAPPEN: 0
1. FEELING NERVOUS, ANXIOUS, OR ON EDGE: 0
2. NOT BEING ABLE TO STOP OR CONTROL WORRYING: 0
IF YOU CHECKED OFF ANY PROBLEMS ON THIS QUESTIONNAIRE, HOW DIFFICULT HAVE THESE PROBLEMS MADE IT FOR YOU TO DO YOUR WORK, TAKE CARE OF THINGS AT HOME, OR GET ALONG WITH OTHER PEOPLE: NOT DIFFICULT AT ALL
4. TROUBLE RELAXING: 0
3. WORRYING TOO MUCH ABOUT DIFFERENT THINGS: 0

## 2022-12-19 ASSESSMENT — PATIENT HEALTH QUESTIONNAIRE - PHQ9
2. FEELING DOWN, DEPRESSED OR HOPELESS: 0
SUM OF ALL RESPONSES TO PHQ QUESTIONS 1-9: 0
SUM OF ALL RESPONSES TO PHQ9 QUESTIONS 1 & 2: 0
SUM OF ALL RESPONSES TO PHQ QUESTIONS 1-9: 0
1. LITTLE INTEREST OR PLEASURE IN DOING THINGS: 0

## 2022-12-19 ASSESSMENT — ENCOUNTER SYMPTOMS: SHORTNESS OF BREATH: 0

## 2022-12-19 NOTE — PROGRESS NOTES
Christin Bergeron M.D. Internal Medicine  [address] [place] [address]  Office : [phone]  Fax : [phone]    Chief Complaint   Patient presents with    Cholesterol Problem         History of Present Illness:  Shamar Chacon is a 71 y.o. male. HPI      Hyperlipidemia  Patient is in for follow-up for hyperlipidemia. Diet and Lifestyle: generally follows a low fat low cholesterol diet. Risk factors for vascular disease consist of hyperlipidemia. Last LDL was   Lab Results   Component Value Date    LDLCALC 34 03/16/2022   . Last ALT was   Lab Results   Component Value Date/Time    ALT 26 04/26/2022 11:50 PM   .  No muscle aches. COPD:  The patient is being seen for follow up of COPD. Oxygen: he currently is not on home oxygen therapy. Symptoms: no limitations. Patient uses 1 pillows at night. Patient admits to smoking cigarettes, only 5 per day. Moving back to [address] [place] [address] Dr gabriel has medical marijuana      Right hemiparesis after CVA in 2019  No Bleeding issues on aspirin and plavix      Carotid Artery Disease  Saw Vascular surgeon in past but CEA contraindicated due previous RT and mandible surgery    LUTS/Elevated PSA  Started on Flomax by urology.   Urology deferred repeat PSA to PCP  Lab Results   Component Value Date    PSA 4.5 (H) 06/07/2022    PSA 4.3 (H) 03/16/2022    PSA 3.4 10/20/2020         Hepatitis C  Patient declined to follow up with GI here but is planning to in [address] [place] [address]     Past Medical History:  Past Medical History:   Diagnosis Date    Cancer ([address] 06/25/2012    throat-underwent chemo and radiaiton    Cancer ([address]     throat, 2012    Chronic obstructive pulmonary disease (HCC)     COPD (chronic obstructive pulmonary disease) (HCC)     CVA (cerebral vascular accident) ([address]     right sided residual- uses a walker for ambulation    Hypercholesterolemia     Stroke ([address]     6/2019, tube feed, aspiration R weakness,      Past Surgical History:  Past Surgical History:   Procedure Laterality Date    COLONOSCOPY      DENTAL SURGERY Bilateral 2018    FEMUR FRACTURE SURGERY Right 2020    FEMUR OPEN REDUCTION INTERNAL FIXATION performed by Jessica Ruggiero DO at [facility]      right thumb    MOUTH BIOPSY  2012    right lateral pharynx biopsy. ORTHOPEDIC SURGERY Right 2020    fracture repair of femur/hip    ORTHOPEDIC SURGERY Right 2019    jaw frx    ORTHOPEDIC SURGERY Right 2019    hip replacement     Allergies:   No Known Allergies  Medications:   Current Outpatient Medications   Medication Sig Dispense Refill    atorvastatin (LIPITOR) 40 MG tablet TAKE 1 TABLET BY MOUTH DAILY 90 tablet 0    clopidogrel (PLAVIX) 75 MG tablet Take 1 tablet by mouth daily 90 tablet 0    tamsulosin (FLOMAX) 0.4 MG capsule TAKE 1 CAPSULE BY MOUTH DAILY 90 capsule 3    aspirin 81 MG chewable tablet 81 mg by Per G Tube route nightly       No current facility-administered medications for this visit. Facility-Administered Medications Ordered in Other Visits   Medication Dose Route Frequency Provider Last Rate Last Admin    filgrastim (NEUPOGEN) injection 300 mcg  300 mcg SubCUTAneous Once Lena Tavarez MD         Social History:  Social History     Tobacco Use    Smoking status: Every Day     Packs/day: 0.50     Types: Cigarettes    Smokeless tobacco: Never    Tobacco comments:     Quit smokin-10 per day   Substance Use Topics    Alcohol use: Not Currently     Comment: occasional     Family History  Family History   Problem Relation Age of Onset    No Known Problems Brother     No Known Problems Brother     No Known Problems Sister     Cancer Sister         esophageal    Lupus Mother     No Known Problems Brother     Cancer Father         colon    Heart Disease Father     Colon Cancer Father 78    Substance Abuse Father         cigerrette smoker & wore oxygen.     Immunodeficiency Brother         HIV    Other Brother         motor vehicle accident. Heart Attack Maternal Grandfather 72    Diabetes Paternal Grandfather     Heart Attack Paternal Grandfather        Review of Systems   Constitutional:  Negative for appetite change, chills, fatigue and fever. Respiratory:  Negative for shortness of breath. Cardiovascular:  Negative for chest pain. Musculoskeletal:  Negative for gait problem. Skin:  Negative for rash. Neurological:  Positive for speech difficulty. Psychiatric/Behavioral:  Negative for dysphoric mood. Vital Signs  /72 (Site: Left Upper Arm, Position: Sitting, Cuff Size: Small Adult)   Pulse 66   Temp 97.9 °F (36.6 °C) (Temporal)   Resp 12   Ht 5' 11\" (1.803 m)   Wt 159 lb 12.8 oz (72.5 kg) Comment: with shoes  SpO2 94% Comment: room air  BMI 22.29 kg/m²   Body mass index is 22.29 kg/m². Physical Exam  Vitals reviewed. Constitutional:       General: He is not in acute distress. Appearance: He is not ill-appearing. HENT:      Head: Normocephalic and atraumatic. Eyes:      General: No scleral icterus. Conjunctiva/sclera: Conjunctivae normal.   Cardiovascular:      Rate and Rhythm: Normal rate and regular rhythm. Heart sounds: Normal heart sounds. No murmur heard. Pulmonary:      Effort: Pulmonary effort is normal.      Breath sounds: Normal breath sounds. Musculoskeletal:         General: No swelling. Skin:     Coloration: Skin is not jaundiced. Findings: No rash. Neurological:      General: No focal deficit present. Mental Status: He is alert. Mental status is at baseline. Cranial Nerves: No cranial nerve deficit. Motor: No weakness. Gait: Gait normal.   Psychiatric:         Mood and Affect: Mood normal.         Behavior: Behavior normal.         Thought Content: Thought content normal.         Judgment: Judgment normal.         Assessment/Plan:  Marquez Joseph was seen today for cholesterol problem.     Diagnoses and all orders for this visit:    Other hyperlipidemia  -     atorvastatin (LIPITOR) 40 MG tablet; TAKE 1 TABLET BY MOUTH DAILY    Occlusion and stenosis of left carotid artery  -     clopidogrel (PLAVIX) 75 MG tablet; Take 1 tablet by mouth daily    Chronic obstructive pulmonary disease, unspecified COPD type ([facility]    Oral pharyngeal candidiasis    Carotid artery stenosis, symptomatic, right  Follow up with physician in [address] [place] [address] Dr    Return if symptoms worsen or fail to improve.   __  Otilio Mendoza M.D.

## 2024-10-10 ENCOUNTER — APPOINTMENT (OUTPATIENT)
Dept: CT IMAGING | Age: 74
End: 2024-10-10
Payer: MEDICARE

## 2024-10-10 ENCOUNTER — HOSPITAL ENCOUNTER (EMERGENCY)
Age: 74
Discharge: HOME | End: 2024-10-10
Attending: STUDENT IN AN ORGANIZED HEALTH CARE EDUCATION/TRAINING PROGRAM
Payer: MEDICARE

## 2024-10-10 VITALS
WEIGHT: 160 LBS | HEART RATE: 85 BPM | SYSTOLIC BLOOD PRESSURE: 161 MMHG | TEMPERATURE: 97.8 F | DIASTOLIC BLOOD PRESSURE: 85 MMHG | RESPIRATION RATE: 24 BRPM | BODY MASS INDEX: 21.67 KG/M2 | OXYGEN SATURATION: 90 % | HEIGHT: 72 IN

## 2024-10-10 DIAGNOSIS — S09.90XA INJURY OF HEAD, INITIAL ENCOUNTER: Primary | ICD-10-CM

## 2024-10-10 DIAGNOSIS — T14.8XXA ABRASION: ICD-10-CM

## 2024-10-10 PROCEDURE — 72125 CT NECK SPINE W/O DYE: CPT

## 2024-10-10 PROCEDURE — 70450 CT HEAD/BRAIN W/O DYE: CPT

## 2024-10-10 PROCEDURE — 99284 EMERGENCY DEPT VISIT MOD MDM: CPT

## 2024-10-10 RX ORDER — FINASTERIDE 5 MG/1
5 TABLET, FILM COATED ORAL DAILY
COMMUNITY

## 2024-10-10 ASSESSMENT — ENCOUNTER SYMPTOMS
VOMITING: 0
BACK PAIN: 0
ABDOMINAL PAIN: 0
NAUSEA: 0
CHEST TIGHTNESS: 0
SORE THROAT: 0
COUGH: 0
WHEEZING: 0
SHORTNESS OF BREATH: 0
DIARRHEA: 0

## 2024-10-10 ASSESSMENT — PAIN - FUNCTIONAL ASSESSMENT: PAIN_FUNCTIONAL_ASSESSMENT: NONE - DENIES PAIN

## 2024-10-10 NOTE — ED PROVIDER NOTES
Chief complaint:  Fall      HPI history provided by the patient and family  Patient is here after a fall this morning.  Family states patient smokes marijuana and smoked joint this morning and then fell down, he has chronic right-sided weakness and numbness from previous neurologic injuries right arm is fairly useless and he has a brace on his right leg and is supposed to use a cane.  Complains of abrasions on the left wrist, right forearm and right side of his head, did hit his head when he fell.  He denies pain anywhere.  Denies headache or neck pain or back pain and denies new arm or leg pain or injuries otherwise.  No chest pain or palpitations or shortness of breath and no lightheadedness or syncope.  No abdominal pain.    Review of Systems   Constitutional:  Negative for chills, diaphoresis, fatigue and fever.   HENT:  Negative for congestion and sore throat.    Respiratory:  Negative for cough, chest tightness, shortness of breath and wheezing.    Cardiovascular:  Negative for chest pain, palpitations and leg swelling.   Gastrointestinal:  Negative for abdominal pain, diarrhea, nausea and vomiting.   Genitourinary:  Negative for dysuria, flank pain and frequency.   Musculoskeletal:  Negative for arthralgias, back pain, gait problem, joint swelling, myalgias, neck pain and neck stiffness.   Skin:  Positive for wound. Negative for rash.   Neurological:  Negative for dizziness, seizures, syncope, weakness, light-headedness, numbness and headaches.   All other systems reviewed and are negative.       Physical Exam  Vitals and nursing note reviewed.   Constitutional:       General: He is awake. He is not in acute distress.     Appearance: He is well-developed. He is not ill-appearing, toxic-appearing or diaphoretic.      Comments: Sitting up in the bed not in cervical collar.  Spontaneous near full range of motion of the cervical spine   HENT:      Head: Normocephalic. Abrasion present. No raccoon eyes, Wilson's

## (undated) DEVICE — GOWN,SIRUS,POLYRNF,BRTHSLV,XLN/XL,20/CS: Brand: MEDLINE

## (undated) DEVICE — UNIVERSAL DRAPES: Brand: MEDLINE INDUSTRIES, INC.

## (undated) DEVICE — SUT ETHLN 3-0 18IN PS2 BLK --

## (undated) DEVICE — BANDAGE COMPR W6INXL12FT SMOOTH FOR LIMB EXSANG ESMARCH

## (undated) DEVICE — SOLUTION IV IRRIG POUR BRL 0.9% SODIUM CHL 2F7124

## (undated) DEVICE — SPONGE,LAP,12"X12",XR,ST,5/PK,40PK/CS: Brand: MEDLINE

## (undated) DEVICE — ELECTRODE NDL 2.8IN COAT VALLEYLAB

## (undated) DEVICE — DERMATOME BLADES: Brand: DERMATOME

## (undated) DEVICE — DRAPE IRRIG FLD WRM W44XL44IN W/ AORN STD PRTBL INTRATEMP

## (undated) DEVICE — NDL FLTR TIP 5 MIC 18GX1.5IN --

## (undated) DEVICE — GAUZE,SPONGE,POST-OP,4X3,STRL,LF: Brand: MEDLINE

## (undated) DEVICE — APPLIER CLP L9.38IN M LIG TI DISP STR RNG HNDL LIGACLP

## (undated) DEVICE — READY WET SKIN SCRUB TRAY-LF: Brand: MEDLINE INDUSTRIES, INC.

## (undated) DEVICE — MICRODISSECTION NEEDLE STRAIGHT SLEEVE: Brand: COLORADO

## (undated) DEVICE — SUTURE ABSRB X-1 REV CUT 1/2 CIR 22MM UD BRAID 27IN SZ 3-0 J458H

## (undated) DEVICE — NDL SPNE QNCKE 18GX3.5IN LF --

## (undated) DEVICE — GEL ULTRASOUND 20 GM PACKET AQUASONIC BOX/100

## (undated) DEVICE — NDL CNTR 40CT FM MAG: Brand: MEDLINE INDUSTRIES, INC.

## (undated) DEVICE — SUTURE PERMAHAND SZ 2-0 L30IN NONABSORBABLE BLK SILK W/O A305H

## (undated) DEVICE — TOWEL,OR,DSP,ST,BLUE,STD,6/PK,12PK/CS: Brand: MEDLINE

## (undated) DEVICE — DRESSING,GAUZE,XEROFORM,CURAD,1"X8",ST: Brand: CURAD

## (undated) DEVICE — Device

## (undated) DEVICE — SYRINGE IRRIG 60ML SFT PLIABLE BLB EZ TO GRP 1 HND USE W/

## (undated) DEVICE — GLOVE SURG SZ 7 CRM LTX FREE POLYISOPRENE POLYMER BEAD ANTI

## (undated) DEVICE — SHEET DRAPE FULL 70X100

## (undated) DEVICE — SUTURE PERMAHAND SZ 3-0 L30IN NONABSORBABLE BLK SILK BRAID A304H

## (undated) DEVICE — MINOR SPLIT GENERAL: Brand: MEDLINE INDUSTRIES, INC.

## (undated) DEVICE — INTENDED FOR TISSUE SEPARATION, AND OTHER PROCEDURES THAT REQUIRE A SHARP SURGICAL BLADE TO PUNCTURE OR CUT.: Brand: BARD-PARKER ® STAINLESS STEEL BLADES

## (undated) DEVICE — 48" PROBE COVER W/GEL, ULTRASOUND, STERILE: Brand: SITE-RITE

## (undated) DEVICE — RESERVOIR,SUCTION,100CC,SILICONE: Brand: MEDLINE

## (undated) DEVICE — DRAIN SURG 3/4 W10MMXL20CM 100% SIL PERF FLAT HUBLESS

## (undated) DEVICE — BIT DRL L L266MM DIA16MM FEM FLX CANN QUIK CPL

## (undated) DEVICE — SUTURE PERMAHAND SZ 3-0 L18IN NONABSORBABLE BLK L26MM SH C013D

## (undated) DEVICE — BANDAGE,GAUZE,BULKEE II,4.5"X4.1YD,STRL: Brand: MEDLINE

## (undated) DEVICE — 3M™ TEGADERM™ TRANSPARENT FILM DRESSING FRAME STYLE, 1626W, 4 IN X 4-3/4 IN (10 CM X 12 CM), 50/CT 4CT/CASE: Brand: 3M™ TEGADERM™

## (undated) DEVICE — TOTAL TRAY, DB, 100% SILI FOLEY, 16FR 10: Brand: MEDLINE

## (undated) DEVICE — DRESSING,GAUZE,XEROFORM,CURAD,5"X9",ST: Brand: CURAD

## (undated) DEVICE — SPONGE LAP 18X18IN STRL -- 5/PK

## (undated) DEVICE — PACK,ORTHOPEDIC III,AURORA: Brand: MEDLINE

## (undated) DEVICE — BLOCK BITE 60FR RUBBER ADLT DENTAL

## (undated) DEVICE — BINDER ABD SM MED 30 IN - 45 IN HT 12 IN

## (undated) DEVICE — BIT DRL L145MM DIA4.2MM ST 3 FLUT NDL PNT QUIK CPL FOR FEM

## (undated) DEVICE — MEDI-VAC YANKAUER SUCTION HANDLE: Brand: CARDINAL HEALTH

## (undated) DEVICE — SUTURE ETHLN SZ 4-0 L18IN NONABSORBABLE BLK L19MM PS-2 3/8 1667H

## (undated) DEVICE — SUTURE VCRL SZ 2-0 L27IN ABSRB UD L26MM SH 1/2 CIR J417H

## (undated) DEVICE — NEEDLE HYPO 25GA L1.5IN BLU POLYPR HUB S STL REG BVL STR

## (undated) DEVICE — SUTURE PERMAHAND SZ 2-0 L18IN NONABSORBABLE BLK L26MM SH C012D

## (undated) DEVICE — MARKER,SKIN,WI/RULER AND LABELS: Brand: MEDLINE

## (undated) DEVICE — SUTURE MCRYL SZ 4-0 L27IN ABSRB UD L19MM PS-2 1/2 CIR PRIM Y426H

## (undated) DEVICE — SUTURE PERMAHAND SZ 2-0 L18IN NONABSORBABLE BLK L26MM PS 1588H

## (undated) DEVICE — ROD RMR L950MM DIA2.5MM W/ EXTN BALL TIP

## (undated) DEVICE — BETADINE 5% EYE SOL

## (undated) DEVICE — GUIDEWIRE ORTH L400MM DIA3.2MM FOR TFN

## (undated) DEVICE — BANDAGE COMPR M W6INXL10YD WHT BGE VELC E MTRX HK AND LOOP

## (undated) DEVICE — GOWN,SIRUS,FABRNF,XL,20/CS: Brand: MEDLINE

## (undated) DEVICE — DRAPE SHT 3 QTR PROXIMA 53X77 --

## (undated) DEVICE — ELECTRODE PT RET AD L9FT HI MOIST COND ADH HYDRGEL CORDED

## (undated) DEVICE — NEEDLE ELECTRODE: Brand: VALLEYLAB

## (undated) DEVICE — Z DISCONTINUED USE 2275686 GLOVE SURG SZ 8 L12IN FNGR THK13MIL WHT ISOLEX POLYISOPRENE

## (undated) DEVICE — SUT SLK 2-0SH 30IN BLK --

## (undated) DEVICE — PADDING,UNDERCAST,COTTON, 4"X4YD STERILE: Brand: MEDLINE

## (undated) DEVICE — REM POLYHESIVE ADULT PATIENT RETURN ELECTRODE: Brand: VALLEYLAB

## (undated) DEVICE — MARKER SKIN GENTIAN VLT FN TIP W/ 6IN RUL L RESVR MED GRD

## (undated) DEVICE — CARDINAL HEALTH FLEXIBLE LIGHT HANDLE COVER: Brand: CARDINAL HEALTH

## (undated) DEVICE — BANDAGE,SELF ADHRNT,COFLEX,4"X5YD,STRL: Brand: COLABEL

## (undated) DEVICE — DRAPE 64X41IN RADIOLOGY C ARM EQUIP STER

## (undated) DEVICE — BUTTON SWITCH PENCIL BLADE ELECTRODE, HOLSTER: Brand: EDGE

## (undated) DEVICE — BIT DRL L500MM DIA6X9MM CANN STP L QUIK CPL FOR DH DC TFN

## (undated) DEVICE — NEEDLE SPNL 22GA L3.5IN BLK HUB S STL REG WALL FIT STYL W/

## (undated) DEVICE — SPONGE GZ W4XL4IN COT 12 PLY TYP VII WVN C FLD DSGN

## (undated) DEVICE — Z DISCONTINUED PER MEDLINE USE 2741942 DRESSING AQUACEL 6 IN ALG W9XL15CM SIL CVR WTRPRF VIR BACT BARR ANTIMIC

## (undated) DEVICE — TRAY PREP DRY W/ PREM GLV 2 APPL 6 SPNG 2 UNDPD 1 OVERWRAP

## (undated) DEVICE — Z DISCONTINUED NO SUB IDED GLOVE SURG BEAD CUF 8 STD PF WHT STRL TRIUMPH LT LTX

## (undated) DEVICE — SHEET SUPPORT

## (undated) DEVICE — GARMENT,MEDLINE,DVT,INT,CALF,LG, GEN2: Brand: MEDLINE

## (undated) DEVICE — BANDAGE COMPR W6INXL5YD SELF ADH COHESIVE CO FLX

## (undated) DEVICE — STAPLER SKIN SQ 30 ABSRB STPL DISP INSORB

## (undated) DEVICE — SUTURE PROL SZ 6-0 L18IN NONABSORBABLE BLU P-3 L13MM 3/8 8695G

## (undated) DEVICE — BANDAGE COBAN 4 IN COMPR W4INXL5YD FOAM COHESIVE QUIK STK SELF ADH SFT

## (undated) DEVICE — HANDPIECE SET WITH COAXIAL HIGH FLOW TIP AND SUCTION TUBE: Brand: INTERPULSE

## (undated) DEVICE — CROUCH CORNEAL PROTECTOR: Brand: BAUSCH + LOMB

## (undated) DEVICE — DOUBLE BASIN SET: Brand: MEDLINE INDUSTRIES, INC.

## (undated) DEVICE — SYR 5ML 1/5 GRAD LL NSAF LF --

## (undated) DEVICE — GAUZE,SPONGE,4"X4",16PLY,STRL,LF,10/TRAY: Brand: MEDLINE

## (undated) DEVICE — DEFENDO AIR WATER SUCTION AND BIOPSY VALVE KIT FOR  OLYMPUS: Brand: DEFENDO AIR/WATER/SUCTION AND BIOPSY VALVE

## (undated) DEVICE — SUTURE PERMAHAND SZ 2-0 L12X18IN NONABSORBABLE BLK SILK A185H

## (undated) DEVICE — PENCIL ES L3M BTTN SWCH HOLSTER W/ BLDE ELECTRD EDGE

## (undated) DEVICE — DRAPE, SLUSH XL, 44X66, STERILE: Brand: MEDLINE

## (undated) DEVICE — MARKER UTIL W/RULER AND LBL -- STRL

## (undated) DEVICE — COVER,LIGHT HANDLE,FLX,1/PK: Brand: MEDLINE INDUSTRIES, INC.

## (undated) DEVICE — SUTURE MCRYL SZ 3-0 L27IN ABSRB UD L19MM PS-2 3/8 CIR PRIM Y427H

## (undated) DEVICE — SOLUTION IV 1000ML 0.9% SOD CHL

## (undated) DEVICE — SOL IRR SOD CL 0.9% 3000ML --

## (undated) DEVICE — DRAPE,TOP,102X53,STERILE: Brand: MEDLINE

## (undated) DEVICE — NEPTUNE E-SEP SMOKE EVACUATION PENCIL, COATED, 70MM BLADE, PUSH BUTTON SWITCH: Brand: NEPTUNE E-SEP

## (undated) DEVICE — CANNULA NSL ORAL AD FOR CAPNOFLEX CO2 O2 AIRLFE

## (undated) DEVICE — TUBING, SUCTION, 1/4" X 10', STRAIGHT: Brand: MEDLINE

## (undated) DEVICE — SUTURE VCRL SZ 0 L27IN ABSRB UD L36MM CP-1 1/2 CIR REV CUT J267H

## (undated) DEVICE — SET MAJOR INSTR ORTHO